# Patient Record
Sex: FEMALE | Race: WHITE | NOT HISPANIC OR LATINO | Employment: FULL TIME | URBAN - METROPOLITAN AREA
[De-identification: names, ages, dates, MRNs, and addresses within clinical notes are randomized per-mention and may not be internally consistent; named-entity substitution may affect disease eponyms.]

---

## 2017-07-10 ENCOUNTER — TELEPHONE (OUTPATIENT)
Dept: INTERNAL MEDICINE | Facility: CLINIC | Age: 37
End: 2017-07-10

## 2017-07-10 RX ORDER — LEVOTHYROXINE SODIUM 50 UG/1
50 TABLET ORAL DAILY
Refills: 0 | COMMUNITY
Start: 2017-07-08 | End: 2017-10-26 | Stop reason: SDUPTHER

## 2017-07-10 RX ORDER — BUPROPION HYDROCHLORIDE 300 MG/1
300 TABLET ORAL DAILY
Refills: 0 | COMMUNITY
Start: 2017-07-05 | End: 2017-10-26 | Stop reason: SDUPTHER

## 2017-07-10 RX ORDER — HYDROCHLOROTHIAZIDE 12.5 MG/1
12.5 CAPSULE ORAL DAILY
COMMUNITY
End: 2017-10-26 | Stop reason: ALTCHOICE

## 2017-07-10 RX ORDER — FOLIC ACID 0.4 MG
400 TABLET ORAL DAILY
COMMUNITY
End: 2017-07-11

## 2017-07-11 ENCOUNTER — OFFICE VISIT (OUTPATIENT)
Dept: INTERNAL MEDICINE | Facility: CLINIC | Age: 37
End: 2017-07-11
Payer: COMMERCIAL

## 2017-07-11 VITALS
RESPIRATION RATE: 16 BRPM | OXYGEN SATURATION: 97 % | HEIGHT: 63 IN | HEART RATE: 73 BPM | TEMPERATURE: 99 F | SYSTOLIC BLOOD PRESSURE: 122 MMHG | WEIGHT: 186.75 LBS | DIASTOLIC BLOOD PRESSURE: 82 MMHG | BODY MASS INDEX: 33.09 KG/M2

## 2017-07-11 DIAGNOSIS — H70.002 ACUTE MASTOIDITIS OF LEFT SIDE WITHOUT COMPLICATIONS: Primary | ICD-10-CM

## 2017-07-11 PROCEDURE — 99203 OFFICE O/P NEW LOW 30 MIN: CPT | Mod: S$GLB,,, | Performed by: FAMILY MEDICINE

## 2017-07-11 PROCEDURE — 3008F BODY MASS INDEX DOCD: CPT | Mod: S$GLB,,, | Performed by: FAMILY MEDICINE

## 2017-07-11 PROCEDURE — 99999 PR PBB SHADOW E&M-EST. PATIENT-LVL IV: CPT | Mod: PBBFAC,,, | Performed by: FAMILY MEDICINE

## 2017-07-11 RX ORDER — AMOXICILLIN 500 MG/1
500 TABLET, FILM COATED ORAL EVERY 12 HOURS
Qty: 20 TABLET | Refills: 0 | Status: SHIPPED | OUTPATIENT
Start: 2017-07-11 | End: 2017-07-21

## 2017-07-11 RX ORDER — AMOXICILLIN AND CLAVULANATE POTASSIUM 875; 125 MG/1; MG/1
1 TABLET, FILM COATED ORAL 2 TIMES DAILY
COMMUNITY
End: 2017-08-31

## 2017-07-18 ENCOUNTER — OFFICE VISIT (OUTPATIENT)
Dept: OTOLARYNGOLOGY | Facility: CLINIC | Age: 37
End: 2017-07-18
Payer: COMMERCIAL

## 2017-07-18 ENCOUNTER — TELEPHONE (OUTPATIENT)
Dept: INTERNAL MEDICINE | Facility: CLINIC | Age: 37
End: 2017-07-18

## 2017-07-18 VITALS
TEMPERATURE: 98 F | SYSTOLIC BLOOD PRESSURE: 116 MMHG | HEART RATE: 89 BPM | DIASTOLIC BLOOD PRESSURE: 82 MMHG | BODY MASS INDEX: 32.61 KG/M2 | WEIGHT: 184.06 LBS

## 2017-07-18 DIAGNOSIS — H81.02 MENIERE DISEASE, LEFT: ICD-10-CM

## 2017-07-18 DIAGNOSIS — H92.02 OTALGIA, LEFT: Primary | ICD-10-CM

## 2017-07-18 PROCEDURE — 99243 OFF/OP CNSLTJ NEW/EST LOW 30: CPT | Mod: S$GLB,,, | Performed by: PHYSICIAN ASSISTANT

## 2017-07-18 PROCEDURE — 99999 PR PBB SHADOW E&M-EST. PATIENT-LVL III: CPT | Mod: PBBFAC,,, | Performed by: PHYSICIAN ASSISTANT

## 2017-07-18 NOTE — PROGRESS NOTES
Subjective:       Patient ID: Paty Parham is a 37 y.o. female.    Chief Complaint: Mastoiditis  (per Dr. Jorge Alberto Hunter )    Patient is a very pleasant 37 year old female here to see me today for the first time in consultation at the request of Dr. Hunter for evaluation of left mastoiditis.  Patient reports tenderness behind her left ear starting 7/7/17.  She is unsure if there was swelling.  She was seen at  on 7/10 and told eardrum looked cloudy and she was started on Augmentin.  She saw PCP the following day and was told she had mastoiditis and Amoxil was added to take along with Augmentin.  She's been on both antibiotics since that time and says she no longer has pain behind her left ear but does have discomfort below her left ear and in front of her left ear.  She thought her face in front of her left ear looked a little swollen earlier this week but denies redness.   Denies fever but had chills when she first went to .  Denies ear pain or drainage.  She has pressure in her left ear and tinnitus occasionally but that's not uncommon for her as she has AS Meniere's and takes HCTZ daily.  She was diagnosed in 2012 by Dr. Duncan Cedillo.  She has history of nontraumatic right TM perforation last October.  She denies grinding or clenching her teeth.  She has had right parotid sialoadenitis in the past due to stone obstruction but says her left face was not that swollen recently and not nearly as tender.  Denies pain or drainage in her mouth.  Denies otologic surgery.  Family history of hearing loss in her father (noise).  Denies history of loud noise exposure.  Denies dizziness but feels run down and disoriented at times.      Review of Systems   Constitutional: Positive for chills and fatigue. Negative for fever.   HENT: Positive for dental problem (left jaw), facial swelling (she thought left preauricular area swollen earlier this week) and tinnitus. Negative for congestion, ear discharge, ear pain  (fullness in left ear, no pain, just pressure), hearing loss, nosebleeds, postnasal drip, rhinorrhea, sinus pressure, sneezing, sore throat, trouble swallowing and voice change.    Eyes: Negative for discharge.   Respiratory: Negative for cough, shortness of breath and wheezing.    Cardiovascular: Negative for chest pain and palpitations.   Gastrointestinal: Positive for nausea. Negative for diarrhea and vomiting.   Musculoskeletal: Positive for arthralgias and back pain (injections in June). Negative for neck pain.   Allergic/Immunologic: Positive for environmental allergies. Negative for food allergies.   Neurological: Positive for dizziness (disoriented; not spinning) and headaches. Negative for seizures, speech difficulty, weakness and light-headedness.   Hematological: Negative for adenopathy.   Psychiatric/Behavioral: Positive for sleep disturbance (with antibiotics; taking Benadryl at night).       Objective:      Physical Exam   Constitutional: She is oriented to person, place, and time. She appears well-developed and well-nourished. She is cooperative. No distress.   HENT:   Head: Normocephalic and atraumatic.   Right Ear: Tympanic membrane, external ear and ear canal normal. No drainage, swelling or tenderness. Tympanic membrane is not erythematous. No middle ear effusion.   Left Ear: Tympanic membrane, external ear and ear canal normal. No drainage, swelling or tenderness. Tympanic membrane is not erythematous.  No middle ear effusion.   Nose: Nose normal. No mucosal edema, rhinorrhea, nasal deformity or septal deviation. No epistaxis. Right sinus exhibits no maxillary sinus tenderness and no frontal sinus tenderness. Left sinus exhibits no maxillary sinus tenderness and no frontal sinus tenderness.   Mouth/Throat: Uvula is midline, oropharynx is clear and moist and mucous membranes are normal. Mucous membranes are not pale and not dry. No trismus in the jaw. Normal dentition. No uvula swelling. No  oropharyngeal exudate or posterior oropharyngeal erythema.   No erythema or edema behind left ear or over the mastoid.  Not tender with palpation.  No displacement of the external ear.  No obvious edema or erythema of the left face or overlying the parotid.   Eyes: Conjunctivae, EOM and lids are normal. Pupils are equal, round, and reactive to light. Right eye exhibits no chemosis. Left eye exhibits no chemosis. Right conjunctiva is not injected. Left conjunctiva is not injected. No scleral icterus. Right eye exhibits normal extraocular motion and no nystagmus. Left eye exhibits normal extraocular motion and no nystagmus.   Neck: Trachea normal and phonation normal. No tracheal tenderness present. No tracheal deviation present. No thyroid mass and no thyromegaly present.   Cardiovascular: Intact distal pulses.    Pulmonary/Chest: Effort normal. No stridor. No respiratory distress.   Abdominal: She exhibits no distension.   Lymphadenopathy:        Head (right side): No submental, no submandibular, no preauricular and no posterior auricular adenopathy present.        Head (left side): No submental, no submandibular, no preauricular and no posterior auricular adenopathy present.     She has no cervical adenopathy.   Neurological: She is alert and oriented to person, place, and time. No cranial nerve deficit.   Skin: Skin is warm and dry. No rash noted. No erythema.   Psychiatric: She has a normal mood and affect. Her behavior is normal.           Tuning fork:  AC > BC AU  Husain lateralizes to the RIGHT    Assessment:       1. Otalgia, left    2. Meniere disease, left        Plan:         Reassured patient that her exam today looks good.  I see no FEDE and no obvious signs of mastoiditis.  She does not have erythema or edema over the mastoid or behind her ear.  That area is not tender to palpation.  Recommend she complete the oral antibiotics as prescribed.  No need for imaging today as she's now feeling better.  She  asks if the left facial swelling she experienced earlier this week could be related to her parotid gland and a possible stone.  Her exam today is not consistent with sialoadenitis but she's now been treated with Augmentin for over one week so that could have helped.  She admits to having nausea and maybe a little dehydration this past week.  We discussed conservative measures to help treat sialoadenitis, including massage, warm compresses, aggressive hydration, and oral sialogogues (lemon drops or peppermints).  Instructed her to call if she has any worsening pain or swelling and we can image at that time.      Thanks for the referral Dr. Hunter.  Report returned via EPIC.

## 2017-07-18 NOTE — TELEPHONE ENCOUNTER
----- Message from Marianela Moura sent at 7/18/2017  9:51 AM CDT -----  Contact: pt   Call pt regarding getting fitted in today to see the doctor to get rechecked for mastoiditis.  [phone] (home)

## 2017-08-15 NOTE — ASSESSMENT & PLAN NOTE
QUALITY described as an aching discomfort. LOCATION is inferior and posterior aspect of left ear. ONSET reported as over the last few days area and SEVERITY described as MODERATE. EXACERBATING factors include direct pressure.

## 2017-08-15 NOTE — PROGRESS NOTES
"CHIEF COMPLAINT  Otalgia      HISTORY OF PRESENT ILLNESS     Problem List Items Addressed This Visit     Acute mastoiditis of left side without complications - Primary    Current Assessment & Plan     QUALITY described as an aching discomfort. LOCATION is inferior and posterior aspect of left ear. ONSET reported as over the last few days area and SEVERITY described as MODERATE. EXACERBATING factors include direct pressure.         Relevant Medications    amoxicillin-clavulanate 875-125mg (AUGMENTIN) 875-125 mg per tablet      Other Visit Diagnoses    None.         REVIEW OF SYSTEMS  CONSTITUTIONAL: No fever or chills reported.   ENT: No difficulty swallowing reported.   PULMONARY: No cough or trouble breathing reported.   CARDIOVASCULAR: No angina or orthopnea reported.     PHYSICAL EXAM  Vitals:    07/11/17 1508   BP: 122/82   BP Location: Right arm   Patient Position: Sitting   Pulse: 73   Resp: 16   Temp: 98.6 °F (37 °C)   TempSrc: Oral   SpO2: 97%   Weight: 84.7 kg (186 lb 11.7 oz)   Height: 5' 3" (1.6 m)     CONST: Vital signs (BP, P, T, RR, et al) noted. No apparent distress. Does not appear acutely ill or septic. Appears adequately hydrated.  EYES: Pupils equal and reactive. Extraocular movements intact. Sclerae anicteric. Lids and conjunctiva unremarkable.  ENT: External ENT grossly unremarkable to inspection. Ear canals clear. Tympanic membranes are unremarkable, intact and not inflamed or bulging. Hearing grossly intact. Moderate tenderness on palpation of left mastoid process. Nasal mucosa pink.  Oropharynx moist without lesion, inflammation or exudate. Posterior oropharynx is symmetric.  NECK: Trachea midline. No significant cervical lymphadenopathy.  PULM: Lungs clear. Breathing unlabored.  HEART: Auscultation reveals regular rate and rhythm without murmur, gallop or rub. No carotid bruit.  GI: Abdomen soft and nontender. Bowel sounds present.  DERM: Skin warm and moist with normal " turgor.  NEURO: Strength is reasonably symmetric without gross focal motor deficits or gross deficits of cranial nerves III-XII.  PSYCH: Alert and oriented x 3. Mood is grossly euthymic. Affect appropriate. Judgment and insight not grossly compromised.  MSK: Grossly normal stance and gait.     PAST MEDICAL HISTORY, FAMILY HISTORY, SOCIAL HISTORY, CURRENT MEDICATION LIST, and ALLERGY LIST reviewed by me (JOSSELIN Hunter MD) and are updated consistent with the patient's report.    ASSESSMENT and PLAN  Acute mastoiditis of left side without complications  -     amoxicillin (AMOXIL) 500 MG Tab; Take 1 tablet (500 mg total) by mouth every 12 (twelve) hours. (TAKE CONCURRENTLY WITH AUGMENTIN)  Dispense: 20 tablet; Refill: 0        Medication List with Changes/Refills   Current Medications    AMOXICILLIN-CLAVULANATE 875-125MG (AUGMENTIN) 875-125 MG PER TABLET    Take 1 tablet by mouth 2 (two) times daily.    BUPROPION (WELLBUTRIN XL) 300 MG 24 HR TABLET    Take 300 mg by mouth once daily.    HYDROCHLOROTHIAZIDE (MICROZIDE) 12.5 MG CAPSULE    Take 12.5 mg by mouth once daily.    SYNTHROID 50 MCG TABLET    Take 50 mcg by mouth once daily.    ZENCHENT FE 0.4MG-35MCG(21) AND 75 MG (7) CHEW    Take 1 tablet by mouth once daily.   Discontinued Medications    FOLIC ACID (FOLVITE) 400 MCG TABLET    Take 400 mcg by mouth once daily.       Return for re-evaluate problem(s) discussed today.    ABOUT THIS DOCUMENTATION:  1. The order of the conditions listed in the HPI is one of convenience and does not necessarily reflect the chronology of the appointment, nor the relative importance of a condition. It is possible that additional description or status details about condition(s) may be found elsewhere in the documentation for today's encounter.  2. Documentation entered by me for this encounter was done in part using speech-recognition technology. Although I have made an effort to ensure accuracy, malapropisms may exist and  should be interpreted in context.                        -JOSSELIN Hunter MD    Patient Instructions

## 2017-08-31 ENCOUNTER — OFFICE VISIT (OUTPATIENT)
Dept: INTERNAL MEDICINE | Facility: CLINIC | Age: 37
End: 2017-08-31
Payer: COMMERCIAL

## 2017-08-31 ENCOUNTER — LAB VISIT (OUTPATIENT)
Dept: LAB | Facility: HOSPITAL | Age: 37
End: 2017-08-31
Attending: FAMILY MEDICINE
Payer: COMMERCIAL

## 2017-08-31 VITALS
TEMPERATURE: 96 F | OXYGEN SATURATION: 98 % | DIASTOLIC BLOOD PRESSURE: 72 MMHG | SYSTOLIC BLOOD PRESSURE: 122 MMHG | BODY MASS INDEX: 33.4 KG/M2 | WEIGHT: 188.5 LBS | HEIGHT: 63 IN | HEART RATE: 85 BPM

## 2017-08-31 DIAGNOSIS — F41.9 ANXIETY: ICD-10-CM

## 2017-08-31 DIAGNOSIS — E03.8 HYPOTHYROIDISM DUE TO HASHIMOTO'S THYROIDITIS: Primary | ICD-10-CM

## 2017-08-31 DIAGNOSIS — E06.3 HYPOTHYROIDISM DUE TO HASHIMOTO'S THYROIDITIS: Primary | ICD-10-CM

## 2017-08-31 DIAGNOSIS — E06.3 HYPOTHYROIDISM DUE TO HASHIMOTO'S THYROIDITIS: ICD-10-CM

## 2017-08-31 DIAGNOSIS — F33.41 RECURRENT MAJOR DEPRESSION IN PARTIAL REMISSION: Chronic | ICD-10-CM

## 2017-08-31 DIAGNOSIS — E03.8 HYPOTHYROIDISM DUE TO HASHIMOTO'S THYROIDITIS: ICD-10-CM

## 2017-08-31 DIAGNOSIS — I10 BENIGN ESSENTIAL HYPERTENSION: ICD-10-CM

## 2017-08-31 PROBLEM — H70.002: Status: RESOLVED | Noted: 2017-07-11 | Resolved: 2017-08-31

## 2017-08-31 LAB — TSH SERPL DL<=0.005 MIU/L-ACNC: 1.71 UIU/ML

## 2017-08-31 PROCEDURE — 99214 OFFICE O/P EST MOD 30 MIN: CPT | Mod: S$GLB,,, | Performed by: FAMILY MEDICINE

## 2017-08-31 PROCEDURE — 3008F BODY MASS INDEX DOCD: CPT | Mod: S$GLB,,, | Performed by: FAMILY MEDICINE

## 2017-08-31 PROCEDURE — 99999 PR PBB SHADOW E&M-EST. PATIENT-LVL IV: CPT | Mod: PBBFAC,,, | Performed by: FAMILY MEDICINE

## 2017-08-31 PROCEDURE — 84443 ASSAY THYROID STIM HORMONE: CPT

## 2017-08-31 PROCEDURE — 36415 COLL VENOUS BLD VENIPUNCTURE: CPT | Mod: PO

## 2017-08-31 RX ORDER — DIAZEPAM 2 MG/1
2 TABLET ORAL EVERY 12 HOURS PRN
Qty: 45 TABLET | Refills: 1 | Status: SHIPPED | OUTPATIENT
Start: 2017-08-31 | End: 2017-10-26 | Stop reason: SDUPTHER

## 2017-08-31 NOTE — PROGRESS NOTES
"CHIEF COMPLAINT  Anxiety      HISTORY OF PRESENT ILLNESS     Problem List Items Addressed This Visit     Benign essential hypertension    Current Assessment & Plan     Based on the report of the patient and information available to me at present, this condition appears to be WELL CONTROLLED, and this condition appears to be STABLE.          Hypothyroidism due to Hashimoto's thyroiditis - Primary (Chronic)    Current Assessment & Plan     She is overdue for TSH monitoring. We discussed how supratherapeutic dosing of levothyroxine cause or exacerbate her anxiety symptoms.         Relevant Orders    TSH (Completed)    Recurrent major depression in partial remission (Chronic)    Current Assessment & Plan     Her depression appears reasonably well controlled, with only minimal to mild breakthrough depression symptoms. She is tolerating bupropion well without apparent side effect.         Anxiety (Chronic)    Current Assessment & Plan     She reports increasing anxiety, pervasive worrying and psychomotor agitation, and disturbed sleep. We discussed risks and benefits of treatment options, and it was agreed that she would begin CBT and use diazepam as needed for rate your anxiety symptoms. She understands that this medication can be sedating and potentially habit forming. She has no history of chemical dependency.         Relevant Medications    diazePAM (VALIUM) 2 MG tablet    Other Relevant Orders    Ambulatory referral to Psychology      Other Visit Diagnoses    None.         REVIEW OF SYSTEMS  PSYCHIATRIC: No suicidal ideations, shante or hypomania reported.  NEUROLOGIC: No seizures or disordered movement reported.  ENDOCRINE: No polyuria or polydipsia reported.     PHYSICAL EXAM  Vitals:    08/31/17 0822   BP: 122/72   BP Location: Right arm   Patient Position: Sitting   BP Method: Medium (Manual)   Pulse: 85   Temp: 96.1 °F (35.6 °C)   TempSrc: Tympanic   SpO2: 98%   Weight: 85.5 kg (188 lb 7.9 oz)   Height: 5' 3" " (1.6 m)     CONSTITUTIONAL: Vital signs (BP, P, T, RR, et al) noted. No apparent distress. Does not appear acutely ill or septic. Appears adequately hydrated.  HEENT: External ENT grossly unremarkable. Hearing grossly intact. Oropharynx moist.  NECK: Trachea midline. Thyroid nontender.  PULM: Lungs clear. Breathing unlabored.  HEART: Auscultation reveals regular rate and rhythm without murmur, gallop or rub.  DERM: Skin warm and moist with normal turgor.  NEURO: There are no gross focal motor deficits or gross deficits of cranial nerves III-XII.  PSYCHIATRIC: Alert and oriented x 3. Mood is grossly neutral. Affect appropriate. Judgment and insight not grossly compromised.  MUSCULOSKELETAL: Grossly normal stance and gait.     PAST MEDICAL HISTORY, FAMILY HISTORY, SOCIAL HISTORY, CURRENT MEDICATION LIST, and ALLERGY LIST reviewed by me (JOSSELIN Hunter MD) and are updated consistent with the patient's report.    ASSESSMENT and PLAN  Hypothyroidism due to Hashimoto's thyroiditis  -     TSH; Future; Expected date: 08/31/2017    Recurrent major depression in partial remission    Anxiety  -     Ambulatory referral to Psychology  -     diazePAM (VALIUM) 2 MG tablet; Take 1 tablet (2 mg total) by mouth every 12 (twelve) hours as needed for Anxiety.  Dispense: 45 tablet; Refill: 1    Benign essential hypertension        Medication List with Changes/Refills   New Medications    DIAZEPAM (VALIUM) 2 MG TABLET    Take 1 tablet (2 mg total) by mouth every 12 (twelve) hours as needed for Anxiety.   Current Medications    BUPROPION (WELLBUTRIN XL) 300 MG 24 HR TABLET    Take 300 mg by mouth once daily.    HYDROCHLOROTHIAZIDE (MICROZIDE) 12.5 MG CAPSULE    Take 12.5 mg by mouth once daily.    SYNTHROID 50 MCG TABLET    Take 50 mcg by mouth once daily.    ZENCHENT FE 0.4MG-35MCG(21) AND 75 MG (7) CHEW    Take 1 tablet by mouth once daily.   Discontinued Medications    AMOXICILLIN-CLAVULANATE 875-125MG (AUGMENTIN) 875-125  MG PER TABLET    Take 1 tablet by mouth 2 (two) times daily.       Return in about 6 weeks (around 10/12/2017) for re-evaluate problem(s) discussed today.    ABOUT THIS DOCUMENTATION:  · The order of the conditions listed in the HPI is one of convenience and does not necessarily reflect the chronology of the appointment, nor the relative importance of a condition. It is possible that additional description or status details about condition(s) may be found elsewhere in the documentation for today's encounter.  · Documentation entered by me for this encounter was done in part using speech-recognition technology. Although I have made an effort to ensure accuracy, malapropisms may exist and should be interpreted in context.                        -JOSSELIN Hunter MD    Patient Instructions   CALL TO SCHEDULE APPOINTMENT:  Lisa Gallego LCSW  [address] [facility] [place] [address]  PHONE & FAX: [phone]

## 2017-08-31 NOTE — PATIENT INSTRUCTIONS
CALL TO SCHEDULE APPOINTMENT:  Lisa Gallego LCSW  [address] [facility]  [place] [address]  PHONE & FAX: [phone]

## 2017-09-01 PROBLEM — I10 BENIGN ESSENTIAL HYPERTENSION: Status: ACTIVE | Noted: 2017-09-01

## 2017-09-01 NOTE — ASSESSMENT & PLAN NOTE
She reports increasing anxiety, pervasive worrying and psychomotor agitation, and disturbed sleep. We discussed risks and benefits of treatment options, and it was agreed that she would begin CBT and use diazepam as needed for rate your anxiety symptoms. She understands that this medication can be sedating and potentially habit forming. She has no history of chemical dependency.

## 2017-09-01 NOTE — ASSESSMENT & PLAN NOTE
Her depression appears reasonably well controlled, with only minimal to mild breakthrough depression symptoms. She is tolerating bupropion well without apparent side effect.

## 2017-09-01 NOTE — ASSESSMENT & PLAN NOTE
She is overdue for TSH monitoring. We discussed how supratherapeutic dosing of levothyroxine cause or exacerbate her anxiety symptoms.

## 2017-09-04 NOTE — PROGRESS NOTES
SEE PATIENT PORTAL COMMENT  (This is FYI only. No further action required, unless you need to notify the patient or otherwise deem it necessary.)  --------------------------------------------------------------------------------   Paty's future appointment include:  No future appointments.

## 2017-10-26 ENCOUNTER — OFFICE VISIT (OUTPATIENT)
Dept: INTERNAL MEDICINE | Facility: CLINIC | Age: 37
End: 2017-10-26
Payer: COMMERCIAL

## 2017-10-26 ENCOUNTER — CLINICAL SUPPORT (OUTPATIENT)
Dept: CARDIOLOGY | Facility: CLINIC | Age: 37
End: 2017-10-26
Payer: COMMERCIAL

## 2017-10-26 VITALS
OXYGEN SATURATION: 98 % | DIASTOLIC BLOOD PRESSURE: 82 MMHG | WEIGHT: 191.13 LBS | HEART RATE: 78 BPM | HEIGHT: 63 IN | BODY MASS INDEX: 33.87 KG/M2 | SYSTOLIC BLOOD PRESSURE: 116 MMHG | TEMPERATURE: 98 F

## 2017-10-26 DIAGNOSIS — Q82.0 HEREDITARY EDEMA OF LEGS: Chronic | ICD-10-CM

## 2017-10-26 DIAGNOSIS — F33.41 RECURRENT MAJOR DEPRESSION IN PARTIAL REMISSION: Chronic | ICD-10-CM

## 2017-10-26 DIAGNOSIS — Z79.899 ENCOUNTER FOR LONG-TERM CURRENT USE OF MEDICATION: Chronic | ICD-10-CM

## 2017-10-26 DIAGNOSIS — I10 BENIGN ESSENTIAL HYPERTENSION: ICD-10-CM

## 2017-10-26 DIAGNOSIS — E06.3 HYPOTHYROIDISM DUE TO HASHIMOTO'S THYROIDITIS: Chronic | ICD-10-CM

## 2017-10-26 DIAGNOSIS — F41.1 GENERALIZED ANXIETY DISORDER: Chronic | ICD-10-CM

## 2017-10-26 DIAGNOSIS — E03.8 HYPOTHYROIDISM DUE TO HASHIMOTO'S THYROIDITIS: Chronic | ICD-10-CM

## 2017-10-26 DIAGNOSIS — F41.9 ANXIETY: ICD-10-CM

## 2017-10-26 DIAGNOSIS — I10 BENIGN ESSENTIAL HYPERTENSION: Primary | ICD-10-CM

## 2017-10-26 DIAGNOSIS — R07.89 CHEST PAIN, ATYPICAL: Chronic | ICD-10-CM

## 2017-10-26 PROCEDURE — 99214 OFFICE O/P EST MOD 30 MIN: CPT | Mod: S$GLB,,, | Performed by: FAMILY MEDICINE

## 2017-10-26 PROCEDURE — 99999 PR PBB SHADOW E&M-EST. PATIENT-LVL III: CPT | Mod: PBBFAC,,, | Performed by: FAMILY MEDICINE

## 2017-10-26 PROCEDURE — 93000 ELECTROCARDIOGRAM COMPLETE: CPT | Mod: S$GLB,,, | Performed by: INTERNAL MEDICINE

## 2017-10-26 RX ORDER — BUPROPION HYDROCHLORIDE 300 MG/1
300 TABLET ORAL DAILY
Qty: 90 TABLET | Refills: 3 | Status: SHIPPED | OUTPATIENT
Start: 2017-10-26 | End: 2018-11-29 | Stop reason: SDUPTHER

## 2017-10-26 RX ORDER — LEVOTHYROXINE SODIUM 50 UG/1
50 TABLET ORAL DAILY
Qty: 90 TABLET | Refills: 3 | Status: SHIPPED | OUTPATIENT
Start: 2017-10-26 | End: 2018-11-29 | Stop reason: SDUPTHER

## 2017-10-26 RX ORDER — NITROGLYCERIN 0.4 MG/1
TABLET SUBLINGUAL
Qty: 25 TABLET | Refills: 2 | Status: SHIPPED | OUTPATIENT
Start: 2017-10-26 | End: 2018-05-23

## 2017-10-26 RX ORDER — DIAZEPAM 2 MG/1
2 TABLET ORAL EVERY 12 HOURS PRN
Qty: 60 TABLET | Refills: 4 | Status: SHIPPED | OUTPATIENT
Start: 2017-10-26 | End: 2018-05-23 | Stop reason: SDUPTHER

## 2017-10-26 RX ORDER — AMLODIPINE BESYLATE 5 MG/1
5 TABLET ORAL DAILY
Qty: 30 TABLET | Refills: 1 | Status: SHIPPED | OUTPATIENT
Start: 2017-10-26 | End: 2017-11-20 | Stop reason: SINTOL

## 2017-10-29 NOTE — PROGRESS NOTES
HEALTH MAINTENANCE REVIEW  Health Maintenance   Topic Date Due    Pap Smear with HPV Cotest  07/03/2001    Influenza Vaccine  08/01/2017    TETANUS VACCINE  10/12/2025    Lipid Panel  Completed        HEALTH MAINTENANCE INTERVENTIONS - DUE OR DUE SOON  Health Maintenance Due   Topic Date Due    Pap Smear with HPV Cotest  07/03/2001    Influenza Vaccine  08/01/2017       FUTURE APPOINTMENTS  No future appointments.    CHIEF COMPLAINT  Anxiety (meds helping, also counceling)      HISTORY OF PRESENT ILLNESS  PROBLEM/CONDITION: Hypertension appears well-controlled. No exertional chest pain or shortness of breath reported.    PROBLEM/CONDITION: Hypothyroidism appears well-controlled.    PROBLEM/CONDITION: Depression appears well-controlled. No shante, hypomania, or suicidal ideations reported.    PROBLEM/CONDITION: Anxiety appears equivocally controlled. No paranoia or disordered thinking reported. She has been seeing licensed clinical  on a biweekly basis for cognitive behavioral therapy, and she says that this has been very therapeutic. She says that she is needing/using the diazepam an average of 3 mg (1 1/2 tablet of the 2 mg tablet) daily, and it is providing significant symptomatic relief without causing excess sedation. She is demonstrating no behaviors to suggest inappropriate use of prescribed medications. [place] Board of Pharmacy Controlled Prescription Drug Monitoring database was queried and showed no activity to suggest abuse, diversion, or other inappropriate use of prescription medications.    PROBLEM/CONDITION: Symptoms previously described in a manner that suggested panic attacks were further explored and it now reveals that she has non-exertional paroxysmal transient episodes of squeezing substernal chest pain that occur exclulsively in the early morning hours and there is no correlation with physical exertion. She says that she swims 150 laps each week, and has no recurrence  of the same or similar symptoms then. We discussed differential diagnosis. Given her age and very low cardiovascular risk profile, I suspect that this is likely vasospastic angina. It was agreed to initiate an empiric trial of therapy with low dose amlodipine, with sublingual nitroglycerin to take as needed. We will discontinue her hydrochlorothiazide. She will monitor her blood pressure. She has MILD underlying hereditary venous stasis hypertension, and she understands that the amlodipine may have an adverse effect on that condition. Although my suspicion for coronary atherosclerosis is extremely low, it was agreed to evaluate her further with exercise cardiac stress test.    No other complaints or concerns reported.      Problem List Items Addressed This Visit     Benign essential hypertension - Primary    Relevant Medications    amLODIPine (NORVASC) 5 MG tablet    Other Relevant Orders    SCHEDULED EKG 12-LEAD (to Muse) (Completed)    Cardiac treadmill stress test    Hypothyroidism due to Hashimoto's thyroiditis (Chronic)    Relevant Medications    SYNTHROID 50 mcg tablet    Recurrent major depression in partial remission (Chronic)    Relevant Medications    buPROPion (WELLBUTRIN XL) 300 MG 24 hr tablet    Generalized anxiety disorder (Chronic)    Relevant Medications    diazePAM (VALIUM) 2 MG tablet    buPROPion (WELLBUTRIN XL) 300 MG 24 hr tablet    Encounter for long-term current use of medication (Chronic)    Hereditary edema of legs (Chronic)    Chest pain, atypical (Chronic)    Overview     Working diagnosis: vasospastic angina         Relevant Medications    amLODIPine (NORVASC) 5 MG tablet    nitroGLYCERIN (NITROSTAT) 0.4 MG SL tablet    Other Relevant Orders    SCHEDULED EKG 12-LEAD (to Muse) (Completed)    Cardiac treadmill stress test      Other Visit Diagnoses     Anxiety        Relevant Medications    diazePAM (VALIUM) 2 MG tablet    buPROPion (WELLBUTRIN XL) 300 MG 24 hr tablet          REVIEW OF  "SYSTEMS  PSYCHIATRIC: No suicidal ideations, shante or hypomania reported.  NEUROLOGIC: No seizures or disordered movement reported.  ENDOCRINE: No polyuria or polydipsia reported.     PHYSICAL EXAM  Vitals:    10/26/17 1132   BP: 116/82   BP Location: Right arm   Patient Position: Sitting   BP Method: Medium (Manual)   Pulse: 78   Temp: 97.5 °F (36.4 °C)   TempSrc: Tympanic   SpO2: 98%   Weight: 86.7 kg (191 lb 2.2 oz)   Height: 5' 3" (1.6 m)     CONSTITUTIONAL: Vital signs noted. No apparent distress. Does not appear acutely ill or septic. Appears adequately hydrated.  HEENT: External ENT grossly unremarkable. Hearing grossly intact. Oropharynx moist.  PULM: Lungs clear. Breathing unlabored.  HEART: Auscultation reveals regular rate and rhythm without murmur, gallop or rub.  DERM: Skin warm and moist with normal turgor.  NEURO: There are no gross focal motor deficits or gross deficits of cranial nerves III-XII.  PSYCHIATRIC: Alert and oriented x 3. Mood is grossly neutral. Affect appropriate. Judgment and insight not grossly compromised.  MUSCULOSKELETAL: Grossly normal stance and gait.     PAST MEDICAL HISTORY, FAMILY HISTORY, SOCIAL HISTORY, CURRENT MEDICATION LIST, and ALLERGY LIST reviewed by me (JOSSELIN Hunter MD) and are updated consistent with the patient's report.    ASSESSMENT and PLAN  Benign essential hypertension  -     SCHEDULED EKG 12-LEAD (to Muse); Future  -     Cardiac treadmill stress test; Future  -     amLODIPine (NORVASC) 5 MG tablet; Take 1 tablet (5 mg total) by mouth once daily.  Dispense: 30 tablet; Refill: 1    Recurrent major depression in partial remission  -     buPROPion (WELLBUTRIN XL) 300 MG 24 hr tablet; Take 1 tablet (300 mg total) by mouth once daily.  Dispense: 90 tablet; Refill: 3    Generalized anxiety disorder    Hypothyroidism due to Hashimoto's thyroiditis  -     SYNTHROID 50 mcg tablet; Take 1 tablet (50 mcg total) by mouth once daily.  Dispense: 90 tablet; " Refill: 3    Encounter for long-term current use of medication    Hereditary edema of legs    Chest pain, atypical  -     SCHEDULED EKG 12-LEAD (to Muse); Future  -     Cardiac treadmill stress test; Future  -     amLODIPine (NORVASC) 5 MG tablet; Take 1 tablet (5 mg total) by mouth once daily.  Dispense: 30 tablet; Refill: 1  -     nitroGLYCERIN (NITROSTAT) 0.4 MG SL tablet; Take one by mouth as needed at onset of chest pain.  Dispense: 25 tablet; Refill: 2    Anxiety  -     diazePAM (VALIUM) 2 MG tablet; Take 1 tablet (2 mg total) by mouth every 12 (twelve) hours as needed for Anxiety.  Dispense: 60 tablet; Refill: 4  -     buPROPion (WELLBUTRIN XL) 300 MG 24 hr tablet; Take 1 tablet (300 mg total) by mouth once daily.  Dispense: 90 tablet; Refill: 3        Medication List with Changes/Refills   New Medications    AMLODIPINE (NORVASC) 5 MG TABLET    Take 1 tablet (5 mg total) by mouth once daily.    NITROGLYCERIN (NITROSTAT) 0.4 MG SL TABLET    Take one by mouth as needed at onset of chest pain.   Current Medications    ZENCHENT FE 0.4MG-35MCG(21) AND 75 MG (7) CHEW    Take 1 tablet by mouth once daily.   Changed and/or Refilled Medications    Modified Medication Previous Medication    BUPROPION (WELLBUTRIN XL) 300 MG 24 HR TABLET buPROPion (WELLBUTRIN XL) 300 MG 24 hr tablet       Take 1 tablet (300 mg total) by mouth once daily.    Take 300 mg by mouth once daily.    DIAZEPAM (VALIUM) 2 MG TABLET diazePAM (VALIUM) 2 MG tablet       Take 1 tablet (2 mg total) by mouth every 12 (twelve) hours as needed for Anxiety.    Take 1 tablet (2 mg total) by mouth every 12 (twelve) hours as needed for Anxiety.    SYNTHROID 50 MCG TABLET SYNTHROID 50 mcg tablet       Take 1 tablet (50 mcg total) by mouth once daily.    Take 50 mcg by mouth once daily.   Discontinued Medications    HYDROCHLOROTHIAZIDE (MICROZIDE) 12.5 MG CAPSULE    Take 12.5 mg by mouth once daily.       Return in about 2 weeks (around 11/9/2017) for review  "test results and discuss treatment plan.    ABOUT THIS DOCUMENTATION:  · The order of the conditions listed in the HPI is one of convenience and does not necessarily reflect the chronology of the appointment, nor the relative importance of a condition. It is possible that additional description or status details about condition(s) may be found elsewhere in the documentation for today's encounter.  · Documentation entered by me for this encounter was done in part using speech-recognition technology. Although I have made an effort to ensure accuracy, "sound like" errors may exist and should be interpreted in context.                        -JOSSELIN Hunter MD    There are no Patient Instructions on file for this visit.    "

## 2017-11-07 ENCOUNTER — PATIENT MESSAGE (OUTPATIENT)
Dept: INTERNAL MEDICINE | Facility: CLINIC | Age: 37
End: 2017-11-07

## 2017-11-20 ENCOUNTER — OFFICE VISIT (OUTPATIENT)
Dept: INTERNAL MEDICINE | Facility: CLINIC | Age: 37
End: 2017-11-20
Payer: COMMERCIAL

## 2017-11-20 ENCOUNTER — LAB VISIT (OUTPATIENT)
Dept: LAB | Facility: HOSPITAL | Age: 37
End: 2017-11-20
Attending: FAMILY MEDICINE
Payer: COMMERCIAL

## 2017-11-20 VITALS
WEIGHT: 187.19 LBS | HEART RATE: 79 BPM | BODY MASS INDEX: 33.17 KG/M2 | TEMPERATURE: 97 F | DIASTOLIC BLOOD PRESSURE: 86 MMHG | OXYGEN SATURATION: 99 % | SYSTOLIC BLOOD PRESSURE: 118 MMHG | HEIGHT: 63 IN

## 2017-11-20 DIAGNOSIS — R10.9 RIGHT FLANK PAIN: ICD-10-CM

## 2017-11-20 DIAGNOSIS — Q82.0 HEREDITARY EDEMA OF LEGS: Chronic | ICD-10-CM

## 2017-11-20 DIAGNOSIS — R07.89 CHEST PAIN, ATYPICAL: Chronic | ICD-10-CM

## 2017-11-20 DIAGNOSIS — R10.9 RIGHT FLANK PAIN: Primary | ICD-10-CM

## 2017-11-20 LAB
BILIRUB UR QL STRIP: NEGATIVE
CLARITY UR: CLEAR
COLOR UR: YELLOW
GLUCOSE UR QL STRIP: NEGATIVE
HGB UR QL STRIP: NEGATIVE
KETONES UR QL STRIP: NEGATIVE
LEUKOCYTE ESTERASE UR QL STRIP: NEGATIVE
NITRITE UR QL STRIP: NEGATIVE
PH UR STRIP: 6 [PH] (ref 5–8)
PROT UR QL STRIP: NEGATIVE
SP GR UR STRIP: >=1.03 (ref 1–1.03)
URN SPEC COLLECT METH UR: ABNORMAL

## 2017-11-20 PROCEDURE — 99999 PR PBB SHADOW E&M-EST. PATIENT-LVL III: CPT | Mod: PBBFAC,,, | Performed by: FAMILY MEDICINE

## 2017-11-20 PROCEDURE — 85025 COMPLETE CBC W/AUTO DIFF WBC: CPT

## 2017-11-20 PROCEDURE — 99214 OFFICE O/P EST MOD 30 MIN: CPT | Mod: S$GLB,,, | Performed by: FAMILY MEDICINE

## 2017-11-20 PROCEDURE — 36415 COLL VENOUS BLD VENIPUNCTURE: CPT | Mod: PO

## 2017-11-20 PROCEDURE — 81003 URINALYSIS AUTO W/O SCOPE: CPT | Mod: PO

## 2017-11-20 PROCEDURE — 80053 COMPREHEN METABOLIC PANEL: CPT

## 2017-11-21 ENCOUNTER — HOSPITAL ENCOUNTER (OUTPATIENT)
Dept: RADIOLOGY | Facility: HOSPITAL | Age: 37
Discharge: HOME | End: 2017-11-21
Attending: FAMILY MEDICINE
Payer: COMMERCIAL

## 2017-11-21 DIAGNOSIS — R10.9 RIGHT FLANK PAIN: ICD-10-CM

## 2017-11-21 LAB
ALBUMIN SERPL BCP-MCNC: 3.2 G/DL
ALP SERPL-CCNC: 65 U/L
ALT SERPL W/O P-5'-P-CCNC: 8 U/L
ANION GAP SERPL CALC-SCNC: 9 MMOL/L
AST SERPL-CCNC: 16 U/L
BASOPHILS # BLD AUTO: 0.06 K/UL
BASOPHILS NFR BLD: 0.6 %
BILIRUB SERPL-MCNC: 0.2 MG/DL
BUN SERPL-MCNC: 12 MG/DL
CALCIUM SERPL-MCNC: 9.6 MG/DL
CHLORIDE SERPL-SCNC: 106 MMOL/L
CO2 SERPL-SCNC: 25 MMOL/L
CREAT SERPL-MCNC: 1 MG/DL
DIFFERENTIAL METHOD: ABNORMAL
EOSINOPHIL # BLD AUTO: 0.3 K/UL
EOSINOPHIL NFR BLD: 2.8 %
ERYTHROCYTE [DISTWIDTH] IN BLOOD BY AUTOMATED COUNT: 12.6 %
EST. GFR  (AFRICAN AMERICAN): >60 ML/MIN/1.73 M^2
EST. GFR  (NON AFRICAN AMERICAN): >60 ML/MIN/1.73 M^2
GLUCOSE SERPL-MCNC: 69 MG/DL
HCT VFR BLD AUTO: 42.7 %
HGB BLD-MCNC: 13.7 G/DL
IMM GRANULOCYTES # BLD AUTO: 0.02 K/UL
IMM GRANULOCYTES NFR BLD AUTO: 0.2 %
LYMPHOCYTES # BLD AUTO: 2.4 K/UL
LYMPHOCYTES NFR BLD: 24.8 %
MCH RBC QN AUTO: 30.3 PG
MCHC RBC AUTO-ENTMCNC: 32.1 G/DL
MCV RBC AUTO: 95 FL
MONOCYTES # BLD AUTO: 0.6 K/UL
MONOCYTES NFR BLD: 6.4 %
NEUTROPHILS # BLD AUTO: 6.4 K/UL
NEUTROPHILS NFR BLD: 65.2 %
NRBC BLD-RTO: 0 /100 WBC
PLATELET # BLD AUTO: 424 K/UL
PMV BLD AUTO: 10.4 FL
POTASSIUM SERPL-SCNC: 4.5 MMOL/L
PROT SERPL-MCNC: 8.1 G/DL
RBC # BLD AUTO: 4.52 M/UL
SODIUM SERPL-SCNC: 140 MMOL/L
WBC # BLD AUTO: 9.75 K/UL

## 2017-11-21 PROCEDURE — 76700 US EXAM ABDOM COMPLETE: CPT | Mod: 26,,, | Performed by: RADIOLOGY

## 2017-11-21 PROCEDURE — 76700 US EXAM ABDOM COMPLETE: CPT | Mod: TC,PO

## 2017-11-27 ENCOUNTER — OFFICE VISIT (OUTPATIENT)
Dept: INTERNAL MEDICINE | Facility: CLINIC | Age: 37
End: 2017-11-27
Payer: COMMERCIAL

## 2017-11-27 ENCOUNTER — TELEPHONE (OUTPATIENT)
Dept: INTERNAL MEDICINE | Facility: CLINIC | Age: 37
End: 2017-11-27

## 2017-11-27 VITALS
HEIGHT: 63 IN | TEMPERATURE: 98 F | BODY MASS INDEX: 33.4 KG/M2 | HEART RATE: 72 BPM | OXYGEN SATURATION: 98 % | WEIGHT: 188.5 LBS | DIASTOLIC BLOOD PRESSURE: 86 MMHG | SYSTOLIC BLOOD PRESSURE: 118 MMHG

## 2017-11-27 DIAGNOSIS — K76.89 LIVER CYST: ICD-10-CM

## 2017-11-27 DIAGNOSIS — R10.11 RIGHT UPPER QUADRANT PAIN: ICD-10-CM

## 2017-11-27 DIAGNOSIS — R10.9 RIGHT FLANK PAIN: Primary | ICD-10-CM

## 2017-11-27 PROBLEM — R07.89 CHEST PAIN, ATYPICAL: Chronic | Status: RESOLVED | Noted: 2017-10-26 | Resolved: 2017-11-27

## 2017-11-27 PROCEDURE — 99999 PR PBB SHADOW E&M-EST. PATIENT-LVL III: CPT | Mod: PBBFAC,,, | Performed by: FAMILY MEDICINE

## 2017-11-27 PROCEDURE — 99214 OFFICE O/P EST MOD 30 MIN: CPT | Mod: S$GLB,,, | Performed by: FAMILY MEDICINE

## 2017-11-27 RX ORDER — DICYCLOMINE HYDROCHLORIDE 20 MG/1
20 TABLET ORAL
Qty: 60 TABLET | Refills: 0 | Status: ON HOLD | OUTPATIENT
Start: 2017-11-27 | End: 2017-12-21 | Stop reason: HOSPADM

## 2017-11-27 NOTE — TELEPHONE ENCOUNTER
----- Message from Silvina Hand sent at 11/27/2017  9:51 AM CST -----  Please call pt back at [phone] concerning her test results.

## 2017-11-28 NOTE — PROGRESS NOTES
Subjective:   Patient ID: Paty Parham is a 37 y.o. female.  Chief Complaint:  Flank Pain      PCP Dr. Hunter.  Presents for on recent abdominal ultrasound.    Ultrasound showed   Liver is normal in size and homogeneous in echotexture with small 9 mm simple cyst noted in the right hepatic lobe.  Gallbladder is unremarkable without intraluminal stones or sludge seen.  No biliary tract dilatation.  Spleen, pancreas, and kidneys are unremarkable.  Abdominal aorta tapers normally.  IVC is unremarkable.  No ascites.   Done for right flank/upper quadrant/upper back pain.  Nonspecific etiology.   CBC and CMP also unremarkable.  Patient still complains of similar pain today.  No worsening.  No better.        Current Outpatient Prescriptions:     buPROPion (WELLBUTRIN XL) 300 MG 24 hr tablet, Take 1 tablet (300 mg total) by mouth once daily., Disp: 90 tablet, Rfl: 3    diazePAM (VALIUM) 2 MG tablet, Take 1 tablet (2 mg total) by mouth every 12 (twelve) hours as needed for Anxiety., Disp: 60 tablet, Rfl: 4    dicyclomine (BENTYL) 20 mg tablet, Take 1 tablet (20 mg total) by mouth 4 (four) times daily before meals and nightly., Disp: 60 tablet, Rfl: 0    nitroGLYCERIN (NITROSTAT) 0.4 MG SL tablet, Take one by mouth as needed at onset of chest pain., Disp: 25 tablet, Rfl: 2    SYNTHROID 50 mcg tablet, Take 1 tablet (50 mcg total) by mouth once daily., Disp: 90 tablet, Rfl: 3    ZENCHENT FE 0.4mg-35mcg(21) and 75 mg (7) Chew, Take 1 tablet by mouth once daily., Disp: , Rfl: 1     Review of Systems   Constitutional: Negative for chills, fatigue and fever.   Respiratory: Negative for shortness of breath.    Cardiovascular: Negative for chest pain and leg swelling.   Gastrointestinal: Positive for abdominal pain. Negative for constipation, diarrhea, nausea and vomiting.   Genitourinary: Positive for flank pain. Negative for decreased urine volume, difficulty urinating, dysuria, enuresis, frequency, hematuria and urgency.  "  Musculoskeletal: Negative for myalgias.   Skin: Negative for rash.   Hematological: Negative for adenopathy.   Psychiatric/Behavioral: The patient is nervous/anxious.      Objective:   /86 (BP Location: Right arm, Patient Position: Sitting)   Pulse 72   Temp 97.8 °F (36.6 °C) (Tympanic)   Ht 5' 3" (1.6 m)   Wt 85.5 kg (188 lb 7.9 oz)   LMP 10/31/2017 (Exact Date)   SpO2 98%   BMI 33.39 kg/m²     Physical Exam   Constitutional: She is oriented to person, place, and time. Vital signs are normal. She appears well-developed and well-nourished.   Neck: No JVD present. No thyroid mass and no thyromegaly present.   Cardiovascular: Normal rate, regular rhythm and normal heart sounds.  Exam reveals no gallop and no friction rub.    No murmur heard.  Pulses:       Radial pulses are 2+ on the right side, and 2+ on the left side.   Pulmonary/Chest: Effort normal and breath sounds normal. She has no wheezes. She has no rhonchi. She has no rales.   Abdominal: Soft. She exhibits no distension. There is no tenderness. There is no rebound, no guarding and no CVA tenderness.   Musculoskeletal: Normal range of motion. She exhibits no edema.   Neurological: She is oriented to person, place, and time. She displays a negative Romberg sign. Coordination and gait normal.   Skin: Skin is warm and dry. No rash noted.   Psychiatric: Her behavior is normal. Judgment and thought content normal. Her mood appears anxious. Her speech is tangential. She is inattentive.   Nursing note and vitals reviewed.    Assessment:     1. Right flank pain    2. Right upper quadrant pain    3. Liver cyst      Plan:   Right flank pain  Right upper quadrant pain  -     NM Hepatobiliary (HIDA) W Pharm and Ejec; Future; Expected date: 11/27/2017  -     dicyclomine (BENTYL) 20 mg tablet; Take 1 tablet (20 mg total) by mouth 4 (four) times daily before meals and nightly.  Dispense: 60 tablet; Refill: 0  Trial of Bentyl for possible IBS.  Check HIDA scan " to rule out biliary dyskinesia.    Liver cyst  Simple cyst small size no additional evaluation needed    Follow-up Dr. Hunter in 2 weeks.

## 2017-11-29 ENCOUNTER — HOSPITAL ENCOUNTER (OUTPATIENT)
Dept: RADIOLOGY | Facility: HOSPITAL | Age: 37
Discharge: HOME | End: 2017-11-29
Attending: FAMILY MEDICINE
Payer: COMMERCIAL

## 2017-11-29 DIAGNOSIS — R10.9 RIGHT FLANK PAIN: ICD-10-CM

## 2017-11-29 DIAGNOSIS — R10.11 RIGHT UPPER QUADRANT PAIN: ICD-10-CM

## 2017-11-29 PROCEDURE — B4155 EF INCOMPLETE/MODULAR: HCPCS

## 2017-11-29 PROCEDURE — 78227 HEPATOBIL SYST IMAGE W/DRUG: CPT | Mod: TC

## 2017-11-30 ENCOUNTER — TELEPHONE (OUTPATIENT)
Dept: INTERNAL MEDICINE | Facility: CLINIC | Age: 37
End: 2017-11-30

## 2017-11-30 DIAGNOSIS — K82.8 BILIARY DYSKINESIA: Primary | ICD-10-CM

## 2017-11-30 NOTE — TELEPHONE ENCOUNTER
----- Message from Regino Salas MD sent at 11/30/2017  9:08 AM CST -----  Ejection fraction less than 40%, consistent with biliary dyskinesia.  Would recommend referral to Gen. surgery to discuss possible gallbladder removal.    Order placed.

## 2017-12-01 ENCOUNTER — OFFICE VISIT (OUTPATIENT)
Dept: SURGERY | Facility: CLINIC | Age: 37
End: 2017-12-01
Payer: COMMERCIAL

## 2017-12-01 VITALS
HEART RATE: 78 BPM | TEMPERATURE: 99 F | WEIGHT: 188.06 LBS | BODY MASS INDEX: 33.32 KG/M2 | DIASTOLIC BLOOD PRESSURE: 77 MMHG | SYSTOLIC BLOOD PRESSURE: 119 MMHG | HEIGHT: 63 IN

## 2017-12-01 DIAGNOSIS — K82.8 BILIARY DYSKINESIA: Primary | ICD-10-CM

## 2017-12-01 PROCEDURE — 99204 OFFICE O/P NEW MOD 45 MIN: CPT | Mod: S$GLB,,, | Performed by: SURGERY

## 2017-12-01 PROCEDURE — 99999 PR PBB SHADOW E&M-EST. PATIENT-LVL IV: CPT | Mod: PBBFAC,,, | Performed by: SURGERY

## 2017-12-01 RX ORDER — SODIUM CHLORIDE 9 MG/ML
INJECTION, SOLUTION INTRAVENOUS CONTINUOUS
Status: CANCELLED | OUTPATIENT
Start: 2017-12-01

## 2017-12-01 NOTE — LETTER
December 1, 2017      Regino Salas MD  [address] [place] [address]           [facility]  [address]  [place] [address]  Phone: [phone]  Fax: [phone]          Patient: Paty Parham   MR Number: [id number]   YOB: 1980   Date of Visit: 12/1/2017       Dear Dr. Regino Salas:    Thank you for referring Paty Parham to me for evaluation. Attached you will find relevant portions of my assessment and plan of care.    If you have questions, please do not hesitate to call me. I look forward to following Paty Parham along with you.    Sincerely,    Iván Jones MD    Enclosure  CC:  No Recipients    If you would like to receive this communication electronically, please contact [email or web address] or [phone] to request more information on [facility] access.    For providers and/or their staff who would like to refer a patient to [facility], please contact us through our one-stop-shop provider referral line, Deepthi Ferguson, at [phone].    If you feel you have received this communication in error or would no longer like to receive these types of communications, please e-mail [email or web address]

## 2017-12-01 NOTE — H&P
History & Physical    SUBJECTIVE:     History of Present Illness:  Patient is a 37 y.o. female presents with known diagnosis of biliary dyskinesia. Onset of symptoms was gradual starting several months ago with gradually worsening course since that time. Patient denies of nausea and vomiting. She continues to experience all achy back pain.  The HIDA scan revealed ejection fraction being 29%.  With the diagnosis of biliary dyskinesia, the patient would like to proceed to have surgical treatment.    Chief Complaint   Patient presents with    Results     going over scan and possible surgery for gallbladder       Review of patient's allergies indicates:   Allergen Reactions    Wasp venom Rash and Other (See Comments)     Mental confusion.       Current Outpatient Prescriptions   Medication Sig Dispense Refill    buPROPion (WELLBUTRIN XL) 300 MG 24 hr tablet Take 1 tablet (300 mg total) by mouth once daily. 90 tablet 3    diazePAM (VALIUM) 2 MG tablet Take 1 tablet (2 mg total) by mouth every 12 (twelve) hours as needed for Anxiety. 60 tablet 4    nitroGLYCERIN (NITROSTAT) 0.4 MG SL tablet Take one by mouth as needed at onset of chest pain. 25 tablet 2    SYNTHROID 50 mcg tablet Take 1 tablet (50 mcg total) by mouth once daily. 90 tablet 3    ZENCHENT FE 0.4mg-35mcg(21) and 75 mg (7) Chew Take 1 tablet by mouth once daily.  1    dicyclomine (BENTYL) 20 mg tablet Take 1 tablet (20 mg total) by mouth 4 (four) times daily before meals and nightly. 60 tablet 0     No current facility-administered medications for this visit.        Past Medical History:   Diagnosis Date    Depression     Hypertension     Hypothyroidism     Meniere's disease, left ear     Plantar fascial fibromatosis     Recurrent suppurative otitis media of right ear with spontaneous tympanic membrane rupture     Vertigo      Past Surgical History:   Procedure Laterality Date    EPIDURAL BLOCK INJECTION      WISDOM TOOTH EXTRACTION      X 1  "    Family History   Problem Relation Age of Onset    Hypothyroidism Mother     Hyperparathyroidism Mother     Sjogren's syndrome Mother     Ankylosing spondylitis Mother     Sarcoidosis Mother     ADD / ADHD Brother     Hypertension Brother     Hyperlipidemia Brother      Social History   Substance Use Topics    Smoking status: Never Smoker    Smokeless tobacco: Never Used    Alcohol use Yes      Comment: <3 drinks per week.        Review of Systems:  Review of Systems   Constitutional: Negative for chills and fever.   HENT: Negative for sore throat and trouble swallowing.    Eyes: Negative.    Respiratory: Negative for cough and shortness of breath.    Cardiovascular: Negative.    Gastrointestinal: Negative for abdominal distention, abdominal pain, nausea and vomiting.   Endocrine: Negative.    Genitourinary: Negative.    Musculoskeletal: Positive for back pain.   Skin: Negative.    Allergic/Immunologic: Negative.    Neurological: Negative.    Hematological: Does not bruise/bleed easily.   Psychiatric/Behavioral: Negative.        OBJECTIVE:     Vital Signs (Most Recent)  Temp: 99 °F (37.2 °C) (12/01/17 1024)  Pulse: 78 (12/01/17 1024)  BP: 119/77 (12/01/17 1024)  5' 3" (1.6 m)  85.3 kg (188 lb 0.8 oz)     Physical Exam:  Physical Exam   Constitutional: She is oriented to person, place, and time. She appears well-developed and well-nourished.   HENT:   Head: Normocephalic.   Right Ear: External ear normal.   Left Ear: External ear normal.   Nose: Nose normal.   Eyes: Pupils are equal, round, and reactive to light. No scleral icterus.   Neck: Normal range of motion. Neck supple. No thyromegaly present.   Cardiovascular: Normal rate, regular rhythm and normal heart sounds.    No murmur heard.  Pulmonary/Chest: Effort normal and breath sounds normal.   Abdominal: Soft. Bowel sounds are normal. There is no tenderness. There is no guarding.   Musculoskeletal: Normal range of motion.   Lymphadenopathy:     She " has no cervical adenopathy.   Neurological: She is alert and oriented to person, place, and time.   Skin: Skin is warm and dry.       Laboratory  Lab Results   Component Value Date    WBC 9.75 11/20/2017    HGB 13.7 11/20/2017    HCT 42.7 11/20/2017     (H) 11/20/2017    ALT 8 (L) 11/20/2017    AST 16 11/20/2017     11/20/2017    K 4.5 11/20/2017     11/20/2017    CREATININE 1.0 11/20/2017    BUN 12 11/20/2017    CO2 25 11/20/2017    TSH 1.707 08/31/2017       No results found for this or any previous visit.      Diagnostic Results:  Labs: Reviewed  US: Reviewed    None    ASSESSMENT/PLAN:     Biliary dyskinesia    PLAN:Plan     The recommendation is proceed with robotic cholecystectomy.  The risk and the benefit of the procedure were fully addressed with the patient.  Procedure will be performed on December 21 of 2017 at 12 noon.    Iván Jones

## 2017-12-20 ENCOUNTER — ANESTHESIA EVENT (OUTPATIENT)
Dept: SURGERY | Facility: HOSPITAL | Age: 37
End: 2017-12-20
Payer: COMMERCIAL

## 2017-12-20 NOTE — PRE ADMISSION SCREENING
Pre op instructions reviewed with patient per phone:    To confirm, Your surgeon has instructed you:  Surgery is scheduled 12/21/17 at 1230.      [facility] main lobby by 1100.   Pre admit office to call later today only if arrival time changes.      INSTRUCTIONS IMPORTANT!!!  ¨ Do not eat, drink, or smoke after 12 midnight-including water. OK to brush teeth, no gum, candy or mints!    ¨ Take only these medicines with a small swallow of water-morning of surgery.  Valium        ____  Do not wear makeup, including mascara.  ____  No powder, lotions or creams to surgical area.  ____  Please remove all jewelry, including piercings and leave at home.  ____  No money or valuables needed. Please leave at home.  ____  Please bring identification and insurance information to hospital.  ____  If going home the same day, arrange for a ride home. You will not be able to   drive if Anesthesia was used.  ____  Children, under 12 years old, must remain in the waiting room with an adult.  They are not allowed in patient areas.  ____  Wear loose fitting clothing. Allow for dressings, bandages.  ____  Stop Aspirin, Ibuprofen, Motrin and Aleve at least 5-7 days before surgery, unless otherwise instructed by your doctor, or the nurse.   You MAY use Tylenol/acetaminophen until day of surgery.  ____  If you take diabetic medication, do not take am of surgery unless instructed by   Doctor.  ____ Stop taking any Fish Oil supplement or any Vitamins that contain Vitamin E at least 5 days prior to surgery.          Bathing Instructions-- The night before surgery and the morning prior to coming to the hospital:   -Do not shave the surgical area.   -Shower and wash your hair and body as usual with anti-bacterial  soap and shampoo.   -Rinse your hair and body completely.   -Use one packet of hibiclens to wash the surgical site (using your hand) gently for 5 minutes.  Do not scrub you skin too  hard.   -Do not use hibiclens on your head, face, or genitals.   -Do not wash with anti-bacterial soap after you use the hibiclens.   -Rinse your body thoroughly.   -Dry with clean, soft towel.  Do not use lotion, cream, deodorant, or powders on   the surgical site.    Use antibacterial soap in place of hibiclens if your surgery is on the head, face or genitals.         Surgical Site Infection    Prevention of surgical site infections:     -Keep incisions clean and dry.   -Do not soak/submerge incisions in water until completely healed.   -Do not apply lotions, powders, creams, or deodorants to site.   -Always make sure hands are cleaned with antibacterial soap/ alcohol-based   prior to touching the surgical site.  (This includes doctors, nurses, staff, and yourself.)    Signs and symptoms:   -Redness and pain around the area where you had surgery   -Drainage of cloudy fluid from your surgical wound   -Fever over 100.4  I have read or had read and explained to me, and understand the above information.

## 2017-12-21 ENCOUNTER — SURGERY (OUTPATIENT)
Age: 37
End: 2017-12-21

## 2017-12-21 ENCOUNTER — ANESTHESIA (OUTPATIENT)
Dept: SURGERY | Facility: HOSPITAL | Age: 37
End: 2017-12-21
Payer: COMMERCIAL

## 2017-12-21 ENCOUNTER — HOSPITAL ENCOUNTER (OUTPATIENT)
Facility: HOSPITAL | Age: 37
Discharge: HOME | End: 2017-12-21
Attending: SURGERY | Admitting: SURGERY
Payer: COMMERCIAL

## 2017-12-21 DIAGNOSIS — K82.8 BILIARY DYSKINESIA: Primary | ICD-10-CM

## 2017-12-21 LAB
B-HCG UR QL: NEGATIVE
CTP QC/QA: YES

## 2017-12-21 PROCEDURE — 37000008 HC ANESTHESIA 1ST 15 MINUTES: Performed by: SURGERY

## 2017-12-21 PROCEDURE — 88304 TISSUE EXAM BY PATHOLOGIST: CPT | Performed by: PATHOLOGY

## 2017-12-21 PROCEDURE — 27201423 OPTIME MED/SURG SUP & DEVICES STERILE SUPPLY: Performed by: SURGERY

## 2017-12-21 PROCEDURE — 71000016 HC POSTOP RECOV ADDL HR: Performed by: SURGERY

## 2017-12-21 PROCEDURE — 88304 TISSUE EXAM BY PATHOLOGIST: CPT | Mod: 26,,, | Performed by: PATHOLOGY

## 2017-12-21 PROCEDURE — 25000003 PHARM REV CODE 250: Performed by: SURGERY

## 2017-12-21 PROCEDURE — 63600175 PHARM REV CODE 636 W HCPCS: Performed by: SURGERY

## 2017-12-21 PROCEDURE — 36000711: Performed by: SURGERY

## 2017-12-21 PROCEDURE — 63600175 PHARM REV CODE 636 W HCPCS: Performed by: CLINIC/CENTER

## 2017-12-21 PROCEDURE — 63600175 PHARM REV CODE 636 W HCPCS: Performed by: ANESTHESIOLOGY

## 2017-12-21 PROCEDURE — 25000003 PHARM REV CODE 250: Performed by: CLINIC/CENTER

## 2017-12-21 PROCEDURE — 71000015 HC POSTOP RECOV 1ST HR: Performed by: SURGERY

## 2017-12-21 PROCEDURE — 47563 LAPARO CHOLECYSTECTOMY/GRAPH: CPT | Mod: ,,, | Performed by: SURGERY

## 2017-12-21 PROCEDURE — 81025 URINE PREGNANCY TEST: CPT | Performed by: SURGERY

## 2017-12-21 PROCEDURE — 37000009 HC ANESTHESIA EA ADD 15 MINS: Performed by: SURGERY

## 2017-12-21 PROCEDURE — 71000033 HC RECOVERY, INTIAL HOUR: Performed by: SURGERY

## 2017-12-21 PROCEDURE — 25000003 PHARM REV CODE 250: Performed by: ANESTHESIOLOGY

## 2017-12-21 PROCEDURE — 36000710: Performed by: SURGERY

## 2017-12-21 RX ORDER — BUPIVACAINE HYDROCHLORIDE 2.5 MG/ML
INJECTION, SOLUTION EPIDURAL; INFILTRATION; INTRACAUDAL
Status: DISCONTINUED | OUTPATIENT
Start: 2017-12-21 | End: 2017-12-21 | Stop reason: HOSPADM

## 2017-12-21 RX ORDER — SODIUM CHLORIDE, SODIUM LACTATE, POTASSIUM CHLORIDE, CALCIUM CHLORIDE 600; 310; 30; 20 MG/100ML; MG/100ML; MG/100ML; MG/100ML
INJECTION, SOLUTION INTRAVENOUS CONTINUOUS
Status: DISCONTINUED | OUTPATIENT
Start: 2017-12-21 | End: 2017-12-21 | Stop reason: HOSPADM

## 2017-12-21 RX ORDER — LIDOCAINE HYDROCHLORIDE 10 MG/ML
INJECTION INFILTRATION; PERINEURAL
Status: DISCONTINUED | OUTPATIENT
Start: 2017-12-21 | End: 2017-12-21

## 2017-12-21 RX ORDER — SODIUM CHLORIDE 9 MG/ML
INJECTION, SOLUTION INTRAVENOUS CONTINUOUS
Status: DISCONTINUED | OUTPATIENT
Start: 2017-12-21 | End: 2017-12-21 | Stop reason: HOSPADM

## 2017-12-21 RX ORDER — FENTANYL CITRATE 50 UG/ML
INJECTION, SOLUTION INTRAMUSCULAR; INTRAVENOUS
Status: DISCONTINUED | OUTPATIENT
Start: 2017-12-21 | End: 2017-12-21

## 2017-12-21 RX ORDER — NEOSTIGMINE METHYLSULFATE 1 MG/ML
INJECTION, SOLUTION INTRAVENOUS
Status: DISCONTINUED | OUTPATIENT
Start: 2017-12-21 | End: 2017-12-21

## 2017-12-21 RX ORDER — GLYCOPYRROLATE 0.2 MG/ML
INJECTION INTRAMUSCULAR; INTRAVENOUS
Status: DISCONTINUED | OUTPATIENT
Start: 2017-12-21 | End: 2017-12-21

## 2017-12-21 RX ORDER — MEPERIDINE HYDROCHLORIDE 50 MG/ML
12.5 INJECTION INTRAMUSCULAR; INTRAVENOUS; SUBCUTANEOUS ONCE AS NEEDED
Status: DISCONTINUED | OUTPATIENT
Start: 2017-12-21 | End: 2017-12-21 | Stop reason: HOSPADM

## 2017-12-21 RX ORDER — FENTANYL CITRATE 50 UG/ML
25 INJECTION, SOLUTION INTRAMUSCULAR; INTRAVENOUS EVERY 5 MIN PRN
Status: DISCONTINUED | OUTPATIENT
Start: 2017-12-21 | End: 2017-12-21 | Stop reason: HOSPADM

## 2017-12-21 RX ORDER — CEFAZOLIN SODIUM 2 G/50ML
2 SOLUTION INTRAVENOUS
Status: COMPLETED | OUTPATIENT
Start: 2017-12-21 | End: 2017-12-21

## 2017-12-21 RX ORDER — OXYCODONE AND ACETAMINOPHEN 10; 325 MG/1; MG/1
1 TABLET ORAL EVERY 4 HOURS PRN
Qty: 30 TABLET | Refills: 0 | Status: SHIPPED | OUTPATIENT
Start: 2017-12-21 | End: 2018-01-09

## 2017-12-21 RX ORDER — ONDANSETRON 2 MG/ML
4 INJECTION INTRAMUSCULAR; INTRAVENOUS DAILY PRN
Status: DISCONTINUED | OUTPATIENT
Start: 2017-12-21 | End: 2017-12-21 | Stop reason: HOSPADM

## 2017-12-21 RX ORDER — PROPOFOL 10 MG/ML
VIAL (ML) INTRAVENOUS
Status: DISCONTINUED | OUTPATIENT
Start: 2017-12-21 | End: 2017-12-21

## 2017-12-21 RX ORDER — MIDAZOLAM HYDROCHLORIDE 1 MG/ML
INJECTION, SOLUTION INTRAMUSCULAR; INTRAVENOUS
Status: DISCONTINUED | OUTPATIENT
Start: 2017-12-21 | End: 2017-12-21

## 2017-12-21 RX ORDER — ROCURONIUM BROMIDE 10 MG/ML
INJECTION, SOLUTION INTRAVENOUS
Status: DISCONTINUED | OUTPATIENT
Start: 2017-12-21 | End: 2017-12-21

## 2017-12-21 RX ORDER — SUCCINYLCHOLINE CHLORIDE 20 MG/ML
INJECTION INTRAMUSCULAR; INTRAVENOUS
Status: DISCONTINUED | OUTPATIENT
Start: 2017-12-21 | End: 2017-12-21

## 2017-12-21 RX ORDER — HYDROMORPHONE HYDROCHLORIDE 1 MG/ML
0.2 INJECTION, SOLUTION INTRAMUSCULAR; INTRAVENOUS; SUBCUTANEOUS EVERY 5 MIN PRN
Status: DISCONTINUED | OUTPATIENT
Start: 2017-12-21 | End: 2017-12-21 | Stop reason: HOSPADM

## 2017-12-21 RX ORDER — SODIUM CHLORIDE 0.9 % (FLUSH) 0.9 %
3 SYRINGE (ML) INJECTION
Status: DISCONTINUED | OUTPATIENT
Start: 2017-12-21 | End: 2017-12-21 | Stop reason: HOSPADM

## 2017-12-21 RX ORDER — HYDROCODONE BITARTRATE AND ACETAMINOPHEN 5; 325 MG/1; MG/1
1 TABLET ORAL EVERY 4 HOURS PRN
Status: DISCONTINUED | OUTPATIENT
Start: 2017-12-21 | End: 2017-12-21 | Stop reason: HOSPADM

## 2017-12-21 RX ORDER — INDOCYANINE GREEN AND WATER 25 MG
2.5 KIT INJECTION ONCE
Status: COMPLETED | OUTPATIENT
Start: 2017-12-21 | End: 2017-12-21

## 2017-12-21 RX ORDER — ONDANSETRON 2 MG/ML
INJECTION INTRAMUSCULAR; INTRAVENOUS
Status: DISCONTINUED | OUTPATIENT
Start: 2017-12-21 | End: 2017-12-21

## 2017-12-21 RX ADMIN — PROPOFOL 130 MG: 10 INJECTION, EMULSION INTRAVENOUS at 01:12

## 2017-12-21 RX ADMIN — BUPIVACAINE HYDROCHLORIDE 30 ML: 2.5 INJECTION, SOLUTION EPIDURAL; INFILTRATION; INTRACAUDAL; PERINEURAL at 01:12

## 2017-12-21 RX ADMIN — SODIUM CHLORIDE, SODIUM LACTATE, POTASSIUM CHLORIDE, AND CALCIUM CHLORIDE: 600; 310; 30; 20 INJECTION, SOLUTION INTRAVENOUS at 01:12

## 2017-12-21 RX ADMIN — ONDANSETRON 4 MG: 2 INJECTION, SOLUTION INTRAMUSCULAR; INTRAVENOUS at 01:12

## 2017-12-21 RX ADMIN — INDOCYANINE GREEN AND WATER 2.5 MG: KIT at 01:12

## 2017-12-21 RX ADMIN — MIDAZOLAM HYDROCHLORIDE 2 MG: 1 INJECTION, SOLUTION INTRAMUSCULAR; INTRAVENOUS at 01:12

## 2017-12-21 RX ADMIN — Medication 0.2 MG: at 02:12

## 2017-12-21 RX ADMIN — FENTANYL CITRATE 100 MCG: 50 INJECTION, SOLUTION INTRAMUSCULAR; INTRAVENOUS at 01:12

## 2017-12-21 RX ADMIN — SUCCINYLCHOLINE CHLORIDE 100 MG: 20 INJECTION, SOLUTION INTRAMUSCULAR; INTRAVENOUS at 01:12

## 2017-12-21 RX ADMIN — NEOSTIGMINE METHYLSULFATE 5 MG: 1 INJECTION INTRAVENOUS at 02:12

## 2017-12-21 RX ADMIN — ROCURONIUM BROMIDE 5 MG: 10 INJECTION, SOLUTION INTRAVENOUS at 01:12

## 2017-12-21 RX ADMIN — CEFAZOLIN SODIUM 2 G: 2 SOLUTION INTRAVENOUS at 01:12

## 2017-12-21 RX ADMIN — EPHEDRINE SULFATE 15 MG: 50 INJECTION, SOLUTION INTRAMUSCULAR; INTRAVENOUS; SUBCUTANEOUS at 01:12

## 2017-12-21 RX ADMIN — LIDOCAINE HYDROCHLORIDE 60 MG: 10 INJECTION, SOLUTION INFILTRATION; PERINEURAL at 01:12

## 2017-12-21 RX ADMIN — ONDANSETRON 4 MG: 2 INJECTION, SOLUTION INTRAMUSCULAR; INTRAVENOUS at 03:12

## 2017-12-21 RX ADMIN — ROCURONIUM BROMIDE 35 MG: 10 INJECTION, SOLUTION INTRAVENOUS at 01:12

## 2017-12-21 RX ADMIN — ROBINUL 0.5 MG: 0.2 INJECTION INTRAMUSCULAR; INTRAVENOUS at 02:12

## 2017-12-21 NOTE — PLAN OF CARE
Pt meets PACU discharge criteria. Pt has maintained vital signs WDL. Pt has maintained a patent airway.

## 2017-12-21 NOTE — INTERVAL H&P NOTE
The patient has been examined and the H&P has been reviewed:    I concur with the findings and no changes have occurred since H&P was written.    Anesthesia/Surgery risks, benefits and alternative options discussed and understood by patient/family.          Active Hospital Problems    Diagnosis  POA    Biliary dyskinesia [K82.8]  Yes      Resolved Hospital Problems    Diagnosis Date Resolved POA   No resolved problems to display.

## 2017-12-21 NOTE — DISCHARGE INSTRUCTIONS
General Information:    1.  Do not drink alcoholic beverages including beer for 24 hours or as long as you are on pain medication..  2.  Do not drive a motor vehicle, operate machinery or power tools, or signs legal papers for 24 hours or as long as you are on pain medication.   3.  You may experience light-headedness, dizziness, and sleepiness following surgery. Please do not stay alone. A responsible adult should be with you for this 24 hour period.  4.  Go home and rest.    5. Progress slowly to a normal diet unless instructed.  Otherwise, begin with liquids such as soft drinks, then soup and crackers working up to solid foods. Drink plenty of nonalcoholic fluids.  6.  Certain anesthetics and pain medications produce nausea and vomiting in certain       individuals. If nausea becomes a problem at home, call you doctor.    7. A nurse will be calling you sometime after surgery. Do not be alarmed. This is our way of finding out how you are doing.    8. Several times every hour while you are awake, take 2-3 deep breaths and cough. If you had stomach surgery hold a pillow or rolled towel firmly against your stomach before you cough. This will help with any pain the cough might cause.  9. Several times every hour while you are awake, pump and flex your feet 5-6 times and do foot circles. This will help prevent blood clots.    10.Call your doctor for severe pain, bleeding, fever, or signs or symptoms of infection (pain, swelling, redness, foul odor, drainage).    11.You can contact your doctor anytime by [phone] for the [facility] (at [facility]) or [phone] for the [facility] on [facility].   [email or web address] is another way to contact your doctor if you are an active participant online with [facility].

## 2017-12-21 NOTE — DISCHARGE SUMMARY
[facility]  Short Stay  Discharge Summary    Admit Date: 12/21/2017    Discharge Date and Time: 12/21/2017      Discharge Attending Physician: Iván Jones MD     Reason for Admission: Biliary dyskinesia    Hospital Course (synopsis of major diagnoses, care, treatment, and services provided during the course of the hospital stay): Uneventful and full recovery    Final Diagnoses:    Principal Problem: Biliary dyskinesia   Secondary Diagnoses: Biliary dyskinesia    Procedures Performed: Procedure(s) (LRB):  [id number] (N/A)      Discharged Condition: good    Disposition: Home or Self Care    Discharge Condition: Stable    Follow up/Patient Instructions:  Follow-up Information     Call Dilma Boggs PA-C.    Specialty:  General Surgery  Why:  For wound re-check  Contact information:  [address] [place] [address] [phone]                   Medications:      Medication List      START taking these medications    oxyCODONE-acetaminophen  mg per tablet  Commonly known as:  PERCOCET  Take 1 tablet by mouth every 4 (four) hours as needed for Pain.        CHANGE how you take these medications    buPROPion 300 MG 24 hr tablet  Commonly known as:  WELLBUTRIN XL  Take 1 tablet (300 mg total) by mouth once daily.  What changed:  when to take this     SYNTHROID 50 MCG tablet  Generic drug:  levothyroxine  Take 1 tablet (50 mcg total) by mouth once daily.  What changed:  when to take this        CONTINUE taking these medications    diazePAM 2 MG tablet  Commonly known as:  VALIUM  Take 1 tablet (2 mg total) by mouth every 12 (twelve) hours as needed for Anxiety.     nitroGLYCERIN 0.4 MG SL tablet  Commonly known as:  NITROSTAT  Take one by mouth as needed at onset of chest pain.     ZENCHENT FE 0.4mg-35mcg(21) and 75 mg (7) Chew  Generic drug:  noreth-ethinyl estradiol-iron        STOP taking these medications    dicyclomine 20 mg tablet  Commonly known as:  BENTYL            Where to Get Your Medications      These medications were sent to [facility]  [address] [place]  [address] [place], [place] [address]    Phone:  [phone]   · oxyCODONE-acetaminophen  mg per tablet         Discharge Procedure Orders  Diet general     Remove dressing in 48 hours     Iván Jones

## 2017-12-21 NOTE — PLAN OF CARE
Pt resting on stretcher c/o nausea, iv antiemetic administered. Abd sites remain c/d/i. Will cont to monitor. See flow sheet for detailed assessment.

## 2017-12-21 NOTE — OP NOTE
"Operative Note       SURGERY DATE:  12/21/2017    PRE-OP DIAGNOSIS:  Biliary dyskinesia [K82.8]    POST-OP DIAGNOSIS:  Biliary dyskinesia [K82.8]   Active Hospital Problems    Diagnosis  POA    *Biliary dyskinesia [K82.8]  Yes      Resolved Hospital Problems    Diagnosis Date Resolved POA   No resolved problems to display.       Procedure(s) (LRB):  [id number] (N/A)    Surgeon(s) and Role:     * Iván Jones MD - Primary    ASSISTANTS: None  ANESTHESIA: General    FINDINGS: Unremarkable.    ESTIMATED BLOOD LOSS: 5 mL              COMPLICATIONS:  None    SPECIMEN:  gall bladder    Implants: None    INDICATION:  Biliary dyskinesia    DESCRIPTION OF PROCEDURE:    The patient was taken to the operating room and underwent general anesthesia with orotracheal intubation. Once the patient was sleep, a "time-out" was called, the patient's ID, the site of surgery, the names of participants, and the planned surgery were confirmed prior to making the incision. A Verres needle was inserted on the left upper quadrant, and achieved pneumoperitoneum. Once gained access, insufflation was achieved up to 15 mm Hg. Then four 8 mm metal trocars were positioned in a line on the abdomen toward the gall bladder. The patient was then positioned in reverse trendelenburg position, and docking was completed. The gall bladder was retracted cephalad, and the Calot's triangle was exposed. With the indocyanine Green 2.5 mg injected prior to the surgery, the biliary tree was visualized. There was no filling defects in the biliary tree, and visualized the cystic duct. The cystic duct and artery were identified, and clipped twice distally and single proximally with Hemolock clips. The structures divided, and then gall bladder was then mobilized off the fossa. The gall bladder was then removed from the peritoneal cavity after placing in a 5 mm bag. With the help of Firefly, there was no evidence of bile leakage at the operative field. " The port sites were all injected with 0.25% Marcaine and wound closed in the usual fashion with 4-0 Vicryl. The incisions were dressed with steristrips and band-aids. The patient was later awakened and extubated.           CONDITION: Good    DISPOSITION: PACU - hemodynamically stable.     Iván Jones

## 2017-12-21 NOTE — ANESTHESIA PREPROCEDURE EVALUATION
12/21/2017  Paty Parham is a 37 y.o., female.    Anesthesia Evaluation    I have reviewed the Patient Summary Reports.    I have reviewed the Nursing Notes.   I have reviewed the Medications.     Review of Systems  Anesthesia Hx:  Denies Family Hx of Anesthesia complications.   Denies Personal Hx of Anesthesia complications.   OB/GYN/PEDS:  upt neg 12/21/17   Endocrine:   Hypothyroidism    Psych:   Psychiatric History          Physical Exam  General:  Well nourished    Airway/Jaw/Neck:  Airway Findings: Mouth Opening: Normal Tongue: Normal  General Airway Assessment: Adult  Mallampati: II  Improves to I with phonation.  TM Distance: Normal, at least 6 cm     Eyes/Ears/Nose:  Eyes/Ears/Nose Findings:    Dental:  Dental Findings: (upper veneers 7-10 ) In tact   Chest/Lungs:  Chest/Lungs Findings: Clear to auscultation, Normal Respiratory Rate     Heart/Vascular:  Heart Findings: Rate: Normal  Rhythm: Regular Rhythm  Sounds: Normal        Mental Status:  Mental Status Findings:  Cooperative, Alert and Oriented         Anesthesia Plan  Type of Anesthesia, risks & benefits discussed:  Anesthesia Type:  general  Patient's Preference:   Intra-op Monitoring Plan: standard ASA monitors  Intra-op Monitoring Plan Comments:   Post Op Pain Control Plan: multimodal analgesia and per primary service following discharge from PACU  Post Op Pain Control Plan Comments:   Induction:   IV  Beta Blocker:  Patient is not currently on a Beta-Blocker (No further documentation required).       Informed Consent: Patient understands risks and agrees with Anesthesia plan.  Questions answered. Anesthesia consent signed with patient.  ASA Score: 2     Day of Surgery Review of History & Physical: I have interviewed and examined the patient. I have reviewed the patient's H&P dated:  There are no significant changes.          Ready  For Surgery From Anesthesia Perspective.

## 2017-12-22 NOTE — ANESTHESIA POSTPROCEDURE EVALUATION
"Anesthesia Post Evaluation    Patient: Paty Parham    Procedure(s) Performed: Procedure(s) (LRB):  [id number] (N/A)    Final Anesthesia Type: general  Patient location during evaluation: PACU  Patient participation: Yes- Able to Participate  Level of consciousness: awake and alert  Post-procedure vital signs: reviewed and stable  Pain management: adequate  Airway patency: patent  PONV status at discharge: No PONV  Anesthetic complications: no      Cardiovascular status: blood pressure returned to baseline  Respiratory status: unassisted  Hydration status: euvolemic  Follow-up not needed.        Visit Vitals  /68   Pulse 72   Temp 36.8 °C (98.2 °F) (Temporal)   Resp 18   Ht 5' 3" (1.6 m)   Wt 84 kg (185 lb 3 oz)   LMP 11/28/2017   SpO2 99%   Breastfeeding? No   BMI 32.80 kg/m²       Pain/Wilber Score: Pain Assessment Performed: Yes (12/21/2017  3:10 PM)  Presence of Pain: complains of pain/discomfort (12/21/2017  3:10 PM)  Pain Rating Prior to Med Admin: 4 (12/21/2017  3:00 PM)  Wilber Score: 9 (12/21/2017  3:00 PM)      "

## 2018-01-09 ENCOUNTER — OFFICE VISIT (OUTPATIENT)
Dept: SURGERY | Facility: CLINIC | Age: 38
End: 2018-01-09
Payer: COMMERCIAL

## 2018-01-09 VITALS
TEMPERATURE: 99 F | DIASTOLIC BLOOD PRESSURE: 83 MMHG | HEIGHT: 63 IN | HEART RATE: 82 BPM | SYSTOLIC BLOOD PRESSURE: 123 MMHG | BODY MASS INDEX: 33.28 KG/M2 | WEIGHT: 187.81 LBS

## 2018-01-09 DIAGNOSIS — Z98.890 POST-OPERATIVE STATE: Primary | ICD-10-CM

## 2018-01-09 PROCEDURE — 99024 POSTOP FOLLOW-UP VISIT: CPT | Mod: S$GLB,,, | Performed by: PHYSICIAN ASSISTANT

## 2018-01-09 PROCEDURE — 99999 PR PBB SHADOW E&M-EST. PATIENT-LVL III: CPT | Mod: PBBFAC,,, | Performed by: PHYSICIAN ASSISTANT

## 2018-01-09 NOTE — PROGRESS NOTES
Paty Parham is status post robotic assisted lap sarthak and presents today for follow-up care.  She is tolerating a regular diet and denies fevers, nausea or vomiting.    PE: Abdomen is soft, non-tender, non-distended.  Incisions clean, dry, and intact.    Pathology report was reviewed with the patient, which showed gallbladder, biliary dyskinesia by clinical history. .    A/P:  Normal post-operative course.    The patient is instructed to avoid heavy lifting until 2 weeks after the surgery date.  Return to clinic as needed.

## 2018-01-11 VITALS
RESPIRATION RATE: 18 BRPM | TEMPERATURE: 98 F | WEIGHT: 185.19 LBS | BODY MASS INDEX: 32.81 KG/M2 | HEIGHT: 63 IN | SYSTOLIC BLOOD PRESSURE: 112 MMHG | HEART RATE: 72 BPM | DIASTOLIC BLOOD PRESSURE: 68 MMHG | OXYGEN SATURATION: 99 %

## 2018-02-06 ENCOUNTER — OFFICE VISIT (OUTPATIENT)
Dept: OTOLARYNGOLOGY | Facility: CLINIC | Age: 38
End: 2018-02-06
Payer: COMMERCIAL

## 2018-02-06 ENCOUNTER — LAB VISIT (OUTPATIENT)
Dept: LAB | Facility: HOSPITAL | Age: 38
End: 2018-02-06
Attending: PHYSICIAN ASSISTANT
Payer: COMMERCIAL

## 2018-02-06 ENCOUNTER — OFFICE VISIT (OUTPATIENT)
Dept: INTERNAL MEDICINE | Facility: CLINIC | Age: 38
End: 2018-02-06
Payer: COMMERCIAL

## 2018-02-06 ENCOUNTER — CLINICAL SUPPORT (OUTPATIENT)
Dept: AUDIOLOGY | Facility: CLINIC | Age: 38
End: 2018-02-06
Payer: COMMERCIAL

## 2018-02-06 VITALS
TEMPERATURE: 98 F | BODY MASS INDEX: 33.17 KG/M2 | SYSTOLIC BLOOD PRESSURE: 116 MMHG | HEART RATE: 78 BPM | OXYGEN SATURATION: 98 % | WEIGHT: 187.19 LBS | DIASTOLIC BLOOD PRESSURE: 84 MMHG | HEIGHT: 63 IN

## 2018-02-06 VITALS
WEIGHT: 186.06 LBS | HEART RATE: 77 BPM | TEMPERATURE: 97 F | RESPIRATION RATE: 18 BRPM | DIASTOLIC BLOOD PRESSURE: 82 MMHG | HEIGHT: 63 IN | SYSTOLIC BLOOD PRESSURE: 115 MMHG | BODY MASS INDEX: 32.97 KG/M2

## 2018-02-06 DIAGNOSIS — R42 DIZZINESS: ICD-10-CM

## 2018-02-06 DIAGNOSIS — R42 VERTIGO: Primary | ICD-10-CM

## 2018-02-06 DIAGNOSIS — R42 VERTIGO: ICD-10-CM

## 2018-02-06 LAB
ANION GAP SERPL CALC-SCNC: 7 MMOL/L
BUN SERPL-MCNC: 11 MG/DL
CALCIUM SERPL-MCNC: 9.4 MG/DL
CHLORIDE SERPL-SCNC: 109 MMOL/L
CO2 SERPL-SCNC: 26 MMOL/L
CREAT SERPL-MCNC: 0.8 MG/DL
EST. GFR  (AFRICAN AMERICAN): >60 ML/MIN/1.73 M^2
EST. GFR  (NON AFRICAN AMERICAN): >60 ML/MIN/1.73 M^2
GLUCOSE SERPL-MCNC: 78 MG/DL
POTASSIUM SERPL-SCNC: 3.7 MMOL/L
SODIUM SERPL-SCNC: 142 MMOL/L

## 2018-02-06 PROCEDURE — 3008F BODY MASS INDEX DOCD: CPT | Mod: S$GLB,,, | Performed by: PHYSICIAN ASSISTANT

## 2018-02-06 PROCEDURE — 36415 COLL VENOUS BLD VENIPUNCTURE: CPT | Mod: PO

## 2018-02-06 PROCEDURE — 92557 COMPREHENSIVE HEARING TEST: CPT | Mod: S$GLB,,, | Performed by: AUDIOLOGIST

## 2018-02-06 PROCEDURE — 99214 OFFICE O/P EST MOD 30 MIN: CPT | Mod: S$GLB,,, | Performed by: PHYSICIAN ASSISTANT

## 2018-02-06 PROCEDURE — 80048 BASIC METABOLIC PNL TOTAL CA: CPT

## 2018-02-06 PROCEDURE — 99999 PR PBB SHADOW E&M-EST. PATIENT-LVL IV: CPT | Mod: PBBFAC,,, | Performed by: FAMILY MEDICINE

## 2018-02-06 PROCEDURE — 92567 TYMPANOMETRY: CPT | Mod: S$GLB,,, | Performed by: AUDIOLOGIST

## 2018-02-06 PROCEDURE — 99999 PR PBB SHADOW E&M-EST. PATIENT-LVL III: CPT | Mod: PBBFAC,,, | Performed by: PHYSICIAN ASSISTANT

## 2018-02-06 PROCEDURE — 3008F BODY MASS INDEX DOCD: CPT | Mod: S$GLB,,, | Performed by: FAMILY MEDICINE

## 2018-02-06 PROCEDURE — 99214 OFFICE O/P EST MOD 30 MIN: CPT | Mod: S$GLB,,, | Performed by: FAMILY MEDICINE

## 2018-02-06 RX ORDER — MECLIZINE HYDROCHLORIDE 25 MG/1
25 TABLET ORAL 3 TIMES DAILY PRN
Qty: 45 TABLET | Refills: 1 | Status: SHIPPED | OUTPATIENT
Start: 2018-02-06 | End: 2018-03-04 | Stop reason: SDUPTHER

## 2018-02-06 RX ORDER — TRIAMTERENE/HYDROCHLOROTHIAZID 37.5-25 MG
1 TABLET ORAL DAILY
Qty: 30 TABLET | Refills: 11 | Status: SHIPPED | OUTPATIENT
Start: 2018-02-06 | End: 2018-11-29 | Stop reason: SDUPTHER

## 2018-02-06 NOTE — PATIENT INSTRUCTIONS
Managing Dizziness (Vertigo) with Medicines    Although medicines can't cure your problem, they can help control symptoms. Your doctor may prescribe medicines for a few weeks and then taper them off. Always take your medicine as prescribed. Never share your medicine with others.  Contact your healthcare provider right away if you have side effects from your medicines.   How medicines can help  · Treat infection or inflammation. If you have an infection caused by bacteria, your doctor can prescribe antibiotics.  · Limit conflicting balance signals. These medicines are often in pill form.  · Ease nausea. Suppositories, pills, or shots can reduce vomiting.  · Reduce pressure in the canals. Diuretics can be used to treat Meniere's disease. These medicines help your body get rid of extra fluid.  · Ease other symptoms. Other medicines can help ease depression and anxiety caused by living with dizziness or fainting.  Date Last Reviewed: 11/1/2016  © 1799-2662 [employer]. [address] [place] [address]. All rights reserved. This information is not intended as a substitute for professional medical care. Always follow your healthcare professional's instructions.        Dizziness (Vertigo) and Balance Problems: Staying Safe     Replace burned-out lightbulbs to keep your home safe and well lit.   Falls or accidents can lead to pain, broken bones, and fear of future falls. Protect yourself and others by preparing for episodes. Simple steps can help you stay safe at home and wherever you go.  Lighting  Keep all areas well lit. This helps your eyes send the right signals to the brain. It also makes you less likely to trip and fall. If bright lights make symptoms worse, dim the lights or lie in a dark room until the dizziness passes. Then turn the lights back to their normal level.  Tips:  · Keep a flashlight by the bed.  · Place nightlights in bathrooms and hallways.  · Replace burned-out bulbs, or have  someone replace them for you.  Preventing falls  To reduce your risk of falling:  · Get out of bed or up from a chair slowly.  · Wear low-heeled shoes that fit properly and have slip-resistant soles.  · Remove throw rugs. Clear clutter from walkways.  · Use handrails on stairs. Have handrails installed or adjusted if needed.  · Install grab bars in the bathroom. Don't use towel racks for balance.  · Use a shower stool. Also put adhesive strips in the shower or on the tub floor.  Going out  With a little time and preparation, you can get around safely.  Tips:  · Bring a cane or walking aid if needed.  · Give yourself plenty of time in case you start to get dizzy.  · Ask your healthcare provider what type of exercise is safe for your condition.  · Be patient. If an activity such as walking through a crowded shop causes you stress, you may not be ready for it yet.  Driving  If you become dizzy or disoriented while driving, you could hurt yourself and others. That's why it's best to not drive until symptoms have gone away. In some cases, your license may be temporarily held until it's safe for you to drive again.  For safety:  · Ask a friend to drive for you.  · Take public transportation.  · Walk to stores and other places when you can.  Asking for help  Don't be afraid to ask for help running errands, cooking meals, and doing exercise. Whether it's a friend, loved one, neighbor, or stranger on the street, a little help can make a world of difference.   Date Last Reviewed: 11/1/2016  © 3552-2976 [employer]. [address] [place] [address], [place] [address]. All rights reserved. This information is not intended as a substitute for professional medical care. Always follow your healthcare professional's instructions.

## 2018-02-06 NOTE — PROGRESS NOTES
"HEALTH MAINTENANCE REVIEW  Health Maintenance   Topic Date Due    Pap Smear with HPV Cotest  07/03/2001    Influenza Vaccine  08/01/2017    TETANUS VACCINE  10/12/2025    Lipid Panel  Completed        HEALTH MAINTENANCE INTERVENTIONS - DUE OR DUE SOON  Health Maintenance Due   Topic Date Due    Pap Smear with HPV Cotest  07/03/2001    Influenza Vaccine  08/01/2017       FUTURE APPOINTMENTS  Future Appointments  Date Time Provider Department Center   2/6/2018 2:00 PM Halima Ndiaye [facility] [place]       CHIEF COMPLAINT  Dizziness (room spinning 3 days)      HISTORY OF PRESENT ILLNESS  PROBLEM/CONDITION: NEW PROBLEM is vertigo. ONSET was 3-4 days ago. QUALITY described as sensation of room spinning. EXACERBATING FACTORS include changes in position. SEVERITY described as MODERATELY SEVERE. She reports no ringing in ears, changes in hearing, fever, changes in vision, or focal motor or sensory deficits. She says that she had identical problems several years ago and was diagnosed with Ménière's disease. ALLEVIATING FACTORS in the past have included meclizine.    No other complaints or concerns reported.    Problem List Items Addressed This Visit     Vertigo - Primary    Relevant Medications    meclizine (ANTIVERT) 25 mg tablet    Other Relevant Orders    Ambulatory referral to Audiology          REVIEW OF SYSTEMS  CONSTITUTIONAL: No fever or chills reported.   ENT: No otalgia or sore throat reported.   NEUROLOGIC: No seizures or syncope reported.   OPHTHALMOLOGIC: No eye pain or acute vision changes reported.    PHYSICAL EXAM  Vitals:    02/06/18 0821   BP: 116/84   BP Location: Right arm   Patient Position: Sitting   BP Method: Medium (Manual)   Pulse: 78   Temp: 97.8 °F (36.6 °C)   TempSrc: Tympanic   SpO2: 98%   Weight: 84.9 kg (187 lb 2.7 oz)   Height: 5' 3" (1.6 m)     CONSTITUTIONAL: Vital signs noted. No apparent distress. Does not appear acutely ill or septic. Appears adequately " hydrated.  EYES: Pupils equal and reactive. Extraocular movements intact. No nystagmus. Sclerae anicteric. Lids and conjunctiva unremarkable.  ENT: External ENT unremarkable. Oropharynx moist without lesion, exudate or inflammation. Posterior oropharynx symmetric with midline uvula. Lips and tongue unremarkable. Nasal mucosa pink. Ear canals unremarkable. Tympanic membranes normal. Hearing grossly intact.  PULM: Lungs clear. Breathing unlabored.  CV: Auscultation reveals regular rate and rhythm without murmur, gallop or rub. No carotid bruit.   GI: Soft and nontender. Bowel sounds normal.  DERM: Skin warm and moist with normal turgor.  NEURO: There are no gross focal motor deficits or gross deficits of cranial nerves III-XII. No tremor. No nystagmus. Observed coordination is normal.  PSYCH: Alert and oriented x 3. Mood is grossly neutral. Affect appropriate. Judgment and insight not grossly compromised.  MSK: Grossly normal stance and gait.      PAST MEDICAL HISTORY, FAMILY HISTORY, SOCIAL HISTORY, CURRENT MEDICATION LIST, and ALLERGY LIST reviewed by me (JOSSELIN Hunter MD) and are updated consistent with the patient's report.    ASSESSMENT and PLAN  Vertigo  -     Ambulatory referral to Audiology  -     meclizine (ANTIVERT) 25 mg tablet; Take 1 tablet (25 mg total) by mouth 3 (three) times daily as needed for Dizziness.  Dispense: 45 tablet; Refill: 1        PRESCRIPTION MEDICATION MANAGEMENT  Medication List with Changes/Refills   New Medications    MECLIZINE (ANTIVERT) 25 MG TABLET    Take 1 tablet (25 mg total) by mouth 3 (three) times daily as needed for Dizziness.   Current Medications    BUPROPION (WELLBUTRIN XL) 300 MG 24 HR TABLET    Take 1 tablet (300 mg total) by mouth once daily.    DIAZEPAM (VALIUM) 2 MG TABLET    Take 1 tablet (2 mg total) by mouth every 12 (twelve) hours as needed for Anxiety.    NITROGLYCERIN (NITROSTAT) 0.4 MG SL TABLET    Take one by mouth as needed at onset of chest  "pain.    SYNTHROID 50 MCG TABLET    Take 1 tablet (50 mcg total) by mouth once daily.    ZENCHENT FE 0.4MG-35MCG(21) AND 75 MG (7) CHEW    Take 1 tablet by mouth every evening.        Follow-up if symptoms worsen or fail to improve.    ABOUT THIS DOCUMENTATION:  · The order of the conditions listed in the HPI is one of convenience and does not necessarily reflect the chronology of the appointment, nor the relative importance of a condition. It is possible that additional description or status details about condition(s) may be found elsewhere in the EHR documentation for today's encounter.  · Documentation entered by me for this encounter was done in part using speech-recognition technology. Although I have made an effort to ensure accuracy, "sound like" errors may exist and should be interpreted in context.                        -JOSSELIN Hunter MD    Patient Instructions       Managing Dizziness (Vertigo) with Medicines    Although medicines can't cure your problem, they can help control symptoms. Your doctor may prescribe medicines for a few weeks and then taper them off. Always take your medicine as prescribed. Never share your medicine with others.  Contact your healthcare provider right away if you have side effects from your medicines.   How medicines can help  · Treat infection or inflammation. If you have an infection caused by bacteria, your doctor can prescribe antibiotics.  · Limit conflicting balance signals. These medicines are often in pill form.  · Ease nausea. Suppositories, pills, or shots can reduce vomiting.  · Reduce pressure in the canals. Diuretics can be used to treat Meniere's disease. These medicines help your body get rid of extra fluid.  · Ease other symptoms. Other medicines can help ease depression and anxiety caused by living with dizziness or fainting.  Date Last Reviewed: 11/1/2016  © 1712-0316 [employer]. [address] [place] [address], [place] [address]. All rights " reserved. This information is not intended as a substitute for professional medical care. Always follow your healthcare professional's instructions.        Dizziness (Vertigo) and Balance Problems: Staying Safe     Replace burned-out lightbulbs to keep your home safe and well lit.   Falls or accidents can lead to pain, broken bones, and fear of future falls. Protect yourself and others by preparing for episodes. Simple steps can help you stay safe at home and wherever you go.  Lighting  Keep all areas well lit. This helps your eyes send the right signals to the brain. It also makes you less likely to trip and fall. If bright lights make symptoms worse, dim the lights or lie in a dark room until the dizziness passes. Then turn the lights back to their normal level.  Tips:  · Keep a flashlight by the bed.  · Place nightlights in bathrooms and hallways.  · Replace burned-out bulbs, or have someone replace them for you.  Preventing falls  To reduce your risk of falling:  · Get out of bed or up from a chair slowly.  · Wear low-heeled shoes that fit properly and have slip-resistant soles.  · Remove throw rugs. Clear clutter from walkways.  · Use handrails on stairs. Have handrails installed or adjusted if needed.  · Install grab bars in the bathroom. Don't use towel racks for balance.  · Use a shower stool. Also put adhesive strips in the shower or on the tub floor.  Going out  With a little time and preparation, you can get around safely.  Tips:  · Bring a cane or walking aid if needed.  · Give yourself plenty of time in case you start to get dizzy.  · Ask your healthcare provider what type of exercise is safe for your condition.  · Be patient. If an activity such as walking through a crowded shop causes you stress, you may not be ready for it yet.  Driving  If you become dizzy or disoriented while driving, you could hurt yourself and others. That's why it's best to not drive until symptoms have gone away. In some cases,  your license may be temporarily held until it's safe for you to drive again.  For safety:  · Ask a friend to drive for you.  · Take public transportation.  · Walk to stores and other places when you can.  Asking for help  Don't be afraid to ask for help running errands, cooking meals, and doing exercise. Whether it's a friend, loved one, neighbor, or stranger on the street, a little help can make a world of difference.   Date Last Reviewed: 11/1/2016 © 2000-2017 [employer]. [address] [place] [address]. All rights reserved. This information is not intended as a substitute for professional medical care. Always follow your healthcare professional's instructions.

## 2018-02-06 NOTE — LETTER
February 6, 2018      JOSSELIN Hunter MD  [address] [address]           [facility]  [address] [address]  Phone: [phone]  Fax: [phone]          Patient: Paty Parham   MR Number: [id number]   YOB: 1980   Date of Visit: 2/6/2018       Dear Dr. JOSSELIN Hunter:    Thank you for referring Paty Parham to me for evaluation. Attached you will find relevant portions of my assessment and plan of care.    If you have questions, please do not hesitate to call me. I look forward to following Paty Parham along with you.    Sincerely,    Micaela Valles PA-C    Enclosure  CC:  No Recipients    If you would like to receive this communication electronically, please contact [email or web address] or [phone] to request more information on [employer] access.    For providers and/or their staff who would like to refer a patient to [facility], please contact us through our one-stop-shop provider referral line, [facility], at [phone].    If you feel you have received this communication in error or would no longer like to receive these types of communications, please e-mail [email or web address]

## 2018-02-06 NOTE — PROGRESS NOTES
Subjective:       Patient ID: Paty Parham is a 37 y.o. female.    Chief Complaint: Follow-up (Review of audio and pt states that she has been having vertigo, started 2 days ago, pt states no pain)    Patient is a very pleasant 37 y.o. female here to see me today for the first time for evaluation of dizziness.   She reports that the symptoms have been present for the last 3 days.  On average, the patent reports symptoms that occur approximately 3 time each years.  She describes the dizziness as whirling, a sensation of unsteadiness and a sensation of movement of surroundings and says that it lasts seconds.  She has noted no triggers   She has none.  She has not started any new medications, and has not had any recent dietary changes. She was diagnosed via [facility] with Meniere's disease 3 years ago and has been doing well until 3 days ago when she woke up symptomatic.      Review of Systems   Constitutional: Negative for chills, fatigue, fever and unexpected weight change.   HENT: Negative for congestion, dental problem, ear discharge, ear pain, facial swelling, hearing loss, nosebleeds, postnasal drip, rhinorrhea, sinus pressure, sneezing, sore throat, tinnitus, trouble swallowing and voice change.    Eyes: Negative for redness, itching and visual disturbance.   Respiratory: Negative for cough, choking, shortness of breath and wheezing.    Cardiovascular: Negative for chest pain and palpitations.   Gastrointestinal: Positive for nausea. Negative for abdominal pain.        No reflux.   Musculoskeletal: Negative for gait problem.   Skin: Negative for rash.   Neurological: Positive for dizziness and headaches. Negative for light-headedness.       Objective:      Physical Exam   Constitutional: She is oriented to person, place, and time. She appears well-developed and well-nourished. No distress.   HENT:   Head: Normocephalic and atraumatic.   Right Ear: Tympanic membrane, external ear and ear canal normal.   Left  Ear: Tympanic membrane, external ear and ear canal normal.   Nose: Nose normal. No mucosal edema, rhinorrhea, nasal deformity or septal deviation. No epistaxis. Right sinus exhibits no maxillary sinus tenderness and no frontal sinus tenderness. Left sinus exhibits no maxillary sinus tenderness and no frontal sinus tenderness.   Mouth/Throat: Uvula is midline, oropharynx is clear and moist and mucous membranes are normal. Mucous membranes are not pale and not dry. No dental caries. No oropharyngeal exudate or posterior oropharyngeal erythema.   Eyes: Conjunctivae, EOM and lids are normal. Pupils are equal, round, and reactive to light. Right eye exhibits no chemosis. Left eye exhibits no chemosis. Right conjunctiva is not injected. Left conjunctiva is not injected. No scleral icterus. Right eye exhibits normal extraocular motion and no nystagmus. Left eye exhibits normal extraocular motion and no nystagmus.   Neck: Trachea normal and phonation normal. No tracheal tenderness present. No tracheal deviation present. No thyroid mass and no thyromegaly present.   Cardiovascular: Intact distal pulses.    Pulmonary/Chest: Effort normal. No stridor. No respiratory distress.   Abdominal: She exhibits no distension.   Lymphadenopathy:        Head (right side): No submental, no submandibular, no preauricular, no posterior auricular and no occipital adenopathy present.        Head (left side): No submental, no submandibular, no preauricular, no posterior auricular and no occipital adenopathy present.     She has no cervical adenopathy.   Neurological: She is alert and oriented to person, place, and time. No cranial nerve deficit.   Skin: Skin is warm and dry. No rash noted. No erythema.   Psychiatric: She has a normal mood and affect. Her behavior is normal.       Assessment:       1. Vertigo    2. 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  Hydrops was previously classified as Meniere's disease in which the classic constellation of symptoms included dizziness, fullness, tinnitus, and fluctuating hearing loss.  However, as more research is being done, it is now accepted that a patient with hydrops can have one, some, or all of those symptoms.  I would recommend first starting on the hydrops diet, which is a low sodium diet.  If symptoms persist, she will then have a BMP done to check electrolytes and will then start on oral dyazide.  If symptoms are persistent after a two month trial of the diuretic, will then send a referral to Dr. Leger at [facility], as there are also other treatment modalities, including surgery, available.

## 2018-03-04 DIAGNOSIS — R42 VERTIGO: ICD-10-CM

## 2018-03-06 RX ORDER — MECLIZINE HYDROCHLORIDE 25 MG/1
TABLET ORAL
Qty: 45 TABLET | Refills: 1 | Status: SHIPPED | OUTPATIENT
Start: 2018-03-06 | End: 2019-10-14

## 2018-03-06 NOTE — TELEPHONE ENCOUNTER
Paty Duque.    I have approved your refill request as follows:  -     meclizine (ANTIVERT) 25 mg tablet; take 1 tablet by mouth three times a day if needed for dizziness  Dispense: 45 tablet; Refill: 1    I sent the prescription electronically to the pharmacy you have listed as your preferred pharmacy[id number] [place], [place]; Phone: [phone]    Please write me back with an update on how you are doing.    Thanks for letting me care for you.    Kind regards,    JOSSELIN Hunter M.D.  ----------------------------------------------------------------  UPCOMING APPOINTMENTS  ----------------------------------------------------------------  No future appointments.     ----------------------------------------------------------------  TO SCHEDULE OR CHANGE AN APPOINTMENT  ----------------------------------------------------------------  *Login to your [facility] account at [email or web address]  *Use the [facility] angela on your mobile device   or  *Call our appointment desk at [phone].    ----------------------------------------------------------------  ADDITIONAL IMPORTANT INFORMATION  ----------------------------------------------------------------  *[employer] [employer] [facility] is NOT to be used for urgent needs.  *Although we try to respond to messages within 2 business days, a response can take longer, depending on circumstances.  *For medical emergencies, dial 911.    You can get help with [facility] and [facility] by email at  [email or web address] or by phone at [phone]. The [facility] Patient Support Team is available Monday through Friday from 9 a.m. until 5 p.m.  (After hours, you can leave a message requesting assistance.)

## 2018-04-08 NOTE — PROGRESS NOTES
CHIEF COMPLAINT  Flank Pain (intermittent x 1 week, right-sided) and Nausea      HISTORY OF PRESENT ILLNESS    PROBLEM/CONDITION: NEW PROBLEM is flank pain. QUALITY described as sharp and aching. LOCATION is right flank. ONSET over the last week. Cannot identify EXACERBATING or ALLEVIATING FACTORS. SEVERITY described as MODERATE to MODERATELY SEVERE at worst. TIMING described as waxing and waning. We discussed differential diagnosis. It was agreed to evaluate with tests as ordered.    PROBLEM/CONDITION: She had previously reported non-exertional chest pain, TIMING of events occurring typically in the early morning hours. We tried her on empiric calcium channel blocker (amlodipine) for working diagnosis of faces plastic angina. She reported that the medication made no difference in her symptoms, but caused exacerbation of underlying MILD chronic familial lymphedema. She report that the previously reported chest pain is not bothering her sufficient that she would want further evaluation or treatment. She will let me know if she changes her mind.    No other complaints or concerns reported.    PAST MEDICAL HISTORY, FAMILY HISTORY and SOCIAL HISTORY reviewed by me (JOSSELIN Hunter MD) and are updated consistent with the patient's report.    CURRENT MEDICATION LIST, and ALLERGY LIST reviewed by me (JOSSELIN Hunter MD) and are updated consistent with the patient's report as follows:  Outpatient Prescriptions Marked as Taking for the 11/20/17 encounter (Office Visit) with JOSSELIN Hunter MD   Medication Sig Dispense Refill    buPROPion (WELLBUTRIN XL) 300 MG 24 hr tablet Take 1 tablet (300 mg total) by mouth once daily. (Patient taking differently: Take 300 mg by mouth every evening. ) 90 tablet 3    diazePAM (VALIUM) 2 MG tablet Take 1 tablet (2 mg total) by mouth every 12 (twelve) hours as needed for Anxiety. 60 tablet 4    SYNTHROID 50 mcg tablet Take 1 tablet (50 mcg total) by mouth once daily. (Patient  "taking differently: Take 50 mcg by mouth every evening. ) 90 tablet 3    ZENCHENT FE 0.4mg-35mcg(21) and 75 mg (7) Chew Take 1 tablet by mouth every evening.   1      Allergies as of 11/20/2017 - Reviewed 11/20/2017   Allergen Reaction Noted    Wasp venom Rash and Other (See Comments) 07/10/2017       REVIEW OF SYSTEMS  CONSTITUTIONAL: No fever or chills reported.   GENITOURINARY: No dysuria or hematuria reported.   GASTROINTESTINAL: No hematemesis or melena reported. No vomiting or diarrhea reported.       PHYSICAL EXAM  Vitals:    11/20/17 1605   BP: 118/86   BP Location: Right arm   Patient Position: Sitting   Pulse: 79   Temp: 96.9 °F (36.1 °C)   TempSrc: Tympanic   SpO2: 99%   Weight: 84.9 kg (187 lb 2.7 oz)   Height: 5' 3" (1.6 m)     CONSTITUTIONAL: Vital signs noted. No apparent distress. Does not appear acutely ill or septic. Appears adequately hydrated.  EYE: Sclerae anicteric. Lids and conjunctiva unremarkable.  ENT: External ENT unremarkable. Oropharynx moist.  NECK: Trachea midline. Thyroid nontender.  PULM: Lungs clear. Breathing unlabored.  CV: Auscultation reveals regular rate and rhythm without murmur, gallop or rub. No carotid bruit.  GI: Soft and nontender. Bowel sounds normal.  DERM: Skin warm and moist with normal turgor.  NEURO: There are no gross focal motor deficits or gross deficits of cranial nerves III-XII.  PSYCH: Alert and oriented x 3. Mood is grossly neutral. Affect appropriate. Judgment and insight not grossly compromised.  MSK: Grossly normal stance and gait.     ASSESSMENT and PLAN  Right flank pain  -     URINALYSIS  -     US Abdomen Complete; Future; Expected date: 11/20/2017  -     CBC auto differential; Future; Expected date: 12/04/2017  -     Comprehensive metabolic panel; Future; Expected date: 12/04/2017    Chest pain, atypical    Hereditary edema of legs      PRESCRIPTION MEDICATION MANAGEMENT  Except as noted below, CURRENT MEDICATIONS are to remain unchanged from that " "listed above.     Medications Discontinued During This Encounter   Medication Reason    amLODIPine (NORVASC) 5 MG tablet Side effects       Return if symptoms worsen or fail to improve.    ABOUT THIS DOCUMENTATION:  · The order of the conditions listed in the HPI is one of convenience and does not necessarily reflect the chronology of the appointment, nor the relative importance of a condition. It is possible that additional description or status details about condition(s) may be found elsewhere in the EHR documentation for today's encounter.  · Documentation entered by me for this encounter was done in part using speech-recognition technology. Although I have made an effort to ensure accuracy, "sound like" errors may exist and should be interpreted in context.                        -JOSSELIN Hunter MD    There are no Patient Instructions on file for this visit.      "

## 2018-05-23 ENCOUNTER — OFFICE VISIT (OUTPATIENT)
Dept: INTERNAL MEDICINE | Facility: CLINIC | Age: 38
End: 2018-05-23
Payer: COMMERCIAL

## 2018-05-23 VITALS
DIASTOLIC BLOOD PRESSURE: 86 MMHG | HEART RATE: 82 BPM | OXYGEN SATURATION: 98 % | SYSTOLIC BLOOD PRESSURE: 118 MMHG | HEIGHT: 63 IN | BODY MASS INDEX: 32.43 KG/M2 | WEIGHT: 183 LBS | TEMPERATURE: 98 F

## 2018-05-23 DIAGNOSIS — R42 VERTIGO: ICD-10-CM

## 2018-05-23 DIAGNOSIS — F41.1 GENERALIZED ANXIETY DISORDER: Primary | Chronic | ICD-10-CM

## 2018-05-23 DIAGNOSIS — E03.8 HYPOTHYROIDISM DUE TO HASHIMOTO'S THYROIDITIS: Chronic | ICD-10-CM

## 2018-05-23 DIAGNOSIS — E06.3 HYPOTHYROIDISM DUE TO HASHIMOTO'S THYROIDITIS: Chronic | ICD-10-CM

## 2018-05-23 DIAGNOSIS — F33.41 RECURRENT MAJOR DEPRESSION IN PARTIAL REMISSION: Chronic | ICD-10-CM

## 2018-05-23 PROCEDURE — 3008F BODY MASS INDEX DOCD: CPT | Mod: CPTII,S$GLB,, | Performed by: FAMILY MEDICINE

## 2018-05-23 PROCEDURE — 99999 PR PBB SHADOW E&M-EST. PATIENT-LVL III: CPT | Mod: PBBFAC,,, | Performed by: FAMILY MEDICINE

## 2018-05-23 PROCEDURE — 99214 OFFICE O/P EST MOD 30 MIN: CPT | Mod: S$GLB,,, | Performed by: FAMILY MEDICINE

## 2018-05-23 PROCEDURE — 3079F DIAST BP 80-89 MM HG: CPT | Mod: CPTII,S$GLB,, | Performed by: FAMILY MEDICINE

## 2018-05-23 PROCEDURE — 3074F SYST BP LT 130 MM HG: CPT | Mod: CPTII,S$GLB,, | Performed by: FAMILY MEDICINE

## 2018-05-23 RX ORDER — HYDROCODONE BITARTRATE AND ACETAMINOPHEN 10; 325 MG/1; MG/1
TABLET ORAL
COMMUNITY
Start: 2017-04-26 | End: 2018-05-23

## 2018-05-23 RX ORDER — DIAZEPAM 2 MG/1
2 TABLET ORAL 3 TIMES DAILY PRN
Qty: 90 TABLET | Refills: 1 | Status: SHIPPED | OUTPATIENT
Start: 2018-05-23 | End: 2018-11-29 | Stop reason: SDUPTHER

## 2018-05-23 RX ORDER — MUPIROCIN 20 MG/G
OINTMENT TOPICAL
Refills: 0 | COMMUNITY
Start: 2018-03-07 | End: 2018-11-29

## 2018-05-23 NOTE — PROGRESS NOTES
CHIEF COMPLAINT  Anxiety      HISTORY OF PRESENT ILLNESS    PROBLEM/CONDITION: She reports her vertigo is well-controlled on diuretic, prescribed by ENT.    PROBLEM/CONDITION: Hypothyroidism appears well-controlled.    PROBLEM/CONDITION: Depression appears well-controlled. No shante, hypomania, or suicidal ideations reported.    PROBLEM/CONDITION: She appears to have taken an average of 2mg diazepam per day for her anxiety over the last 6 months, Some days taking 2 (2 mg) doses, and some days taking none. She is requesting refill sufficient to last until she can get through some specific life circumstances over the next few months. She anticipates taking the medication at a rate NOT in excess of her previous use. I have observed no behaviors to suggest inappropriate use of prescribed medications. [place] Board of Pharmacy Controlled Prescription Drug Monitoring database was queried and showed no activity to suggest abuse, diversion, or other inappropriate use of prescription medications.    No other complaints or concerns reported.    Problem List Items Addressed This Visit        Psychiatric    Recurrent major depression in partial remission (Chronic)    Generalized anxiety disorder - Primary (Chronic)    Relevant Medications    diazePAM (VALIUM) 2 MG tablet       ENT    Vertigo       Endocrine    Hypothyroidism due to Hashimoto's thyroiditis (Chronic)          PAST MEDICAL HISTORY, FAMILY HISTORY and SOCIAL HISTORY reviewed by me (JOSSELIN Hunter MD) and are updated consistent with the patient's report.    CURRENT MEDICATION LIST, and ALLERGY LIST reviewed by me (JOSSELIN Hunter MD) and are updated consistent with the patient's report as follows:  Outpatient Prescriptions Marked as Taking for the 5/23/18 encounter (Office Visit) with JOSSELIN Hunter MD   Medication Sig Dispense Refill    buPROPion (WELLBUTRIN XL) 300 MG 24 hr tablet Take 1 tablet (300 mg total) by mouth once daily. (Patient taking  "differently: Take 300 mg by mouth every evening. ) 90 tablet 3    diazePAM (VALIUM) 2 MG tablet Take 1 tablet (2 mg total) by mouth 3 (three) times daily as needed for Anxiety. 90 tablet 1    meclizine (ANTIVERT) 25 mg tablet take 1 tablet by mouth three times a day if needed for dizziness 45 tablet 1    mupirocin (BACTROBAN) 2 % ointment apply to affected area twice a day UNTIL HEALED  0    SYNTHROID 50 mcg tablet Take 1 tablet (50 mcg total) by mouth once daily. (Patient taking differently: Take 50 mcg by mouth every evening. ) 90 tablet 3    triamterene-hydrochlorothiazide 37.5-25 mg (MAXZIDE-25) 37.5-25 mg per tablet Take 1 tablet by mouth once daily. 30 tablet 11    [DISCONTINUED] diazePAM (VALIUM) 2 MG tablet Take 1 tablet (2 mg total) by mouth every 12 (twelve) hours as needed for Anxiety. 60 tablet 4    [DISCONTINUED] HYDROcodone-acetaminophen (NORCO)  mg per tablet Take by mouth.      [DISCONTINUED] nitroGLYCERIN (NITROSTAT) 0.4 MG SL tablet Take one by mouth as needed at onset of chest pain. 25 tablet 2    [DISCONTINUED] ZENCHENT FE 0.4mg-35mcg(21) and 75 mg (7) Chew Take 1 tablet by mouth every evening.   1      Allergies as of 05/23/2018 - Reviewed 05/23/2018   Allergen Reaction Noted    Wasp venom Rash 07/10/2017       REVIEW OF SYSTEMS  PSYCHIATRIC: No suicidal ideations, shante or hypomania reported.  NEUROLOGIC: No seizures or disordered movement reported.  ENDOCRINE: No polyuria or polydipsia reported.     PHYSICAL EXAM  Vitals:    05/23/18 1457   BP: 118/86   BP Location: Right arm   Patient Position: Sitting   BP Method: Medium (Manual)   Pulse: 82   Temp: 98.1 °F (36.7 °C)   TempSrc: Tympanic   SpO2: 98%   Weight: 83 kg (182 lb 15.7 oz)   Height: 5' 3" (1.6 m)     CONSTITUTIONAL: Vital signs noted. No apparent distress. Does not appear acutely ill or septic. Appears adequately hydrated.  PULM: Breathing unlabored.  HEART: Regular.  DERM: Skin normothermic with normal " "gabriela.  NEURO: There are no gross focal motor deficits or gross deficits of cranial nerves III-XII.  PSYCHIATRIC: Alert and oriented x 3. Mood is grossly neutral. Affect appropriate. Judgment and insight not grossly compromised.     ASSESSMENT and PLAN  Generalized anxiety disorder  -     diazePAM (VALIUM) 2 MG tablet; Take 1 tablet (2 mg total) by mouth 3 (three) times daily as needed for Anxiety.  Dispense: 90 tablet; Refill: 1    Vertigo    Recurrent major depression in partial remission    Hypothyroidism due to Hashimoto's thyroiditis        PRESCRIPTION MEDICATION MANAGEMENT  Except as noted below, CURRENT MEDICATIONS are to remain unchanged from that listed above.    Medications Ordered This Encounter      diazePAM (VALIUM) 2 MG tablet          Sig: Take 1 tablet (2 mg total) by mouth 3 (three) times daily as needed for Anxiety.          Dispense:  90 tablet          Refill:  1  Medications Discontinued During This Encounter   Medication Reason    ZENCHENT FE 0.4mg-35mcg(21) and 75 mg (7) Chew Patient no longer taking    nitroGLYCERIN (NITROSTAT) 0.4 MG SL tablet Patient no longer taking    HYDROcodone-acetaminophen (NORCO)  mg per tablet Patient no longer taking    diazePAM (VALIUM) 2 MG tablet Reorder       Follow-up in about 3 months (around 9/3/2018) for re-evaluate problem(s) discussed today.    ABOUT THIS DOCUMENTATION:  · The order of the conditions listed in the HPI is one of convenience and does not necessarily reflect the chronology of the appointment, nor the relative importance of a condition. It is possible that additional description or status details about condition(s) may be found elsewhere in the EHR documentation for today's encounter.  · Documentation entered by me for this encounter was done in part using speech-recognition technology. Although I have made an effort to ensure accuracy, "sound like" errors may exist and should be interpreted in context.                        -L. " Jorge Alberto Hunter MD    There are no Patient Instructions on file for this visit.

## 2018-06-12 ENCOUNTER — OFFICE VISIT (OUTPATIENT)
Dept: INTERNAL MEDICINE | Facility: CLINIC | Age: 38
End: 2018-06-12
Payer: COMMERCIAL

## 2018-06-12 VITALS
HEIGHT: 63 IN | SYSTOLIC BLOOD PRESSURE: 108 MMHG | WEIGHT: 182.56 LBS | TEMPERATURE: 97 F | HEART RATE: 79 BPM | OXYGEN SATURATION: 98 % | DIASTOLIC BLOOD PRESSURE: 76 MMHG | BODY MASS INDEX: 32.35 KG/M2

## 2018-06-12 DIAGNOSIS — H65.192 ACUTE OTITIS MEDIA WITH EFFUSION OF LEFT EAR: Primary | ICD-10-CM

## 2018-06-12 PROCEDURE — 99999 PR PBB SHADOW E&M-EST. PATIENT-LVL III: CPT | Mod: PBBFAC,,, | Performed by: FAMILY MEDICINE

## 2018-06-12 PROCEDURE — 3078F DIAST BP <80 MM HG: CPT | Mod: CPTII,S$GLB,, | Performed by: FAMILY MEDICINE

## 2018-06-12 PROCEDURE — 99213 OFFICE O/P EST LOW 20 MIN: CPT | Mod: S$GLB,,, | Performed by: FAMILY MEDICINE

## 2018-06-12 PROCEDURE — 3008F BODY MASS INDEX DOCD: CPT | Mod: CPTII,S$GLB,, | Performed by: FAMILY MEDICINE

## 2018-06-12 PROCEDURE — 3074F SYST BP LT 130 MM HG: CPT | Mod: CPTII,S$GLB,, | Performed by: FAMILY MEDICINE

## 2018-06-12 RX ORDER — AMOXICILLIN 500 MG/1
1000 TABLET, FILM COATED ORAL EVERY 12 HOURS
Qty: 28 TABLET | Refills: 0 | Status: SHIPPED | OUTPATIENT
Start: 2018-06-12 | End: 2018-06-19

## 2018-06-12 NOTE — PROGRESS NOTES
CHIEF COMPLAINT  Otalgia      HISTORY OF PRESENT ILLNESS    Problem List Items Addressed This Visit        ENT    Acute otitis media with effusion of left ear - Primary    Current Assessment & Plan     LOCATION is left ear. ONSET over the last few days. SEVERITY described as MODERATE. She reports that she has had prior episodes of acute otitis media as an adult.         Relevant Medications    amoxicillin (AMOXIL) 500 MG Tab          PAST MEDICAL HISTORY, FAMILY HISTORY and SOCIAL HISTORY reviewed by me (JOSSELIN Hunter MD) and are updated consistent with the patient's report.    CURRENT MEDICATION LIST, and ALLERGY LIST reviewed by me (JOSSELIN Hunter MD) and are updated consistent with the patient's report as follows:    Outpatient Medications Prior to Visit   Medication Sig Dispense Refill    buPROPion (WELLBUTRIN XL) 300 MG 24 hr tablet Take 1 tablet (300 mg total) by mouth once daily. (Patient taking differently: Take 300 mg by mouth every evening. ) 90 tablet 3    diazePAM (VALIUM) 2 MG tablet Take 1 tablet (2 mg total) by mouth 3 (three) times daily as needed for Anxiety. 90 tablet 1    meclizine (ANTIVERT) 25 mg tablet take 1 tablet by mouth three times a day if needed for dizziness 45 tablet 1    mupirocin (BACTROBAN) 2 % ointment apply to affected area twice a day UNTIL HEALED  0    SYNTHROID 50 mcg tablet Take 1 tablet (50 mcg total) by mouth once daily. (Patient taking differently: Take 50 mcg by mouth every evening. ) 90 tablet 3    triamterene-hydrochlorothiazide 37.5-25 mg (MAXZIDE-25) 37.5-25 mg per tablet Take 1 tablet by mouth once daily. 30 tablet 11     No facility-administered medications prior to visit.        Allergies as of 06/12/2018 - Reviewed 06/12/2018   Allergen Reaction Noted    Wasp venom Rash 07/10/2017       REVIEW OF SYSTEMS  CONSTITUTIONAL: No fever or chills reported.   ENT: No sore throat reported.     PHYSICAL EXAM  Vitals:    06/12/18 1304   BP: 108/76   BP  "Location: Right arm   Patient Position: Sitting   BP Method: Medium (Manual)   Pulse: 79   Temp: 97.3 °F (36.3 °C)   TempSrc: Tympanic   SpO2: 98%   Weight: 82.8 kg (182 lb 8.7 oz)   Height: 5' 3" (1.6 m)   CONSTITUTIONAL: Vital signs noted. No apparent distress. Does not appear acutely ill or septic. Appears adequately hydrated.  EYES: Pupils equal and reactive. Extraocular movements intact. Sclerae anicteric. Lids and conjunctiva unremarkable.  ENT: External ENT grossly unremarkable. Ear canals are unremarkable. Right tympanic membrane is normal. Left tympanic membrane is dull with effusion but not inflamed or bulging. Air conduction > bone conduction on the right. Bone conduction > air conduction on the left. Nasal mucosa pink. Oropharynx moist without lesion, inflammation or exudate. Posterior oropharynx is symmetric.  NECK: Neck supple without meningismus. Trachea midline. No significant cervical lymphadenopathy.  PULM: Lungs clear. Breathing unlabored.  HEART: Auscultation reveals regular rate and rhythm without murmur, gallop or rub. No carotid bruit.  GI: Abdomen soft and nontender. Bowel sounds present.  DERM: Skin warm and moist with normal turgor.  NEURO: Strength is reasonably symmetric without gross focal motor deficits or gross deficits of cranial nerves III-XII.  PSYCH: Alert and oriented x 3. Mood is grossly euthymic. Affect appropriate. Judgment and insight not grossly compromised.  MSK: Grossly normal stance and gait.     ASSESSMENT and PLAN  Acute otitis media with effusion of left ear  -     amoxicillin (AMOXIL) 500 MG Tab; Take 2 tablets (1,000 mg total) by mouth every 12 (twelve) hours.  Dispense: 28 tablet; Refill: 0        PRESCRIPTION MEDICATION MANAGEMENT  Except as noted below, CURRENT MEDICATIONS are to remain unchanged from that listed above.    Medications Ordered This Encounter      amoxicillin (AMOXIL) 500 MG Tab          Sig: Take 2 tablets (1,000 mg total) by mouth every 12 (twelve) " "hours.          Dispense:  28 tablet          Refill:  0  There are no discontinued medications.    Follow-up if symptoms worsen or fail to improve.    ABOUT THIS DOCUMENTATION:  · The order of the conditions listed in the HPI is one of convenience and does not necessarily reflect the chronology of the appointment, nor the relative importance of a condition. It is possible that additional description or status details about condition(s) may be found elsewhere in the EHR documentation for today's encounter.  · Documentation entered by me for this encounter was done in part using speech-recognition technology. Although I have made an effort to ensure accuracy, "sound like" errors may exist and should be interpreted in context.                        -JOSSELIN Hunter MD    There are no Patient Instructions on file for this visit.         "

## 2018-06-12 NOTE — ASSESSMENT & PLAN NOTE
LOCATION is left ear. ONSET over the last few days. SEVERITY described as MODERATE. She reports that she has had prior episodes of acute otitis media as an adult.

## 2018-11-06 ENCOUNTER — PATIENT OUTREACH (OUTPATIENT)
Dept: ADMINISTRATIVE | Facility: HOSPITAL | Age: 38
End: 2018-11-06

## 2018-11-29 ENCOUNTER — OFFICE VISIT (OUTPATIENT)
Dept: INTERNAL MEDICINE | Facility: CLINIC | Age: 38
End: 2018-11-29
Payer: COMMERCIAL

## 2018-11-29 ENCOUNTER — LAB VISIT (OUTPATIENT)
Dept: LAB | Facility: HOSPITAL | Age: 38
End: 2018-11-29
Attending: FAMILY MEDICINE
Payer: COMMERCIAL

## 2018-11-29 VITALS
DIASTOLIC BLOOD PRESSURE: 72 MMHG | HEART RATE: 86 BPM | TEMPERATURE: 99 F | SYSTOLIC BLOOD PRESSURE: 112 MMHG | WEIGHT: 189.81 LBS | OXYGEN SATURATION: 98 % | BODY MASS INDEX: 33.63 KG/M2 | HEIGHT: 63 IN

## 2018-11-29 DIAGNOSIS — E03.8 HYPOTHYROIDISM DUE TO HASHIMOTO'S THYROIDITIS: Chronic | ICD-10-CM

## 2018-11-29 DIAGNOSIS — F41.9 ANXIETY: ICD-10-CM

## 2018-11-29 DIAGNOSIS — I10 BENIGN ESSENTIAL HYPERTENSION: ICD-10-CM

## 2018-11-29 DIAGNOSIS — J02.9 ACUTE PHARYNGITIS, UNSPECIFIED ETIOLOGY: Primary | ICD-10-CM

## 2018-11-29 DIAGNOSIS — D75.839 THROMBOCYTOSIS: ICD-10-CM

## 2018-11-29 DIAGNOSIS — K82.8 BILIARY DYSKINESIA: ICD-10-CM

## 2018-11-29 DIAGNOSIS — F41.1 GENERALIZED ANXIETY DISORDER: Chronic | ICD-10-CM

## 2018-11-29 DIAGNOSIS — E06.3 HYPOTHYROIDISM DUE TO HASHIMOTO'S THYROIDITIS: Chronic | ICD-10-CM

## 2018-11-29 DIAGNOSIS — F33.41 RECURRENT MAJOR DEPRESSION IN PARTIAL REMISSION: Chronic | ICD-10-CM

## 2018-11-29 PROBLEM — H65.192 ACUTE OTITIS MEDIA WITH EFFUSION OF LEFT EAR: Status: RESOLVED | Noted: 2018-06-12 | Resolved: 2018-11-29

## 2018-11-29 LAB
ALBUMIN SERPL BCP-MCNC: 3.1 G/DL
ALP SERPL-CCNC: 87 U/L
ALT SERPL W/O P-5'-P-CCNC: 20 U/L
ANION GAP SERPL CALC-SCNC: 9 MMOL/L
AST SERPL-CCNC: 24 U/L
BASOPHILS # BLD AUTO: 0.05 K/UL
BASOPHILS NFR BLD: 0.6 %
BILIRUB SERPL-MCNC: 0.2 MG/DL
BUN SERPL-MCNC: 11 MG/DL
CALCIUM SERPL-MCNC: 9.3 MG/DL
CHLORIDE SERPL-SCNC: 105 MMOL/L
CO2 SERPL-SCNC: 26 MMOL/L
CREAT SERPL-MCNC: 0.9 MG/DL
DEPRECATED S PYO AG THROAT QL EIA: NEGATIVE
DIFFERENTIAL METHOD: ABNORMAL
EOSINOPHIL # BLD AUTO: 0.3 K/UL
EOSINOPHIL NFR BLD: 3 %
ERYTHROCYTE [DISTWIDTH] IN BLOOD BY AUTOMATED COUNT: 13 %
EST. GFR  (AFRICAN AMERICAN): >60 ML/MIN/1.73 M^2
EST. GFR  (NON AFRICAN AMERICAN): >60 ML/MIN/1.73 M^2
GLUCOSE SERPL-MCNC: 71 MG/DL
HCT VFR BLD AUTO: 42.5 %
HGB BLD-MCNC: 13.5 G/DL
IMM GRANULOCYTES # BLD AUTO: 0.02 K/UL
IMM GRANULOCYTES NFR BLD AUTO: 0.2 %
LYMPHOCYTES # BLD AUTO: 2.8 K/UL
LYMPHOCYTES NFR BLD: 33.2 %
MCH RBC QN AUTO: 30.8 PG
MCHC RBC AUTO-ENTMCNC: 31.8 G/DL
MCV RBC AUTO: 97 FL
MONOCYTES # BLD AUTO: 0.8 K/UL
MONOCYTES NFR BLD: 9.1 %
NEUTROPHILS # BLD AUTO: 4.5 K/UL
NEUTROPHILS NFR BLD: 53.9 %
NRBC BLD-RTO: 0 /100 WBC
PLATELET # BLD AUTO: 390 K/UL
PMV BLD AUTO: 9.6 FL
POTASSIUM SERPL-SCNC: 3.7 MMOL/L
PROT SERPL-MCNC: 7.5 G/DL
RBC # BLD AUTO: 4.39 M/UL
SODIUM SERPL-SCNC: 140 MMOL/L
TSH SERPL DL<=0.005 MIU/L-ACNC: 1.25 UIU/ML
WBC # BLD AUTO: 8.35 K/UL

## 2018-11-29 PROCEDURE — 99999 PR PBB SHADOW E&M-EST. PATIENT-LVL III: CPT | Mod: PBBFAC,,, | Performed by: FAMILY MEDICINE

## 2018-11-29 PROCEDURE — 3078F DIAST BP <80 MM HG: CPT | Mod: CPTII,S$GLB,, | Performed by: FAMILY MEDICINE

## 2018-11-29 PROCEDURE — 87081 CULTURE SCREEN ONLY: CPT

## 2018-11-29 PROCEDURE — 3074F SYST BP LT 130 MM HG: CPT | Mod: CPTII,S$GLB,, | Performed by: FAMILY MEDICINE

## 2018-11-29 PROCEDURE — 3008F BODY MASS INDEX DOCD: CPT | Mod: CPTII,S$GLB,, | Performed by: FAMILY MEDICINE

## 2018-11-29 PROCEDURE — 87880 STREP A ASSAY W/OPTIC: CPT | Mod: PO

## 2018-11-29 PROCEDURE — 84443 ASSAY THYROID STIM HORMONE: CPT

## 2018-11-29 PROCEDURE — 80053 COMPREHEN METABOLIC PANEL: CPT

## 2018-11-29 PROCEDURE — 85025 COMPLETE CBC W/AUTO DIFF WBC: CPT

## 2018-11-29 PROCEDURE — 36415 COLL VENOUS BLD VENIPUNCTURE: CPT | Mod: PO

## 2018-11-29 PROCEDURE — 99214 OFFICE O/P EST MOD 30 MIN: CPT | Mod: S$GLB,,, | Performed by: FAMILY MEDICINE

## 2018-11-29 RX ORDER — TRIAMTERENE/HYDROCHLOROTHIAZID 37.5-25 MG
1 TABLET ORAL DAILY
Qty: 90 TABLET | Refills: 3 | Status: SHIPPED | OUTPATIENT
Start: 2018-11-29 | End: 2019-10-14 | Stop reason: SDUPTHER

## 2018-11-29 RX ORDER — BUPROPION HYDROCHLORIDE 300 MG/1
300 TABLET ORAL DAILY
Qty: 90 TABLET | Refills: 3 | Status: SHIPPED | OUTPATIENT
Start: 2018-11-29 | End: 2019-01-16 | Stop reason: SDUPTHER

## 2018-11-29 RX ORDER — DIAZEPAM 2 MG/1
2 TABLET ORAL 3 TIMES DAILY PRN
Qty: 90 TABLET | Refills: 1 | Status: SHIPPED | OUTPATIENT
Start: 2018-11-29 | End: 2019-12-20 | Stop reason: SDUPTHER

## 2018-11-29 RX ORDER — LEVOTHYROXINE SODIUM 50 UG/1
50 TABLET ORAL DAILY
Qty: 90 TABLET | Refills: 3 | Status: SHIPPED | OUTPATIENT
Start: 2018-11-29 | End: 2019-12-20 | Stop reason: SDUPTHER

## 2018-11-29 NOTE — PROGRESS NOTES
CHIEF COMPLAINT: Sore throat      --------------------------------  HISTORY OF PRESENT ILLNESS  --------------------------------  PROBLEM/CONDITION: NEW PROBLEM is sore throat. QUALITY described as burning. SEVERITY described as MODERATE to MODERATELY SEVERE. LOCATION is bilateral, but worse on the right. ONSET was over the last two days. Cannot identify EXACERBATING or ALLEVIATING FACTORS. ASSOCIATED SYMPTOMS include fatigue and malaise, but do not include fever, coryza, cough, or otalgia. We discussed differential diagnosis. It was agreed to evaluate with rapid strep screen and further treatment will be based on these results.     PROBLEM/CONDITION: Hypertension appears well-controlled. No angina or angina equivalent symptoms reported.     PROBLEM/CONDITION: Hypothyroidism appears well-controlled.     PROBLEM/CONDITION: Anxiety appears well-controlled. No paranoia or disordered thinking reported.     PROBLEM/CONDITION: Previous complete blood count showed MILD nonspecific elevated platelet count.     PROBLEM/CONDITION: She reports no residual symptoms of biliary dyskinesia.       --------------------------------  REVIEW OF SYSTEMS  --------------------------------  CONSTITUTIONAL: No fever reported.  CARDIOVASCULAR: No angina reported.  PULMONARY: No hemoptysis reported.  PSYCHIATRIC: No mood instability reported.  GASTROINTESTINAL: No blood in stool reported.      --------------------------------  PHYSICAL EXAM  --------------------------------  CONSTITUTIONAL: Vital signs noted. No apparent distress. Does not appear acutely ill or septic. Appears adequately hydrated.  EYES: Pupils equal and reactive. Extraocular movements intact. Sclerae anicteric. Lids and conjunctiva unremarkable.  ENT: External ENT grossly unremarkable. Ear canals and visualized tympanic membranes are unremarkable. Hearing grossly intact. Nasal mucosa pink. Oropharynx moist with posterior erythema but without exudate. Posterior oropharynx is  symmetric.  NECK: Neck supple without meningismus. Trachea midline. She has a few mildly tender palpable cervical lymph nodes on the right.  PULM: Lungs clear. Breathing unlabored.  HEART: Auscultation reveals regular rate and rhythm without murmur, gallop or rub. No carotid bruit.  GI: Abdomen soft and nontender. Bowel sounds present.  DERM: Skin warm and moist with normal turgor.  NEURO: Strength is reasonably symmetric without gross focal motor deficits or gross deficits of cranial nerves III-XII.  PSYCH: Alert and oriented x 3. Mood is grossly euthymic. Affect appropriate. Judgment and insight not grossly compromised.  MSK: Grossly normal stance and gait.    CHIEF COMPLAINT as recorded by staff: Sore Throat      Problem List Items Addressed This Visit        Unprioritized    Benign essential hypertension    Relevant Medications    triamterene-hydrochlorothiazide 37.5-25 mg (MAXZIDE-25) 37.5-25 mg per tablet    Other Relevant Orders    Comprehensive metabolic panel (Completed)    Biliary dyskinesia    Relevant Orders    Comprehensive metabolic panel (Completed)    Hypothyroidism due to Hashimoto's thyroiditis (Chronic)    Relevant Medications    levothyroxine (SYNTHROID) 50 MCG tablet    Other Relevant Orders    TSH (Completed)    Recurrent major depression in partial remission (Chronic)    Relevant Medications    buPROPion (WELLBUTRIN XL) 300 MG 24 hr tablet    Generalized anxiety disorder (Chronic)    Relevant Medications    diazePAM (VALIUM) 2 MG tablet    buPROPion (WELLBUTRIN XL) 300 MG 24 hr tablet      Other Visit Diagnoses     Acute pharyngitis, unspecified etiology    -  Primary    Relevant Orders    THROAT SCREEN, RAPID (Completed)    Anxiety        Relevant Medications    buPROPion (WELLBUTRIN XL) 300 MG 24 hr tablet    Thrombocytosis        Relevant Orders    CBC auto differential (Completed)          Follow-up if symptoms worsen or fail to improve.    There are no Patient Instructions on file for  "this visit.    ABOUT THIS DOCUMENTATION:  · The order of the conditions listed in the HPI is one of convenience and does not necessarily reflect the chronology of the appointment, nor the relative importance of a condition.  · Documentation entered by me for this encounter was done in part using speech-recognition technology. Although I have made an effort to ensure accuracy, "sound like" errors may exist and should be interpreted in context.                        -JOSSELIN Hunter MD      "

## 2018-12-02 LAB — BACTERIA THROAT CULT: NORMAL

## 2019-01-16 DIAGNOSIS — F41.9 ANXIETY: ICD-10-CM

## 2019-01-16 DIAGNOSIS — F33.41 RECURRENT MAJOR DEPRESSION IN PARTIAL REMISSION: Chronic | ICD-10-CM

## 2019-01-17 RX ORDER — BUPROPION HYDROCHLORIDE 300 MG/1
TABLET ORAL
Qty: 90 TABLET | Refills: 3 | Status: SHIPPED | OUTPATIENT
Start: 2019-01-17 | End: 2019-12-20 | Stop reason: SDUPTHER

## 2019-04-01 ENCOUNTER — OFFICE VISIT (OUTPATIENT)
Dept: INTERNAL MEDICINE | Facility: CLINIC | Age: 39
End: 2019-04-01
Payer: COMMERCIAL

## 2019-04-01 ENCOUNTER — LAB VISIT (OUTPATIENT)
Dept: LAB | Facility: HOSPITAL | Age: 39
End: 2019-04-01
Attending: FAMILY MEDICINE
Payer: COMMERCIAL

## 2019-04-01 VITALS
SYSTOLIC BLOOD PRESSURE: 116 MMHG | HEIGHT: 63 IN | HEART RATE: 82 BPM | DIASTOLIC BLOOD PRESSURE: 84 MMHG | TEMPERATURE: 97 F | WEIGHT: 192.44 LBS | BODY MASS INDEX: 34.1 KG/M2 | OXYGEN SATURATION: 99 %

## 2019-04-01 DIAGNOSIS — E03.8 HYPOTHYROIDISM DUE TO HASHIMOTO'S THYROIDITIS: Chronic | ICD-10-CM

## 2019-04-01 DIAGNOSIS — R53.83 OTHER FATIGUE: ICD-10-CM

## 2019-04-01 DIAGNOSIS — K11.21 PAROTITIS, ACUTE: ICD-10-CM

## 2019-04-01 DIAGNOSIS — E06.3 HYPOTHYROIDISM DUE TO HASHIMOTO'S THYROIDITIS: Chronic | ICD-10-CM

## 2019-04-01 DIAGNOSIS — K11.21 PAROTITIS, ACUTE: Primary | ICD-10-CM

## 2019-04-01 LAB
ANION GAP SERPL CALC-SCNC: 13 MMOL/L (ref 8–16)
BASOPHILS # BLD AUTO: 0.05 K/UL (ref 0–0.2)
BASOPHILS NFR BLD: 0.5 % (ref 0–1.9)
BUN SERPL-MCNC: 13 MG/DL (ref 6–20)
CALCIUM SERPL-MCNC: 9.9 MG/DL (ref 8.7–10.5)
CHLORIDE SERPL-SCNC: 102 MMOL/L (ref 95–110)
CO2 SERPL-SCNC: 25 MMOL/L (ref 23–29)
CREAT SERPL-MCNC: 1 MG/DL (ref 0.5–1.4)
CRP SERPL-MCNC: 12.6 MG/L (ref 0–8.2)
DIFFERENTIAL METHOD: ABNORMAL
EOSINOPHIL # BLD AUTO: 0.2 K/UL (ref 0–0.5)
EOSINOPHIL NFR BLD: 1.5 % (ref 0–8)
ERYTHROCYTE [DISTWIDTH] IN BLOOD BY AUTOMATED COUNT: 12.6 % (ref 11.5–14.5)
ERYTHROCYTE [SEDIMENTATION RATE] IN BLOOD BY WESTERGREN METHOD: 13 MM/HR (ref 0–36)
EST. GFR  (AFRICAN AMERICAN): >60 ML/MIN/1.73 M^2
EST. GFR  (NON AFRICAN AMERICAN): >60 ML/MIN/1.73 M^2
GLUCOSE SERPL-MCNC: 78 MG/DL (ref 70–110)
HCT VFR BLD AUTO: 43.5 % (ref 37–48.5)
HGB BLD-MCNC: 14.6 G/DL (ref 12–16)
IMM GRANULOCYTES # BLD AUTO: 0.02 K/UL (ref 0–0.04)
IMM GRANULOCYTES NFR BLD AUTO: 0.2 % (ref 0–0.5)
LIPASE SERPL-CCNC: 30 U/L (ref 4–60)
LYMPHOCYTES # BLD AUTO: 3.6 K/UL (ref 1–4.8)
LYMPHOCYTES NFR BLD: 32.6 % (ref 18–48)
MCH RBC QN AUTO: 30.7 PG (ref 27–31)
MCHC RBC AUTO-ENTMCNC: 33.6 G/DL (ref 32–36)
MCV RBC AUTO: 92 FL (ref 82–98)
MONOCYTES # BLD AUTO: 0.7 K/UL (ref 0.3–1)
MONOCYTES NFR BLD: 6.4 % (ref 4–15)
NEUTROPHILS # BLD AUTO: 6.5 K/UL (ref 1.8–7.7)
NEUTROPHILS NFR BLD: 59 % (ref 38–73)
NRBC BLD-RTO: 0 /100 WBC
PLATELET # BLD AUTO: 413 K/UL (ref 150–350)
PMV BLD AUTO: 8.9 FL (ref 9.2–12.9)
POTASSIUM SERPL-SCNC: 3.4 MMOL/L (ref 3.5–5.1)
RBC # BLD AUTO: 4.75 M/UL (ref 4–5.4)
SODIUM SERPL-SCNC: 140 MMOL/L (ref 136–145)
TSH SERPL DL<=0.005 MIU/L-ACNC: 3.55 UIU/ML (ref 0.4–4)
WBC # BLD AUTO: 11.02 K/UL (ref 3.9–12.7)

## 2019-04-01 PROCEDURE — 3008F PR BODY MASS INDEX (BMI) DOCUMENTED: ICD-10-PCS | Mod: CPTII,S$GLB,, | Performed by: FAMILY MEDICINE

## 2019-04-01 PROCEDURE — 36415 COLL VENOUS BLD VENIPUNCTURE: CPT

## 2019-04-01 PROCEDURE — 99999 PR PBB SHADOW E&M-EST. PATIENT-LVL III: CPT | Mod: PBBFAC,,, | Performed by: FAMILY MEDICINE

## 2019-04-01 PROCEDURE — 3008F BODY MASS INDEX DOCD: CPT | Mod: CPTII,S$GLB,, | Performed by: FAMILY MEDICINE

## 2019-04-01 PROCEDURE — 3079F PR MOST RECENT DIASTOLIC BLOOD PRESSURE 80-89 MM HG: ICD-10-PCS | Mod: CPTII,S$GLB,, | Performed by: FAMILY MEDICINE

## 2019-04-01 PROCEDURE — 3079F DIAST BP 80-89 MM HG: CPT | Mod: CPTII,S$GLB,, | Performed by: FAMILY MEDICINE

## 2019-04-01 PROCEDURE — 99214 OFFICE O/P EST MOD 30 MIN: CPT | Mod: S$GLB,,, | Performed by: FAMILY MEDICINE

## 2019-04-01 PROCEDURE — 85025 COMPLETE CBC W/AUTO DIFF WBC: CPT

## 2019-04-01 PROCEDURE — 99214 PR OFFICE/OUTPT VISIT, EST, LEVL IV, 30-39 MIN: ICD-10-PCS | Mod: S$GLB,,, | Performed by: FAMILY MEDICINE

## 2019-04-01 PROCEDURE — 3074F SYST BP LT 130 MM HG: CPT | Mod: CPTII,S$GLB,, | Performed by: FAMILY MEDICINE

## 2019-04-01 PROCEDURE — 3074F PR MOST RECENT SYSTOLIC BLOOD PRESSURE < 130 MM HG: ICD-10-PCS | Mod: CPTII,S$GLB,, | Performed by: FAMILY MEDICINE

## 2019-04-01 PROCEDURE — 80048 BASIC METABOLIC PNL TOTAL CA: CPT

## 2019-04-01 PROCEDURE — 84443 ASSAY THYROID STIM HORMONE: CPT

## 2019-04-01 PROCEDURE — 85652 RBC SED RATE AUTOMATED: CPT

## 2019-04-01 PROCEDURE — 83690 ASSAY OF LIPASE: CPT

## 2019-04-01 PROCEDURE — 99999 PR PBB SHADOW E&M-EST. PATIENT-LVL III: ICD-10-PCS | Mod: PBBFAC,,, | Performed by: FAMILY MEDICINE

## 2019-04-01 PROCEDURE — 86140 C-REACTIVE PROTEIN: CPT

## 2019-04-02 ENCOUNTER — OFFICE VISIT (OUTPATIENT)
Dept: INTERNAL MEDICINE | Facility: CLINIC | Age: 39
End: 2019-04-02
Payer: COMMERCIAL

## 2019-04-02 VITALS
WEIGHT: 193.13 LBS | TEMPERATURE: 97 F | BODY MASS INDEX: 34.22 KG/M2 | HEIGHT: 63 IN | HEART RATE: 87 BPM | SYSTOLIC BLOOD PRESSURE: 126 MMHG | OXYGEN SATURATION: 98 % | DIASTOLIC BLOOD PRESSURE: 64 MMHG

## 2019-04-02 DIAGNOSIS — E87.6 HYPOKALEMIA: ICD-10-CM

## 2019-04-02 DIAGNOSIS — R53.83 OTHER FATIGUE: ICD-10-CM

## 2019-04-02 DIAGNOSIS — K11.21 PAROTITIS, ACUTE: Primary | ICD-10-CM

## 2019-04-02 DIAGNOSIS — D75.839 THROMBOCYTOSIS: ICD-10-CM

## 2019-04-02 DIAGNOSIS — R79.82 ELEVATED C-REACTIVE PROTEIN (CRP): ICD-10-CM

## 2019-04-02 PROCEDURE — 3008F BODY MASS INDEX DOCD: CPT | Mod: CPTII,S$GLB,, | Performed by: FAMILY MEDICINE

## 2019-04-02 PROCEDURE — 99214 OFFICE O/P EST MOD 30 MIN: CPT | Mod: S$GLB,,, | Performed by: FAMILY MEDICINE

## 2019-04-02 PROCEDURE — 3074F PR MOST RECENT SYSTOLIC BLOOD PRESSURE < 130 MM HG: ICD-10-PCS | Mod: CPTII,S$GLB,, | Performed by: FAMILY MEDICINE

## 2019-04-02 PROCEDURE — 3074F SYST BP LT 130 MM HG: CPT | Mod: CPTII,S$GLB,, | Performed by: FAMILY MEDICINE

## 2019-04-02 PROCEDURE — 99999 PR PBB SHADOW E&M-EST. PATIENT-LVL III: ICD-10-PCS | Mod: PBBFAC,,, | Performed by: FAMILY MEDICINE

## 2019-04-02 PROCEDURE — 99999 PR PBB SHADOW E&M-EST. PATIENT-LVL III: CPT | Mod: PBBFAC,,, | Performed by: FAMILY MEDICINE

## 2019-04-02 PROCEDURE — 99214 PR OFFICE/OUTPT VISIT, EST, LEVL IV, 30-39 MIN: ICD-10-PCS | Mod: S$GLB,,, | Performed by: FAMILY MEDICINE

## 2019-04-02 PROCEDURE — 3078F DIAST BP <80 MM HG: CPT | Mod: CPTII,S$GLB,, | Performed by: FAMILY MEDICINE

## 2019-04-02 PROCEDURE — 3008F PR BODY MASS INDEX (BMI) DOCUMENTED: ICD-10-PCS | Mod: CPTII,S$GLB,, | Performed by: FAMILY MEDICINE

## 2019-04-02 PROCEDURE — 3078F PR MOST RECENT DIASTOLIC BLOOD PRESSURE < 80 MM HG: ICD-10-PCS | Mod: CPTII,S$GLB,, | Performed by: FAMILY MEDICINE

## 2019-04-02 RX ORDER — AMOXICILLIN AND CLAVULANATE POTASSIUM 875; 125 MG/1; MG/1
1 TABLET, FILM COATED ORAL EVERY 12 HOURS
Qty: 20 TABLET | Refills: 0 | Status: SHIPPED | OUTPATIENT
Start: 2019-04-02 | End: 2019-04-12

## 2019-04-02 RX ORDER — TRAMADOL HYDROCHLORIDE 50 MG/1
50 TABLET ORAL 3 TIMES DAILY PRN
Qty: 21 TABLET | Refills: 0 | Status: SHIPPED | OUTPATIENT
Start: 2019-04-02 | End: 2019-04-09

## 2019-04-02 RX ORDER — NAPROXEN 500 MG/1
500 TABLET ORAL 2 TIMES DAILY PRN
Qty: 30 TABLET | Refills: 0 | Status: SHIPPED | OUTPATIENT
Start: 2019-04-02 | End: 2019-12-20

## 2019-04-02 NOTE — ASSESSMENT & PLAN NOTE
She reports symptoms are stable.  I reviewed recent lab results.  I called and discussed her case with ENT, and based on recommendations I received I am beginning empiric antibiotic treatment and evaluating with maxillofacial CT.

## 2019-04-02 NOTE — ASSESSMENT & PLAN NOTE
Persistent.  I reviewed recent lab results.  No obvious etiology identified. I expect it is related to peritonitis.

## 2019-04-02 NOTE — PROGRESS NOTES
"CHIEF COMPLAINT  Follow-up      ENCOUNTER DIAGNOSES  1. Parotitis, acute (RIGHT)    2. Other fatigue    3. Thrombocytosis    4. Hypokalemia    5. Elevated C-reactive protein (CRP)        HISTORY, ASSESSMENT, and PLAN    Problem List Items Addressed This Visit        ENT    Parotitis, acute (RIGHT) - Primary    Current Assessment & Plan     She reports symptoms are stable.  I reviewed recent lab results.  I called and discussed her case with ENT, and based on recommendations I received I am beginning empiric antibiotic treatment and evaluating with maxillofacial CT.         Relevant Medications    amoxicillin-clavulanate 875-125mg (AUGMENTIN) 875-125 mg per tablet    naproxen (NAPROSYN) 500 MG tablet    traMADol (ULTRAM) 50 mg tablet    Other Relevant Orders    CT Maxillofacial With Contrast       Oncology    Thrombocytosis    Current Assessment & Plan     Lab Results   Component Value Date    WBC 11.02 04/01/2019    HGB 14.6 04/01/2019    HCT 43.5 04/01/2019    MCV 92 04/01/2019     (H) 04/01/2019    Asymptomatic.  Mildly worse than four months ago.            Other    Other fatigue    Current Assessment & Plan     Persistent.  I reviewed recent lab results.  No obvious etiology identified. I expect it is related to peritonitis.           Other Visit Diagnoses     Hypokalemia        K = 3.4 (minimally low)    Elevated C-reactive protein (CRP)        CRP = 12.6 (MILDLY HIGH), believed to be secondary to parotitis.          REVIEW OF SYSTEMS  CONSTITUTIONAL: No fever or chills reported.   ENT: No sore throat or difficulty swallowing reported.     PHYSICAL EXAM  Vitals:    04/02/19 1542   BP: 126/64   BP Location: Left arm   Patient Position: Sitting   BP Method: Medium (Manual)   Pulse: 87   Temp: 96.7 °F (35.9 °C)   TempSrc: Tympanic   SpO2: 98%   Weight: 87.6 kg (193 lb 2 oz)   Height: 5' 3" (1.6 m)     CONSTITUTIONAL: Vital signs noted. No apparent distress. Does not appear acutely ill or septic. Appears " "adequately hydrated.  HENT: External HENT remarkable for mild swelling and moderate tenderness of right parotid gland and surrounding soft tissue, but no erythema, hyperthermia, tissue induration, or fluctuance. Oropharynx moist without lesion, exudate or inflammation. Posterior oropharynx symmetric with midline uvula. Lips and tongue unremarkable.  PULM: Breathing unlabored.  CV: Heart regular.  DERM: Skin normothermic.  PSYCHIATRIC: Alert and conversant. Grossly oriented. Mood is grossly neutral. Affect appropriate. Judgment and insight not grossly compromised.     Follow up in about 1 week (around 4/9/2019), or if symptoms worsen or fail to improve, for re-evaluate problem(s) discussed today.    Documentation entered by me for this encounter may have been done in part using speech-recognition technology. Although I have made an effort to ensure accuracy, "sound like" errors may exist and should be interpreted in context. -JOSSELIN Hunter MD   "

## 2019-04-02 NOTE — ASSESSMENT & PLAN NOTE
Lab Results   Component Value Date    WBC 11.02 04/01/2019    HGB 14.6 04/01/2019    HCT 43.5 04/01/2019    MCV 92 04/01/2019     (H) 04/01/2019    Asymptomatic.  Mildly worse than four months ago.

## 2019-04-02 NOTE — PROGRESS NOTES
CHIEF COMPLAINT  Facial Pain (Inflammation) and Fatigue      ENCOUNTER DIAGNOSES  1. Parotitis, acute    2. Hypothyroidism due to Hashimoto's thyroiditis    3. Other fatigue        HISTORY, ASSESSMENT, and PLAN    New problem is facial pain. Onset over the last day.  Location is right pre-auricular area, extending anteriorly.  Associated symptoms include ill-defined swelling of the tissue in that area.  Severity of pain described as moderate to moderately severe.  Cannot identify exacerbating or alleviating factors.  She says that in the remote past she developed a parotid duct stone that resulted in transient parotitis, and she says that the episode began with symptoms similar to her present symptoms.  I explained to her that I was uncertain of the cause of her present symptoms.  It was agreed to treat conservatively for now with ample fluid hydration and sialogogues.    She also reports fairly severe fatigue and somnolence, onset two days ago.  She reports no fever or generalized myalgias/arthralgias.    Problem List Items Addressed This Visit        ENT    Parotitis, acute (RIGHT) - Primary    Relevant Orders    CBC auto differential (Completed)    Lipase (Completed)    Basic metabolic panel (Completed)    Sedimentation rate    C-reactive protein (Completed)       Endocrine    Hypothyroidism due to Hashimoto's thyroiditis (Chronic)    Relevant Orders    TSH       Other    Other fatigue    Relevant Orders    CBC auto differential (Completed)    Basic metabolic panel (Completed)    TSH          REVIEW OF SYSTEMS  CONSTITUTIONAL: She reports some chills, but no fever or sweats reported.  ENDOCRINE: No polyuria or polydipsia reported.   GENITOURINARY: No dysuria or hematuria reported.   GASTROINTESTINAL: No vomiting or diarrhea reported.      PHYSICAL EXAM  Vitals:    04/01/19 1555   BP: 116/84   BP Location: Right arm   Patient Position: Sitting   Pulse: 82   Temp: 97.4 °F (36.3 °C)   SpO2: 99%   Weight: 87.3 kg  "(192 lb 7.4 oz)   Height: 5' 3" (1.6 m)     CONSTITUTIONAL: Vital signs noted. No apparent distress. Does not appear acutely ill or septic. Appears adequately hydrated.  EYE: Sclerae anicteric. Lids and conjunctiva unremarkable.  HENT: External HENT remarkable for mild swelling and moderate tenderness of right parotid gland and surrounding soft tissue, but no erythema, hyperthermia, tissue induration, or fluctuance. Oropharynx moist without lesion, exudate or inflammation. Posterior oropharynx symmetric with midline uvula. Lips and tongue unremarkable. Nasal mucosa pink. Ear canals unremarkable. Tympanic membranes normal. Hearing grossly intact.   NECK: Trachea midline. Thyroid nontender.  PULM: Lungs clear. Breathing unlabored.  CV: Auscultation reveals regular rate and rhythm without murmur, gallop or rub. No carotid bruit.  GI: Soft and nontender. Bowel sounds normal.  DERM: Skin warm and moist with normal turgor.  NEURO: There are no gross focal motor deficits or gross deficits of cranial nerves III-XII.  PSYCH: Alert and oriented x 3. Mood is grossly neutral. Affect appropriate. Judgment and insight not grossly compromised.  MSK: Grossly normal stance and gait.     Follow up in about 1 day (around 4/2/2019) for review test results and discuss treatment plan, re-evaluate problem(s) discussed today.    Documentation entered by me for this encounter may have been done in part using speech-recognition technology. Although I have made an effort to ensure accuracy, "sound like" errors may exist and should be interpreted in context. -JOSSELIN Hunter MD   "

## 2019-04-05 ENCOUNTER — TELEPHONE (OUTPATIENT)
Dept: RADIOLOGY | Facility: HOSPITAL | Age: 39
End: 2019-04-05

## 2019-04-05 DIAGNOSIS — K11.21 PAROTITIS, ACUTE: Primary | ICD-10-CM

## 2019-04-05 NOTE — PROGRESS NOTES
IN RESPONSE TO:    ----- Message -----   From: Amy Alexandra, RT   Sent: 4/5/2019  10:57 AM   To: Dale Valdes Staff   Subject: CT Order                                         Ms Parham is scheduled for a CT Facial Bones with contrast on Monday for parotitis. Per radiologist, please change this order to CT SOFT TISSUE NECK WITH CONTRAST to better evaluate the parotid gland.     Thanks,   Tonja   [id number]      Orders Placed This Encounter    CT Soft Tissue Neck With Contrast

## 2019-04-08 ENCOUNTER — HOSPITAL ENCOUNTER (OUTPATIENT)
Dept: RADIOLOGY | Facility: HOSPITAL | Age: 39
Discharge: HOME | End: 2019-04-08
Attending: FAMILY MEDICINE
Payer: COMMERCIAL

## 2019-04-08 DIAGNOSIS — K11.21 PAROTITIS, ACUTE: ICD-10-CM

## 2019-04-08 PROCEDURE — 70491 CT SOFT TISSUE NECK W/DYE: CPT | Mod: 26,,, | Performed by: RADIOLOGY

## 2019-04-08 PROCEDURE — 25500020 PHARM REV CODE 255: Performed by: FAMILY MEDICINE

## 2019-04-08 PROCEDURE — 70491 CT SOFT TISSUE NECK WITH CONTRAST: ICD-10-PCS | Mod: 26,,, | Performed by: RADIOLOGY

## 2019-04-08 PROCEDURE — 70491 CT SOFT TISSUE NECK W/DYE: CPT | Mod: TC

## 2019-04-08 RX ADMIN — IOHEXOL 75 ML: 350 INJECTION, SOLUTION INTRAVENOUS at 10:04

## 2019-04-12 ENCOUNTER — PATIENT MESSAGE (OUTPATIENT)
Dept: INTERNAL MEDICINE | Facility: CLINIC | Age: 39
End: 2019-04-12

## 2019-04-15 ENCOUNTER — PATIENT MESSAGE (OUTPATIENT)
Dept: INTERNAL MEDICINE | Facility: CLINIC | Age: 39
End: 2019-04-15

## 2019-10-11 ENCOUNTER — TELEPHONE (OUTPATIENT)
Dept: INTERNAL MEDICINE | Facility: CLINIC | Age: 39
End: 2019-10-11

## 2019-10-11 NOTE — TELEPHONE ENCOUNTER
Spoke with patient and she stated that she is having vertigo and wanted an appointment with our ENT department since Dr. Hunter's first available is next week some time. She said that she now has new health insurance. I transferred her to the appointment desk to give them her new information and to try and schedule an appointment.

## 2019-10-11 NOTE — TELEPHONE ENCOUNTER
----- Message from Radha Smith sent at 10/11/2019  8:51 AM CDT -----  Contact: self/[phone]  Would like to consult with nurse about scheduling an appointment for an ENT. Patient states she has been having virdigo for 2 weeks. Please call back at [phone], jayesh Lizarraga

## 2019-10-14 ENCOUNTER — TELEPHONE (OUTPATIENT)
Dept: OTOLARYNGOLOGY | Facility: CLINIC | Age: 39
End: 2019-10-14

## 2019-10-14 ENCOUNTER — OFFICE VISIT (OUTPATIENT)
Dept: OTOLARYNGOLOGY | Facility: CLINIC | Age: 39
End: 2019-10-14
Payer: OTHER GOVERNMENT

## 2019-10-14 VITALS
DIASTOLIC BLOOD PRESSURE: 85 MMHG | HEART RATE: 85 BPM | BODY MASS INDEX: 34.33 KG/M2 | TEMPERATURE: 99 F | SYSTOLIC BLOOD PRESSURE: 125 MMHG | WEIGHT: 193.81 LBS

## 2019-10-14 DIAGNOSIS — R42 DIZZINESS: Primary | ICD-10-CM

## 2019-10-14 DIAGNOSIS — R42 VERTIGO: ICD-10-CM

## 2019-10-14 DIAGNOSIS — I10 BENIGN ESSENTIAL HYPERTENSION: ICD-10-CM

## 2019-10-14 PROCEDURE — 99214 OFFICE O/P EST MOD 30 MIN: CPT | Mod: S$GLB,,, | Performed by: PHYSICIAN ASSISTANT

## 2019-10-14 PROCEDURE — 99999 PR PBB SHADOW E&M-EST. PATIENT-LVL III: CPT | Mod: PBBFAC,,, | Performed by: PHYSICIAN ASSISTANT

## 2019-10-14 PROCEDURE — 99214 PR OFFICE/OUTPT VISIT, EST, LEVL IV, 30-39 MIN: ICD-10-PCS | Mod: S$GLB,,, | Performed by: PHYSICIAN ASSISTANT

## 2019-10-14 PROCEDURE — 99999 PR PBB SHADOW E&M-EST. PATIENT-LVL III: ICD-10-PCS | Mod: PBBFAC,,, | Performed by: PHYSICIAN ASSISTANT

## 2019-10-14 RX ORDER — ONDANSETRON 8 MG/1
8 TABLET, ORALLY DISINTEGRATING ORAL EVERY 6 HOURS PRN
Qty: 20 TABLET | Refills: 0 | Status: SHIPPED | OUTPATIENT
Start: 2019-10-14 | End: 2019-11-03

## 2019-10-14 RX ORDER — MECLIZINE HYDROCHLORIDE 25 MG/1
25 TABLET ORAL 3 TIMES DAILY PRN
Qty: 40 TABLET | Refills: 0 | Status: SHIPPED | OUTPATIENT
Start: 2019-10-14 | End: 2020-06-24 | Stop reason: SDUPTHER

## 2019-10-14 RX ORDER — TRIAMTERENE/HYDROCHLOROTHIAZID 37.5-25 MG
1 TABLET ORAL DAILY
Qty: 90 TABLET | Refills: 3 | Status: SHIPPED | OUTPATIENT
Start: 2019-10-14 | End: 2020-06-24 | Stop reason: SDUPTHER

## 2019-10-14 NOTE — PROGRESS NOTES
Subjective:       Patient ID: aPty Parham is a 39 y.o. female.    Chief Complaint: Vertigo (Since September 27th)    Patient is a very pleasant 39 y.o. female here to see me today for the first time for evaluation of dizziness.   She reports that the symptoms have been present for the last since 2012 when she was diagnosed with Menieres disease on left side. She is managed with Maxide and Anitvert. She states that she has been doing well until about 2 weeks ago when she awoke with spinning and N/V. She has been intermittently symptomatic since. She has been treated In past by Dr. Weeks. She has been having left sided ear fullness but no decreased hearing.     Review of Systems   Constitutional: Negative for chills, fatigue, fever and unexpected weight change.   HENT: Negative for congestion, dental problem, ear discharge, ear pain, facial swelling, hearing loss, nosebleeds, postnasal drip, rhinorrhea, sinus pressure, sneezing, sore throat, tinnitus, trouble swallowing and voice change.    Eyes: Negative for redness, itching and visual disturbance.   Respiratory: Negative for cough, choking, shortness of breath and wheezing.    Cardiovascular: Negative for chest pain and palpitations.   Gastrointestinal: Positive for nausea and vomiting. Negative for abdominal pain.        No reflux.   Musculoskeletal: Negative for gait problem.   Skin: Negative for rash.   Neurological: Positive for dizziness. Negative for light-headedness and headaches.       Objective:      Physical Exam   Constitutional: She is oriented to person, place, and time. She appears well-developed and well-nourished. No distress.   HENT:   Head: Normocephalic and atraumatic.   Right Ear: Tympanic membrane, external ear and ear canal normal.   Left Ear: Tympanic membrane, external ear and ear canal normal.   Nose: Nose normal. No mucosal edema, rhinorrhea, nasal deformity or septal deviation. No epistaxis. Right sinus exhibits no maxillary  sinus tenderness and no frontal sinus tenderness. Left sinus exhibits no maxillary sinus tenderness and no frontal sinus tenderness.   Mouth/Throat: Uvula is midline, oropharynx is clear and moist and mucous membranes are normal. Mucous membranes are not pale and not dry. No dental caries. No oropharyngeal exudate or posterior oropharyngeal erythema.   Eyes: Pupils are equal, round, and reactive to light. Conjunctivae, EOM and lids are normal. Right eye exhibits no chemosis. Left eye exhibits no chemosis. Right conjunctiva is not injected. Left conjunctiva is not injected. No scleral icterus. Right eye exhibits normal extraocular motion and no nystagmus. Left eye exhibits normal extraocular motion and no nystagmus.   Neck: Trachea normal and phonation normal. No tracheal tenderness present. No tracheal deviation present. No thyroid mass and no thyromegaly present.   Cardiovascular: Intact distal pulses.   Pulmonary/Chest: Effort normal. No stridor. No respiratory distress.   Abdominal: She exhibits no distension.   Lymphadenopathy:        Head (right side): No submental, no submandibular, no preauricular, no posterior auricular and no occipital adenopathy present.        Head (left side): No submental, no submandibular, no preauricular, no posterior auricular and no occipital adenopathy present.     She has no cervical adenopathy.   Neurological: She is alert and oriented to person, place, and time. No cranial nerve deficit.   Skin: Skin is warm and dry. No rash noted. No erythema.   Psychiatric: She has a normal mood and affect. Her behavior is normal.       Assessment:       1. Dizziness    2. Vertigo    3. Benign essential hypertension        Plan:       Dizziness: I would like her fill out a record release form to send to Dr. Weeks's office. I do not want to repeat testing that she has already had. We have also discussed Hydrops diet. I have refilled her medications and  Added zofran for the nausea.  I will  have her RTC for recheck.

## 2019-10-14 NOTE — TELEPHONE ENCOUNTER
I faxed over a release of medial records for to Dr. Cedillo.  Phone: [phone]  Fax: [phone]    I received an e-mail confirmation that my fax was successfully sent.  
Street

## 2019-10-21 ENCOUNTER — PATIENT MESSAGE (OUTPATIENT)
Dept: ADMINISTRATIVE | Facility: OTHER | Age: 39
End: 2019-10-21

## 2019-10-22 DIAGNOSIS — Z13.79 GENETIC SCREENING: ICD-10-CM

## 2019-12-15 DIAGNOSIS — E03.8 HYPOTHYROIDISM DUE TO HASHIMOTO'S THYROIDITIS: Chronic | ICD-10-CM

## 2019-12-15 DIAGNOSIS — E06.3 HYPOTHYROIDISM DUE TO HASHIMOTO'S THYROIDITIS: Chronic | ICD-10-CM

## 2019-12-20 ENCOUNTER — LAB VISIT (OUTPATIENT)
Dept: LAB | Facility: HOSPITAL | Age: 39
End: 2019-12-20
Attending: FAMILY MEDICINE
Payer: OTHER GOVERNMENT

## 2019-12-20 ENCOUNTER — OFFICE VISIT (OUTPATIENT)
Dept: INTERNAL MEDICINE | Facility: CLINIC | Age: 39
End: 2019-12-20
Payer: OTHER GOVERNMENT

## 2019-12-20 VITALS
BODY MASS INDEX: 33.83 KG/M2 | SYSTOLIC BLOOD PRESSURE: 120 MMHG | HEART RATE: 97 BPM | HEIGHT: 63 IN | TEMPERATURE: 97 F | DIASTOLIC BLOOD PRESSURE: 82 MMHG | WEIGHT: 190.94 LBS | OXYGEN SATURATION: 96 %

## 2019-12-20 DIAGNOSIS — I10 BENIGN ESSENTIAL HYPERTENSION: ICD-10-CM

## 2019-12-20 DIAGNOSIS — D75.839 THROMBOCYTOSIS: ICD-10-CM

## 2019-12-20 DIAGNOSIS — E06.3 HYPOTHYROIDISM DUE TO HASHIMOTO'S THYROIDITIS: Chronic | ICD-10-CM

## 2019-12-20 DIAGNOSIS — E03.8 HYPOTHYROIDISM DUE TO HASHIMOTO'S THYROIDITIS: Chronic | ICD-10-CM

## 2019-12-20 DIAGNOSIS — F41.1 GENERALIZED ANXIETY DISORDER: Chronic | ICD-10-CM

## 2019-12-20 DIAGNOSIS — Z00.00 PREVENTATIVE HEALTH CARE: Primary | ICD-10-CM

## 2019-12-20 DIAGNOSIS — F33.41 RECURRENT MAJOR DEPRESSION IN PARTIAL REMISSION: Chronic | ICD-10-CM

## 2019-12-20 LAB
ALBUMIN SERPL BCP-MCNC: 3.4 G/DL (ref 3.5–5.2)
ALP SERPL-CCNC: 65 U/L (ref 55–135)
ALT SERPL W/O P-5'-P-CCNC: 17 U/L (ref 10–44)
ANION GAP SERPL CALC-SCNC: 10 MMOL/L (ref 8–16)
AST SERPL-CCNC: 24 U/L (ref 10–40)
BASOPHILS # BLD AUTO: 0.03 K/UL (ref 0–0.2)
BASOPHILS NFR BLD: 0.3 % (ref 0–1.9)
BILIRUB SERPL-MCNC: 0.2 MG/DL (ref 0.1–1)
BUN SERPL-MCNC: 14 MG/DL (ref 6–20)
CALCIUM SERPL-MCNC: 9.9 MG/DL (ref 8.7–10.5)
CHLORIDE SERPL-SCNC: 100 MMOL/L (ref 95–110)
CHOLEST SERPL-MCNC: 231 MG/DL (ref 120–199)
CHOLEST/HDLC SERPL: 5.3 {RATIO} (ref 2–5)
CO2 SERPL-SCNC: 29 MMOL/L (ref 23–29)
CREAT SERPL-MCNC: 1 MG/DL (ref 0.5–1.4)
DIFFERENTIAL METHOD: ABNORMAL
EOSINOPHIL # BLD AUTO: 0.2 K/UL (ref 0–0.5)
EOSINOPHIL NFR BLD: 2.5 % (ref 0–8)
ERYTHROCYTE [DISTWIDTH] IN BLOOD BY AUTOMATED COUNT: 12.8 % (ref 11.5–14.5)
EST. GFR  (AFRICAN AMERICAN): >60 ML/MIN/1.73 M^2
EST. GFR  (NON AFRICAN AMERICAN): >60 ML/MIN/1.73 M^2
GLUCOSE SERPL-MCNC: 96 MG/DL (ref 70–110)
HCT VFR BLD AUTO: 43.8 % (ref 37–48.5)
HDLC SERPL-MCNC: 44 MG/DL (ref 40–75)
HDLC SERPL: 19 % (ref 20–50)
HGB BLD-MCNC: 14.9 G/DL (ref 12–16)
IMM GRANULOCYTES # BLD AUTO: 0.02 K/UL (ref 0–0.04)
IMM GRANULOCYTES NFR BLD AUTO: 0.2 % (ref 0–0.5)
LDLC SERPL CALC-MCNC: 165.4 MG/DL (ref 63–159)
LYMPHOCYTES # BLD AUTO: 3.1 K/UL (ref 1–4.8)
LYMPHOCYTES NFR BLD: 35.2 % (ref 18–48)
MCH RBC QN AUTO: 30.8 PG (ref 27–31)
MCHC RBC AUTO-ENTMCNC: 34 G/DL (ref 32–36)
MCV RBC AUTO: 91 FL (ref 82–98)
MONOCYTES # BLD AUTO: 0.7 K/UL (ref 0.3–1)
MONOCYTES NFR BLD: 7.5 % (ref 4–15)
NEUTROPHILS # BLD AUTO: 4.9 K/UL (ref 1.8–7.7)
NEUTROPHILS NFR BLD: 54.5 % (ref 38–73)
NONHDLC SERPL-MCNC: 187 MG/DL
NRBC BLD-RTO: 0 /100 WBC
PLATELET # BLD AUTO: 478 K/UL (ref 150–350)
PMV BLD AUTO: 8.7 FL (ref 9.2–12.9)
POTASSIUM SERPL-SCNC: 3.2 MMOL/L (ref 3.5–5.1)
PROT SERPL-MCNC: 8.3 G/DL (ref 6–8.4)
RBC # BLD AUTO: 4.84 M/UL (ref 4–5.4)
SODIUM SERPL-SCNC: 139 MMOL/L (ref 136–145)
TRIGL SERPL-MCNC: 108 MG/DL (ref 30–150)
WBC # BLD AUTO: 8.9 K/UL (ref 3.9–12.7)

## 2019-12-20 PROCEDURE — 99999 PR PBB SHADOW E&M-EST. PATIENT-LVL III: CPT | Mod: PBBFAC,,, | Performed by: FAMILY MEDICINE

## 2019-12-20 PROCEDURE — 80053 COMPREHEN METABOLIC PANEL: CPT

## 2019-12-20 PROCEDURE — 85025 COMPLETE CBC W/AUTO DIFF WBC: CPT

## 2019-12-20 PROCEDURE — 84443 ASSAY THYROID STIM HORMONE: CPT

## 2019-12-20 PROCEDURE — 99999 PR PBB SHADOW E&M-EST. PATIENT-LVL III: ICD-10-PCS | Mod: PBBFAC,,, | Performed by: FAMILY MEDICINE

## 2019-12-20 PROCEDURE — 99395 PR PREVENTIVE VISIT,EST,18-39: ICD-10-PCS | Mod: S$GLB,,, | Performed by: FAMILY MEDICINE

## 2019-12-20 PROCEDURE — 80061 LIPID PANEL: CPT

## 2019-12-20 PROCEDURE — 99395 PREV VISIT EST AGE 18-39: CPT | Mod: S$GLB,,, | Performed by: FAMILY MEDICINE

## 2019-12-20 PROCEDURE — 36415 COLL VENOUS BLD VENIPUNCTURE: CPT

## 2019-12-20 RX ORDER — BUPROPION HYDROCHLORIDE 300 MG/1
300 TABLET ORAL DAILY
Qty: 90 TABLET | Refills: 4 | Status: SHIPPED | OUTPATIENT
Start: 2019-12-20 | End: 2019-12-20 | Stop reason: SDUPTHER

## 2019-12-20 RX ORDER — BUPROPION HYDROCHLORIDE 300 MG/1
300 TABLET ORAL DAILY
Qty: 90 TABLET | Refills: 4 | Status: SHIPPED | OUTPATIENT
Start: 2019-12-20 | End: 2020-06-24 | Stop reason: SDUPTHER

## 2019-12-20 RX ORDER — LEVOTHYROXINE SODIUM 50 UG/1
50 TABLET ORAL DAILY
Qty: 90 TABLET | Refills: 4 | Status: SHIPPED | OUTPATIENT
Start: 2019-12-20 | End: 2020-06-24 | Stop reason: SDUPTHER

## 2019-12-20 RX ORDER — LEVOTHYROXINE SODIUM 50 UG/1
50 TABLET ORAL DAILY
Qty: 90 TABLET | Refills: 4 | Status: SHIPPED | OUTPATIENT
Start: 2019-12-20 | End: 2019-12-20 | Stop reason: SDUPTHER

## 2019-12-20 RX ORDER — DIAZEPAM 2 MG/1
2 TABLET ORAL 3 TIMES DAILY PRN
Qty: 90 TABLET | Refills: 0 | Status: SHIPPED | OUTPATIENT
Start: 2019-12-20 | End: 2020-06-24 | Stop reason: SDUPTHER

## 2019-12-20 NOTE — LETTER
DATE:  2019   TO:  [facility]; [id number]; [place] [address]; Ph: [phone]  VIA Fax: [phone]  FROM: JOSSELIN Hunter MD   RE:  Prescriptions for Paty Parham,  1980    We experienced errors when we tried to e-prescribe these medicines to you. Please accept this fax as physician order for the following prescriptions.     DRUG: levothyroxine (SYNTHROID) 50 MCG tablet   SIG: Sig - Route: Take 1 tablet (50 mcg total) by mouth once daily.   DISENSE: a quantity of 90 tablet(s) with 4 additional refills     DRUG: buPROPion (WELLBUTRIN XL) 300 MG 24 hr tablet   SIG: Take 1 tablet (300 mg total) by mouth once daily.   DISPENSE: a quantity of 90 tablet(s) with 4 additional refills    Sincerely,    JOSSELIN Hunter MD    CONFIDENTIALITY NOTICE: The accompanying facsimile is intended solely for the use of the recipient designated above. Document(s) transmitted herewith may contain information that is confidential and privileged. Delivery, distribution or dissemination of this communication other than to the intended recipient is strictly prohibited. If you have received this facsimile in error, please notify [facility]'s Compliance and Privacy Department immediately by telephone at [phone].

## 2019-12-21 LAB — TSH SERPL DL<=0.005 MIU/L-ACNC: 3.17 UIU/ML (ref 0.4–4)

## 2019-12-23 ENCOUNTER — PATIENT MESSAGE (OUTPATIENT)
Dept: INTERNAL MEDICINE | Facility: CLINIC | Age: 39
End: 2019-12-23

## 2019-12-24 RX ORDER — LEVOTHYROXINE SODIUM 50 UG/1
TABLET ORAL
Qty: 90 TABLET | Refills: 0 | OUTPATIENT
Start: 2019-12-24

## 2019-12-24 NOTE — TELEPHONE ENCOUNTER
REFILL REFUSED. REASON: The source prescription was discontinued on 12/20/2019 by JOSSELIN Hunter MD for the following reason: Reorder.      Requested Prescriptions     Refused Prescriptions Disp Refills    levothyroxine (SYNTHROID) 50 MCG tablet [Pharmacy Med Name: LEVOTHYROXINE 0.05MG (50MCG) TAB] 90 tablet 0     Sig: TAKE 1 TABLET BY MOUTH EVERY DAY     Refused By: HA HUNTER     Reason for Refusal: Request already responded to by other means (e.g. phone or fax)

## 2019-12-26 ENCOUNTER — PATIENT MESSAGE (OUTPATIENT)
Dept: INTERNAL MEDICINE | Facility: CLINIC | Age: 39
End: 2019-12-26

## 2019-12-27 NOTE — PROGRESS NOTES
"Paty Duque.    Your recent lab results were all normal or at least acceptable EXCEPT that your potassium was mildly low and your cholesterol was high.    Low potassium is a common side effect of triamterene-hydrochlorothiazide. To treat this, I recommend taking over-the-counter [employer] brand Potassium Gluconate 550 mg daily. (Nature Made is a brand of supplements that I have found trustworthy and reasonably priced. You can read more about their vitamin supplements at www.[employer].com.) You can also work on getting more potassium in your diet. Foods high in potassium include white beans, potatoes and sweet potatoes, beets, spinach, tomato sauce, oranges and orange juice, bananas, avocados, yogurt, and salmon.     Your lipid (cholesterol) panel shows that your cholesterol levels are NOT GREAT, but not bad enough to start/change a medicine at this point. For now, I recommend working on a heart-healthy lifestyle. You can learn more about a heart-healthy lifestyle at the website of the American Heart Association, www.heart.org.    Want to learn more about your test results and what they mean? It's as simple as 1, 2, 3.     (1) Log in to your [facility] account at [email or web address]     (2) From the "View test results" tab, click on the test you want to know more about.     (3) Click on the "About This Test" link.    If you have any additional questions about your test results, be sure to write them down and we can discuss them face-to-face at your next appointment.  If you have any urgent questions in the meantime, please send me a message using [facility], or you can call my office at [phone].    Take care, and be well.    -LM"

## 2019-12-27 NOTE — TELEPHONE ENCOUNTER
"Paty Duque.    Your recent lab results were all normal or at least acceptable EXCEPT that your potassium was mildly low and your cholesterol was high.    Low potassium is a common side effect of triamterene-hydrochlorothiazide. To treat this, I recommend taking over-the-counter [employer] Made brand Potassium Gluconate 550 mg daily. (Nature Made is a brand of supplements that I have found trustworthy and reasonably priced. You can read more about their vitamin supplements at www.[employer].com.) You can also work on getting more potassium in your diet. Foods high in potassium include white beans, potatoes and sweet potatoes, beets, spinach, tomato sauce, oranges and orange juice, bananas, avocados, yogurt, and salmon.     Your lipid (cholesterol) panel shows that your cholesterol levels are NOT GREAT, but not bad enough to start/change a medicine at this point. For now, I recommend working on a heart-healthy lifestyle. You can learn more about a heart-healthy lifestyle at the website of the American Heart Association, www.heart.org.    Want to learn more about your test results and what they mean? It's as simple as 1, 2, 3.     (1) Log in to your [facility] account at [email or web address]     (2) From the "View test results" tab, click on the test you want to know more about.     (3) Click on the "About This Test" link.    If you have any additional questions about your test results, be sure to write them down and we can discuss them face-to-face at your next appointment.  If you have any urgent questions in the meantime, please send me a message using [facility], or you can call my office at [phone].    Take care, and be well.    -LM   "

## 2020-01-11 NOTE — PROGRESS NOTES
CHIEF COMPLAINT  Follow-up      HISTORY, ASSESSMENT, and PLAN    1. Preventative health care        ASSESSMENT: Health risks reviewed.    2. Hypothyroidism due to Hashimoto's thyroiditis        ASSESSMENT: Well controlled, stable.    3. Recurrent major depression in partial remission        ASSESSMENT: Well controlled, improved.    4. Generalized anxiety disorder        ASSESSMENT: Well controlled, improved.    5. Thrombocytosis        ASSESSMENT: Asymptomatic.  She is due for diagnostic tests to evaluate and monitor this problem.     6. Benign essential hypertension        ASSESSMENT: Well controlled.       Orders Placed This Encounter    CBC auto differential    TSH    Comprehensive metabolic panel    Lipid panel    diazePAM (VALIUM) 2 MG tablet    buPROPion (WELLBUTRIN XL) 300 MG 24 hr tablet    levothyroxine (SYNTHROID) 50 MCG tablet        Outpatient Medications Prior to Visit   Medication Sig Dispense Refill    meclizine (ANTIVERT) 25 mg tablet Take 1 tablet (25 mg total) by mouth 3 (three) times daily as needed. 40 tablet 0    norgestrel-ethinyl estradiol (LOW-OGESTREL, 28,) 0.3-30 mg-mcg per tablet Take 1 tablet by mouth once daily.      triamterene-hydrochlorothiazide 37.5-25 mg (MAXZIDE-25) 37.5-25 mg per tablet Take 1 tablet by mouth once daily. 90 tablet 3    buPROPion (WELLBUTRIN XL) 300 MG 24 hr tablet TAKE 1 TABLET BY MOUTH ONCE DAILY 90 tablet 3    diazePAM (VALIUM) 2 MG tablet Take 1 tablet (2 mg total) by mouth 3 (three) times daily as needed for Anxiety. 90 tablet 1    levothyroxine (SYNTHROID) 50 MCG tablet Take 1 tablet (50 mcg total) by mouth once daily. 90 tablet 3    naproxen (NAPROSYN) 500 MG tablet Take 1 tablet (500 mg total) by mouth 2 (two) times daily as needed (pain). 30 tablet 0     No facility-administered medications prior to visit.         Medications Ordered This Encounter   Medications    buPROPion (WELLBUTRIN XL) 300 MG 24 hr tablet     Sig: Take 1 tablet (300 mg  total) by mouth once daily.     Dispense:  90 tablet     Refill:  4          diazePAM (VALIUM) 2 MG tablet     Sig: Take 1 tablet (2 mg total) by mouth 3 (three) times daily as needed for Anxiety.     Dispense:  90 tablet     Refill:  0    levothyroxine (SYNTHROID) 50 MCG tablet     Sig: Take 1 tablet (50 mcg total) by mouth once daily.     Dispense:  90 tablet     Refill:  4              Medications Discontinued During This Encounter   Medication Reason    naproxen (NAPROSYN) 500 MG tablet Patient no longer taking    buPROPion (WELLBUTRIN XL) 300 MG 24 hr tablet Reorder    levothyroxine (SYNTHROID) 50 MCG tablet Reorder    diazePAM (VALIUM) 2 MG tablet Reorder    buPROPion (WELLBUTRIN XL) 300 MG 24 hr tablet Reorder    levothyroxine (SYNTHROID) 50 MCG tablet Reorder    buPROPion (WELLBUTRIN XL) 300 MG 24 hr tablet Reorder    levothyroxine (SYNTHROID) 50 MCG tablet Reorder    buPROPion (WELLBUTRIN XL) 300 MG 24 hr tablet Reorder    levothyroxine (SYNTHROID) 50 MCG tablet Reorder        Follow up in about 1 year (around 12/20/2020) for wellness and preventive services.    Problem List Items Addressed This Visit        Psychiatric    Recurrent major depression in partial remission (Chronic)    Relevant Medications    buPROPion (WELLBUTRIN XL) 300 MG 24 hr tablet    Generalized anxiety disorder (Chronic)    Relevant Medications    diazePAM (VALIUM) 2 MG tablet    buPROPion (WELLBUTRIN XL) 300 MG 24 hr tablet       Cardiac/Vascular    Benign essential hypertension    Relevant Orders    Comprehensive metabolic panel (Completed)    Lipid panel (Completed)       Oncology    Thrombocytosis    Relevant Orders    CBC auto differential (Completed)       Endocrine    Hypothyroidism due to Hashimoto's thyroiditis (Chronic)    Relevant Medications    levothyroxine (SYNTHROID) 50 MCG tablet    Other Relevant Orders    TSH (Completed)      Other Visit Diagnoses     Preventative health care    -  Primary           REVIEW  "OF SYSTEMS  CONSTITUTIONAL: No fever reported.  CARDIOVASCULAR: No angina reported.  PULMONARY: No hemoptysis reported.  PSYCHIATRIC: No mood instability reported.  NEUROLOGIC: No seizures reported.  ENDOCRINE: No polydipsia reported.  GASTROINTESTINAL: No blood in stool reported.  GENITOURINARY: No hematuria reported.  ENT: No acute hearing changes reported.  OPHTHALMOLOGIC: No acute vision changes reported.  HEMATOLOGIC: No bleeding problems reported.  MUSCULOSKELETAL: No recent injuries reported.  DERMATOLOGIC: No skin infection reported.  REMAINDER OF COMPLETE REVIEW OF SYSTEMS is negative or noncontributory to present illness except as noted herein.     PHYSICAL EXAM  Vitals:    12/20/19 1525   BP: 120/82   Pulse: 97   Temp: 96.7 °F (35.9 °C)   TempSrc: Tympanic   SpO2: 96%   Weight: 86.6 kg (190 lb 14.7 oz)   Height: 5' 3" (1.6 m)     CONSTITUTIONAL: Vital signs noted. No apparent distress. Does not appear acutely ill or septic. Appears adequately hydrated.  EYE: Sclerae anicteric. Lids and conjunctiva unremarkable.  ENT: External ENT unremarkable. Oropharynx moist.  NECK: Trachea midline. Thyroid nontender.  PULM: Lungs clear. Breathing unlabored.  CV: Auscultation reveals regular rate and rhythm without murmur, gallop or rub. No carotid bruit.  GI: Soft and nontender. Bowel sounds normal.  DERM: Skin warm and moist with normal turgor.  NEURO: There are no gross focal motor deficits or gross deficits of cranial nerves III-XII.  PSYCH: Alert and oriented x 3. Mood is grossly neutral. Affect appropriate. Judgment and insight not grossly compromised.  MSK:  Stance and gait are grossly normal.      Documentation entered by me for this encounter may have been done in part using speech-recognition technology. Although I have made an effort to ensure accuracy, "sound like" errors may exist and should be interpreted in context. -JOSSELIN Hunter MD.   "

## 2020-06-24 ENCOUNTER — LAB VISIT (OUTPATIENT)
Dept: LAB | Facility: HOSPITAL | Age: 40
End: 2020-06-24
Attending: FAMILY MEDICINE
Payer: OTHER GOVERNMENT

## 2020-06-24 ENCOUNTER — OFFICE VISIT (OUTPATIENT)
Dept: INTERNAL MEDICINE | Facility: CLINIC | Age: 40
End: 2020-06-24
Payer: OTHER GOVERNMENT

## 2020-06-24 DIAGNOSIS — E03.8 HYPOTHYROIDISM DUE TO HASHIMOTO'S THYROIDITIS: Primary | Chronic | ICD-10-CM

## 2020-06-24 DIAGNOSIS — E78.2 MODERATE MIXED HYPERLIPIDEMIA NOT REQUIRING STATIN THERAPY: Chronic | ICD-10-CM

## 2020-06-24 DIAGNOSIS — E87.6 HYPOKALEMIA: Primary | ICD-10-CM

## 2020-06-24 DIAGNOSIS — Z11.4 SCREENING FOR HIV WITHOUT PRESENCE OF RISK FACTORS: ICD-10-CM

## 2020-06-24 DIAGNOSIS — F41.1 GENERALIZED ANXIETY DISORDER: Chronic | ICD-10-CM

## 2020-06-24 DIAGNOSIS — E03.8 HYPOTHYROIDISM DUE TO HASHIMOTO'S THYROIDITIS: Chronic | ICD-10-CM

## 2020-06-24 DIAGNOSIS — F33.41 RECURRENT MAJOR DEPRESSION IN PARTIAL REMISSION: Chronic | ICD-10-CM

## 2020-06-24 DIAGNOSIS — I10 BENIGN ESSENTIAL HYPERTENSION: ICD-10-CM

## 2020-06-24 DIAGNOSIS — D32.9 MENINGIOMA: Chronic | ICD-10-CM

## 2020-06-24 DIAGNOSIS — E06.3 HYPOTHYROIDISM DUE TO HASHIMOTO'S THYROIDITIS: Primary | Chronic | ICD-10-CM

## 2020-06-24 DIAGNOSIS — E06.3 HYPOTHYROIDISM DUE TO HASHIMOTO'S THYROIDITIS: Chronic | ICD-10-CM

## 2020-06-24 DIAGNOSIS — D75.839 THROMBOCYTOSIS: ICD-10-CM

## 2020-06-24 DIAGNOSIS — R42 VERTIGO: Chronic | ICD-10-CM

## 2020-06-24 PROBLEM — K11.21 PAROTITIS, ACUTE: Status: RESOLVED | Noted: 2019-04-01 | Resolved: 2020-06-24

## 2020-06-24 PROBLEM — K82.8 BILIARY DYSKINESIA: Status: RESOLVED | Noted: 2017-12-21 | Resolved: 2020-06-24

## 2020-06-24 PROBLEM — R53.83 OTHER FATIGUE: Status: RESOLVED | Noted: 2019-04-01 | Resolved: 2020-06-24

## 2020-06-24 LAB
ERYTHROCYTE [DISTWIDTH] IN BLOOD BY AUTOMATED COUNT: 13.7 % (ref 11.5–14.5)
HCT VFR BLD AUTO: 45 % (ref 37–48.5)
HGB BLD-MCNC: 14.9 G/DL (ref 12–16)
MCH RBC QN AUTO: 31 PG (ref 27–31)
MCHC RBC AUTO-ENTMCNC: 33.1 G/DL (ref 32–36)
MCV RBC AUTO: 94 FL (ref 82–98)
PLATELET # BLD AUTO: 441 K/UL (ref 150–350)
PMV BLD AUTO: 9.4 FL (ref 9.2–12.9)
RBC # BLD AUTO: 4.81 M/UL (ref 4–5.4)
WBC # BLD AUTO: 8.47 K/UL (ref 3.9–12.7)

## 2020-06-24 PROCEDURE — 86703 HIV-1/HIV-2 1 RESULT ANTBDY: CPT

## 2020-06-24 PROCEDURE — 99214 PR OFFICE/OUTPT VISIT, EST, LEVL IV, 30-39 MIN: ICD-10-PCS | Mod: 95,,, | Performed by: FAMILY MEDICINE

## 2020-06-24 PROCEDURE — 80061 LIPID PANEL: CPT

## 2020-06-24 PROCEDURE — 84443 ASSAY THYROID STIM HORMONE: CPT

## 2020-06-24 PROCEDURE — 80048 BASIC METABOLIC PNL TOTAL CA: CPT

## 2020-06-24 PROCEDURE — 85027 COMPLETE CBC AUTOMATED: CPT

## 2020-06-24 PROCEDURE — 99214 OFFICE O/P EST MOD 30 MIN: CPT | Mod: 95,,, | Performed by: FAMILY MEDICINE

## 2020-06-24 PROCEDURE — 36415 COLL VENOUS BLD VENIPUNCTURE: CPT

## 2020-06-24 RX ORDER — TRIAMTERENE/HYDROCHLOROTHIAZID 37.5-25 MG
1 TABLET ORAL DAILY
Qty: 90 TABLET | Refills: 4 | Status: SHIPPED | OUTPATIENT
Start: 2020-06-24 | End: 2021-01-04 | Stop reason: SDUPTHER

## 2020-06-24 RX ORDER — BUPROPION HYDROCHLORIDE 300 MG/1
300 TABLET ORAL DAILY
Qty: 90 TABLET | Refills: 4 | Status: SHIPPED | OUTPATIENT
Start: 2020-06-24 | End: 2021-04-15 | Stop reason: SDUPTHER

## 2020-06-24 RX ORDER — LEVOTHYROXINE SODIUM 50 UG/1
50 TABLET ORAL DAILY
Qty: 90 TABLET | Refills: 4 | Status: SHIPPED | OUTPATIENT
Start: 2020-06-24 | End: 2021-03-11

## 2020-06-24 RX ORDER — MECLIZINE HYDROCHLORIDE 25 MG/1
25 TABLET ORAL 3 TIMES DAILY PRN
Qty: 90 TABLET | Refills: 1 | Status: SHIPPED | OUTPATIENT
Start: 2020-06-24 | End: 2022-08-19 | Stop reason: SDUPTHER

## 2020-06-24 RX ORDER — DIAZEPAM 2 MG/1
2 TABLET ORAL 3 TIMES DAILY PRN
Qty: 90 TABLET | Refills: 3 | Status: SHIPPED | OUTPATIENT
Start: 2020-06-24 | End: 2021-04-15 | Stop reason: SDUPTHER

## 2020-06-24 RX ORDER — TOPIRAMATE SPINKLE 25 MG/1
25 CAPSULE ORAL 2 TIMES DAILY
COMMUNITY
Start: 2020-03-05 | End: 2021-04-15

## 2020-06-24 NOTE — PROGRESS NOTES
TELEMEDICINE VIRTUAL VISIT  CHIEF COMPLAINT  Follow-up (multiple problems)      DIAGNOSES, HISTORY, ASSESSMENT, AND PLAN    1. Hypothyroidism due to Hashimoto's thyroiditis    2. Meningioma, left temporal region    3. Vertigo with ataxia    4. Benign essential hypertension    5. Generalized anxiety disorder    6. Recurrent major depression in partial remission    7. Thrombocytosis    8. Screening for HIV without presence of risk factors    9. Moderate mixed hyperlipidemia not requiring statin therapy        Problem List Items Addressed This Visit        4     Hypothyroidism due to Hashimoto's thyroiditis - Primary (Chronic)    Current Assessment & Plan     Lab Results   Component Value Date    TSH 3.173 12/20/2019    TSH 3.547 04/01/2019    TSH 1.247 11/29/2018   Compensated/controlled and stable.           Relevant Medications    levothyroxine (SYNTHROID) 50 MCG tablet    Other Relevant Orders    TSH    Benign essential hypertension    Current Assessment & Plan     Compensated/controlled and stable.         Relevant Medications    triamterene-hydrochlorothiazide 37.5-25 mg (MAXZIDE-25) 37.5-25 mg per tablet    Other Relevant Orders    Basic metabolic panel       5     Recurrent major depression in partial remission (Chronic)    Current Assessment & Plan     Well controlled, stable. She appears to be tolerating her current medications well and reports preceiving no adverse side-effects.         Relevant Medications    buPROPion (WELLBUTRIN XL) 300 MG 24 hr tablet    Generalized anxiety disorder (Chronic)    Current Assessment & Plan     Overall acceptably controlled, but worse since COVID pandemic.  Diazepam helps, which she uses infrequently.  She says that she has been out for the last few weeks. She is demonstrating no behaviors to suggest inappropriate use of prescribed medications. [place] Board of Pharmacy Controlled Prescription Drug Monitoring database was queried and showed no activity to suggest abuse,  diversion, or other inappropriate use of prescription medications.         Relevant Medications    diazePAM (VALIUM) 2 MG tablet    buPROPion (WELLBUTRIN XL) 300 MG 24 hr tablet    Vertigo with ataxia (Chronic)    Overview     NEUROLOGIST: Dr. Satya Carias         Current Assessment & Plan     Resolved after starting Topamax.         Relevant Medications    meclizine (ANTIVERT) 25 mg tablet    Moderate mixed hyperlipidemia not requiring statin therapy (Chronic)    Current Assessment & Plan     Lab Results   Component Value Date    CHOL 231 (H) 12/20/2019    TRIG 108 12/20/2019    HDL 44 12/20/2019    LDLCALC 165.4 (H) 12/20/2019    NONHDLCHOL 187 12/20/2019    AST 24 12/20/2019    ALT 17 12/20/2019   Therapeutic lifestyle changes encouraged.          Relevant Orders    Lipid Panel    Thrombocytosis    Current Assessment & Plan     Asymptomatic.  She is due for diagnostic tests to evaluate and monitor this problem.         Relevant Orders    CBC Without Differential       6     Meningioma, left temporal region (Chronic)    Overview     NEUROSURGEON: Dr. Zhu  NEUROLOGIST: Dr. Satya Carias         Current Assessment & Plan     Benign. Not felt to be causing symptoms. PLAN: Watchful waiting.           Other Visit Diagnoses     Screening for HIV without presence of risk factors        Relevant Orders    HIV 1/2 Ag/Ab (4th Gen)           MEDICATION MANAGMENT    Outpatient Medications Prior to Visit   Medication Sig Dispense Refill    topiramate (TOPAMAX) 25 mg capsule Take 25 mg by mouth 2 (two) times daily.      norgestrel-ethinyl estradiol (LOW-OGESTREL, 28,) 0.3-30 mg-mcg per tablet Take 1 tablet by mouth once daily.      buPROPion (WELLBUTRIN XL) 300 MG 24 hr tablet Take 1 tablet (300 mg total) by mouth once daily. 90 tablet 4    diazePAM (VALIUM) 2 MG tablet Take 1 tablet (2 mg total) by mouth 3 (three) times daily as needed for Anxiety. 90 tablet 0    levothyroxine (SYNTHROID) 50 MCG tablet Take 1 tablet  (50 mcg total) by mouth once daily. 90 tablet 4    meclizine (ANTIVERT) 25 mg tablet Take 1 tablet (25 mg total) by mouth 3 (three) times daily as needed. 40 tablet 0    triamterene-hydrochlorothiazide 37.5-25 mg (MAXZIDE-25) 37.5-25 mg per tablet Take 1 tablet by mouth once daily. 90 tablet 3     No facility-administered medications prior to visit.         Medications Discontinued During This Encounter   Medication Reason    meclizine (ANTIVERT) 25 mg tablet Reorder    triamterene-hydrochlorothiazide 37.5-25 mg (MAXZIDE-25) 37.5-25 mg per tablet Reorder    diazePAM (VALIUM) 2 MG tablet Reorder    buPROPion (WELLBUTRIN XL) 300 MG 24 hr tablet Reorder    levothyroxine (SYNTHROID) 50 MCG tablet Reorder        Medications Ordered This Encounter   Medications    buPROPion (WELLBUTRIN XL) 300 MG 24 hr tablet     Sig: Take 1 tablet (300 mg total) by mouth once daily.     Dispense:  90 tablet     Refill:  4     This REPLACES any prior prescription for this drug. DISCONTINUE any prior prescription for this drug.    diazePAM (VALIUM) 2 MG tablet     Sig: Take 1 tablet (2 mg total) by mouth 3 (three) times daily as needed for Anxiety.     Dispense:  90 tablet     Refill:  3    levothyroxine (SYNTHROID) 50 MCG tablet     Sig: Take 1 tablet (50 mcg total) by mouth once daily.     Dispense:  90 tablet     Refill:  4     This REPLACES any prior prescription for this drug. DISCONTINUE any prior prescription for this drug.    meclizine (ANTIVERT) 25 mg tablet     Sig: Take 1 tablet (25 mg total) by mouth 3 (three) times daily as needed for Dizziness.     Dispense:  90 tablet     Refill:  1    triamterene-hydrochlorothiazide 37.5-25 mg (MAXZIDE-25) 37.5-25 mg per tablet     Sig: Take 1 tablet by mouth once daily.     Dispense:  90 tablet     Refill:  4     This REPLACES any prior prescription for this drug. DISCONTINUE any prior prescription for this drug.         FOLLOW-UP  Follow up in about 1 year (around 6/24/2021)  "for periodic management of chronic medical conditions, any new complaints or concerns.     REVIEW OF SYSTEMS  Answers for HPI/ROS submitted by the patient on 6/18/2020   activity change: No  unexpected weight change: No  neck pain: No  hearing loss: No  rhinorrhea: No  trouble swallowing: No  eye discharge: No  visual disturbance: No  chest tightness: No  wheezing: No  chest pain: No  palpitations: No  blood in stool: No  constipation: No  vomiting: No  diarrhea: No  polydipsia: No  polyuria: No  difficulty urinating: No  hematuria: No  menstrual problem: No  dysuria: No  joint swelling: No  arthralgias: No  headaches: No  weakness: No  confusion: No  dysphoric mood: No      PHYSICAL EXAM  CONSTITUTIONAL: No apparent distress. Appears comfortable. Does not appear acutely ill or septic. Appears adequately hydrated.  CARDIOVASCULAR: No perioral cyanosis.  PULMONARY: Breathing unlabored. No audible wheezes. Easily speaks in full sentences.  PSYCHIATRIC: Alert and conversant and grossly oriented. Mood is grossly neutral. Affect appropriate. Judgment and insight grossly intact.    Documentation entered by me for this encounter may have been done in part using speech-recognition technology. Although I have made an effort to ensure accuracy, "sound like" errors may exist and should be interpreted in context. -JOSSELIN Hunter MD.    Visit Details: This visit was a telemedicine virtual visit with synchronous audio and video. Paty reported that her location at the time of this visit was in the [facility]. Paty chose and consented to receive these medical services by telemedicine. TOTAL TIME evaluating and managing this patient for this encounter exceeded 25 minutes, the majority spent counseling and coordinating care for the listed diagnoses.      "

## 2020-06-24 NOTE — ASSESSMENT & PLAN NOTE
Lab Results   Component Value Date    TSH 3.173 12/20/2019    TSH 3.547 04/01/2019    TSH 1.247 11/29/2018   Compensated/controlled and stable.

## 2020-06-24 NOTE — ASSESSMENT & PLAN NOTE
Well controlled, stable. She appears to be tolerating her current medications well and reports preceiving no adverse side-effects.

## 2020-06-24 NOTE — ASSESSMENT & PLAN NOTE
Lab Results   Component Value Date    CHOL 231 (H) 12/20/2019    TRIG 108 12/20/2019    HDL 44 12/20/2019    LDLCALC 165.4 (H) 12/20/2019    NONHDLCHOL 187 12/20/2019    AST 24 12/20/2019    ALT 17 12/20/2019   Therapeutic lifestyle changes encouraged.

## 2020-06-24 NOTE — ASSESSMENT & PLAN NOTE
Overall acceptably controlled, but worse since COVID pandemic.  Diazepam helps, which she uses infrequently.  She says that she has been out for the last few weeks. She is demonstrating no behaviors to suggest inappropriate use of prescribed medications. [place] Board of Pharmacy Controlled Prescription Drug Monitoring database was queried and showed no activity to suggest abuse, diversion, or other inappropriate use of prescription medications.

## 2020-06-25 LAB
ANION GAP SERPL CALC-SCNC: 11 MMOL/L (ref 8–16)
BUN SERPL-MCNC: 14 MG/DL (ref 6–20)
CALCIUM SERPL-MCNC: 9.7 MG/DL (ref 8.7–10.5)
CHLORIDE SERPL-SCNC: 98 MMOL/L (ref 95–110)
CHOLEST SERPL-MCNC: 236 MG/DL (ref 120–199)
CHOLEST/HDLC SERPL: 4.4 {RATIO} (ref 2–5)
CO2 SERPL-SCNC: 26 MMOL/L (ref 23–29)
CREAT SERPL-MCNC: 1.1 MG/DL (ref 0.5–1.4)
EST. GFR  (AFRICAN AMERICAN): >60 ML/MIN/1.73 M^2
EST. GFR  (NON AFRICAN AMERICAN): >60 ML/MIN/1.73 M^2
GLUCOSE SERPL-MCNC: 155 MG/DL (ref 70–110)
HDLC SERPL-MCNC: 54 MG/DL (ref 40–75)
HDLC SERPL: 22.9 % (ref 20–50)
HIV 1+2 AB+HIV1 P24 AG SERPL QL IA: NEGATIVE
LDLC SERPL CALC-MCNC: 154 MG/DL (ref 63–159)
NONHDLC SERPL-MCNC: 182 MG/DL
POTASSIUM SERPL-SCNC: 2.9 MMOL/L (ref 3.5–5.1)
SODIUM SERPL-SCNC: 135 MMOL/L (ref 136–145)
TRIGL SERPL-MCNC: 140 MG/DL (ref 30–150)
TSH SERPL DL<=0.005 MIU/L-ACNC: 2.04 UIU/ML (ref 0.4–4)

## 2020-06-28 RX ORDER — POTASSIUM CHLORIDE 20 MEQ/1
20 TABLET, EXTENDED RELEASE ORAL 2 TIMES DAILY
Qty: 180 TABLET | Refills: 4 | Status: SHIPPED | OUTPATIENT
Start: 2020-06-28 | End: 2022-04-11

## 2020-06-28 NOTE — PROGRESS NOTES
Please verify she received/understood Patient Portal Message (below) and schedule her potassium lab in 1 month. Thanks.    --------------------------------  Paty Duque.    Im happy to report that these test results are fine except that your potassium is low. This is a common side-effect of Dyazide (triamterene-hydrochlorothiazide).    I've sent you a prescription for a potassium supplement and I've ordered a repeat potassium level for you in 1 month.    I also encourage you to try to eat more foods high in potassium include white beans, potatoes and sweet potatoes, beets, spinach, tomato sauce, oranges and orange juice, bananas, avocados, yogurt, and salmon.    Thanks for letting me care for you, thanks for trusting us with your healthcare needs, and thanks for using [facility].    Sincerely,    JOSSELIN Hunter MD  --------------------------------

## 2020-07-27 ENCOUNTER — LAB VISIT (OUTPATIENT)
Dept: LAB | Facility: HOSPITAL | Age: 40
End: 2020-07-27
Attending: FAMILY MEDICINE
Payer: OTHER GOVERNMENT

## 2020-07-27 DIAGNOSIS — E87.6 HYPOKALEMIA: ICD-10-CM

## 2020-07-27 LAB — POTASSIUM SERPL-SCNC: 3.3 MMOL/L (ref 3.5–5.1)

## 2020-07-27 PROCEDURE — 36415 COLL VENOUS BLD VENIPUNCTURE: CPT

## 2020-07-27 PROCEDURE — 84132 ASSAY OF SERUM POTASSIUM: CPT

## 2020-07-29 ENCOUNTER — PATIENT MESSAGE (OUTPATIENT)
Dept: INTERNAL MEDICINE | Facility: CLINIC | Age: 40
End: 2020-07-29

## 2020-07-29 DIAGNOSIS — E87.6 DRUG-INDUCED HYPOKALEMIA: Chronic | ICD-10-CM

## 2020-07-29 DIAGNOSIS — T50.905A DRUG-INDUCED HYPOKALEMIA: Chronic | ICD-10-CM

## 2020-07-29 NOTE — TELEPHONE ENCOUNTER
Paty Duque.    Your potassium level is much better, but still mildly low.    We can bump up your potassium dose, or you could try taking just one-half tablet of your triamterene-hydrochlorothiazide 37.5-25 mg (MAXZIDE) and seeing how your Meniere's does.    Either way, we'll need to re-check your potassium level in 3 months. I've already put that order in.      Please take the time right now to schedule your lab in early November. You can schedule appointments from your [facility] account or from the [email or web address] angela on your smartphone or by calling our appointment desk at [phone]. If you have any difficulty, send my staff a message, and they will be glad to help.     Let me know if you want to go the route of increasing your potassium dose, and I'll send you a new prescription.    Take care and be well.    Sincerely,    JOSSELIN Hunter MD      Orders Placed This Encounter   Procedures    Potassium

## 2020-07-31 DIAGNOSIS — Z12.39 BREAST CANCER SCREENING: ICD-10-CM

## 2020-10-06 ENCOUNTER — PATIENT MESSAGE (OUTPATIENT)
Dept: ADMINISTRATIVE | Facility: HOSPITAL | Age: 40
End: 2020-10-06

## 2020-11-09 ENCOUNTER — LAB VISIT (OUTPATIENT)
Dept: LAB | Facility: HOSPITAL | Age: 40
End: 2020-11-09
Attending: FAMILY MEDICINE
Payer: OTHER GOVERNMENT

## 2020-11-09 DIAGNOSIS — T50.905A DRUG-INDUCED HYPOKALEMIA: Chronic | ICD-10-CM

## 2020-11-09 DIAGNOSIS — E87.6 DRUG-INDUCED HYPOKALEMIA: Chronic | ICD-10-CM

## 2020-11-09 PROCEDURE — 36415 COLL VENOUS BLD VENIPUNCTURE: CPT

## 2020-11-09 PROCEDURE — 84132 ASSAY OF SERUM POTASSIUM: CPT

## 2020-11-10 LAB — POTASSIUM SERPL-SCNC: 3.9 MMOL/L (ref 3.5–5.1)

## 2020-11-17 NOTE — PROGRESS NOTES
"Paty Duque.    I'm happy to report that these results are fine and do not require a change in treatment.    Thanks for letting me care for you, thanks for trusting us with your healthcare needs, and thanks for using [facility].    Sincerely,    JOSSELIN Hunter MD  --------------------------------------------------------------------------------   Want to learn more about your test results and what they mean?  (1) Log in to your [facility] account at [email or web address].  (2) From the "View test results" tab, click on the test you want to know more about.  (3) Click on the "About This Test" link."

## 2020-12-02 ENCOUNTER — PATIENT MESSAGE (OUTPATIENT)
Dept: INTERNAL MEDICINE | Facility: CLINIC | Age: 40
End: 2020-12-02

## 2020-12-02 ENCOUNTER — NURSE TRIAGE (OUTPATIENT)
Dept: ADMINISTRATIVE | Facility: CLINIC | Age: 40
End: 2020-12-02

## 2020-12-02 NOTE — TELEPHONE ENCOUNTER
Additional Information   Negative: [1] COVID-19 infection suspected by caller or triager AND [2] mild symptoms (cough, fever, or others) AND [3] no complications or SOB   Negative: Fever present > 3 days (72 hours)   Negative: [1] Fever returns after gone for over 24 hours AND [2] symptoms worse or not improved   Negative: [1] Continuous (nonstop) coughing interferes with work or school AND [2] no improvement using cough treatment per protocol   Negative: Cough present > 3 weeks    Protocols used: CORONAVIRUS (COVID-19) DIAGNOSED OR [id number]

## 2020-12-02 NOTE — TELEPHONE ENCOUNTER
O[facility] RN Patient calling about covid.  Patient feels like she is having covid symptoms right now and would like a test.  Cough minimal,  Non prodcutive,  Treatment of fever 101.  Wanted testing information. Offered her appt with pcp,  Declined it,   Gave her [facility] testing sites, and [facility] testing sites, or [facility]  testing.   Care advise is home care.  Went over protocol and patient verbalized understanding.       Reason for Disposition   COVID-19 Testing, questions about    Additional Information   Negative: Severe difficulty breathing (e.g., struggling for each breath, speaks in single words)   Negative: Difficult to awaken or acting confused (e.g., disoriented, slurred speech)   Negative: Bluish (or gray) lips or face now   Negative: Shock suspected (e.g., cold/pale/clammy skin, too weak to stand, low BP, rapid pulse)   Negative: Sounds like a life-threatening emergency to the triager   Negative: SEVERE or constant chest pain or pressure (Exception: mild central chest pain, present only when coughing)   Negative: MODERATE difficulty breathing (e.g., speaks in phrases, SOB even at rest, pulse 100-120)   Negative: MILD difficulty breathing (e.g., minimal/no SOB at rest, SOB with walking, pulse <100)   Negative: Chest pain   Negative: Patient sounds very sick or weak to the triager   Negative: Fever > 103 F (39.4 C)   Negative: [1] Fever > 101 F (38.3 C) AND [2] age > 60   Negative: [1] Fever > 100.0 F (37.8 C) AND [2] bedridden (e.g., nursing home patient, CVA, chronic illness, recovering from surgery)   Negative: HIGH RISK patient (e.g., age > 64 years, diabetes, heart or lung disease, weak immune system) (Exception: has already been evaluated by healthcare provider and has no new or worsening symptoms)   Negative: [1] COVID-19 diagnosed by positive lab test AND [2] mild symptoms (e.g., cough, fever, others) AND [3] no complications or SOB   Negative: [1] COVID-19 diagnosed by HCP  (doctor, NP or PA) AND [2] mild symptoms (e.g., cough, fever, others) AND [3] no complications or SOB   Negative: COVID-19 Home Isolation, questions about    Protocols used: CORONAVIRUS (COVID-19) DIAGNOSED OR [id number]

## 2021-01-01 NOTE — TELEPHONE ENCOUNTER
----- Message from Marcie Box sent at 7/10/2017  3:11 PM CDT -----  States she was seen in the urgent care for an ear infection and would like to speak to nurse. Please adv/call [phone].//thanks. cw  
S/w pt stating she was seen in urgent care for ear infection and they wanted her to f/u in 2 days to evaluate for possible mastoiditis, rx'd her antibiotic, and c/o persistent HA with ear pain. Sched pt for appt w/ Dr. Hunter tomorrow 07/11/17 @ 2:40pm, pt conf appt date/time, advised pt to bring rx bottles with her for appt and to continue meds as they prescribed her, she voiced  understanding.  
Passed

## 2021-01-04 ENCOUNTER — TELEPHONE (OUTPATIENT)
Dept: OTOLARYNGOLOGY | Facility: CLINIC | Age: 41
End: 2021-01-04

## 2021-01-04 DIAGNOSIS — I10 BENIGN ESSENTIAL HYPERTENSION: ICD-10-CM

## 2021-01-04 RX ORDER — TRIAMTERENE/HYDROCHLOROTHIAZID 37.5-25 MG
1 TABLET ORAL DAILY
Qty: 90 TABLET | Refills: 4 | Status: SHIPPED | OUTPATIENT
Start: 2021-01-04 | End: 2021-04-15 | Stop reason: ALTCHOICE

## 2021-03-10 ENCOUNTER — PATIENT OUTREACH (OUTPATIENT)
Dept: ADMINISTRATIVE | Facility: HOSPITAL | Age: 41
End: 2021-03-10

## 2021-03-11 DIAGNOSIS — E06.3 HYPOTHYROIDISM DUE TO HASHIMOTO'S THYROIDITIS: Chronic | ICD-10-CM

## 2021-03-11 DIAGNOSIS — E03.8 HYPOTHYROIDISM DUE TO HASHIMOTO'S THYROIDITIS: Chronic | ICD-10-CM

## 2021-03-11 RX ORDER — LEVOTHYROXINE SODIUM 50 UG/1
TABLET ORAL
Qty: 90 TABLET | Refills: 4 | Status: SHIPPED | OUTPATIENT
Start: 2021-03-11 | End: 2021-05-18 | Stop reason: SDUPTHER

## 2021-04-15 ENCOUNTER — OFFICE VISIT (OUTPATIENT)
Dept: INTERNAL MEDICINE | Facility: CLINIC | Age: 41
End: 2021-04-15
Payer: OTHER GOVERNMENT

## 2021-04-15 ENCOUNTER — TELEPHONE (OUTPATIENT)
Dept: INTERNAL MEDICINE | Facility: CLINIC | Age: 41
End: 2021-04-15

## 2021-04-15 ENCOUNTER — LAB VISIT (OUTPATIENT)
Dept: LAB | Facility: HOSPITAL | Age: 41
End: 2021-04-15
Attending: FAMILY MEDICINE
Payer: OTHER GOVERNMENT

## 2021-04-15 VITALS
HEART RATE: 88 BPM | TEMPERATURE: 98 F | OXYGEN SATURATION: 97 % | SYSTOLIC BLOOD PRESSURE: 92 MMHG | HEIGHT: 63 IN | WEIGHT: 163.81 LBS | BODY MASS INDEX: 29.02 KG/M2 | DIASTOLIC BLOOD PRESSURE: 72 MMHG
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
## 2021-04-15 DIAGNOSIS — F33.41 RECURRENT MAJOR DEPRESSION IN PARTIAL REMISSION: Chronic | ICD-10-CM

## 2021-04-15 DIAGNOSIS — E06.3 HYPOTHYROIDISM DUE TO HASHIMOTO'S THYROIDITIS: Chronic | ICD-10-CM

## 2021-04-15 DIAGNOSIS — Z79.899 ENCOUNTER FOR LONG-TERM CURRENT USE OF MEDICATION: ICD-10-CM

## 2021-04-15 DIAGNOSIS — E06.3 HYPOTHYROIDISM DUE TO HASHIMOTO'S THYROIDITIS: ICD-10-CM

## 2021-04-15 DIAGNOSIS — E87.6 DRUG-INDUCED HYPOKALEMIA: Primary | ICD-10-CM

## 2021-04-15 DIAGNOSIS — E87.6 DRUG-INDUCED HYPOKALEMIA: ICD-10-CM

## 2021-04-15 DIAGNOSIS — R53.83 FATIGUE, UNSPECIFIED TYPE: ICD-10-CM

## 2021-04-15 DIAGNOSIS — F41.1 GENERALIZED ANXIETY DISORDER: Chronic | ICD-10-CM

## 2021-04-15 DIAGNOSIS — D75.839 THROMBOCYTOSIS: ICD-10-CM

## 2021-04-15 DIAGNOSIS — I10 BENIGN ESSENTIAL HYPERTENSION: ICD-10-CM

## 2021-04-15 DIAGNOSIS — R42 VERTIGO: Chronic | ICD-10-CM

## 2021-04-15 DIAGNOSIS — T50.905A DRUG-INDUCED HYPOKALEMIA: Chronic | ICD-10-CM

## 2021-04-15 DIAGNOSIS — R63.0 ANOREXIA: ICD-10-CM

## 2021-04-15 DIAGNOSIS — R63.4 WEIGHT LOSS: Primary | ICD-10-CM

## 2021-04-15 DIAGNOSIS — S41.152A DOG BITE OF LEFT UPPER EXTREMITY, INITIAL ENCOUNTER: ICD-10-CM

## 2021-04-15 DIAGNOSIS — T50.905A DRUG-INDUCED HYPOKALEMIA: Primary | ICD-10-CM

## 2021-04-15 DIAGNOSIS — I95.1 ORTHOSTATIC HYPOTENSION: ICD-10-CM

## 2021-04-15 DIAGNOSIS — E87.6 DRUG-INDUCED HYPOKALEMIA: Chronic | ICD-10-CM

## 2021-04-15 DIAGNOSIS — E03.8 HYPOTHYROIDISM DUE TO HASHIMOTO'S THYROIDITIS: Chronic | ICD-10-CM

## 2021-04-15 DIAGNOSIS — T50.905A DRUG-INDUCED HYPOKALEMIA: ICD-10-CM

## 2021-04-15 DIAGNOSIS — R68.81 EARLY SATIETY: ICD-10-CM

## 2021-04-15 DIAGNOSIS — E03.8 HYPOTHYROIDISM DUE TO HASHIMOTO'S THYROIDITIS: ICD-10-CM

## 2021-04-15 DIAGNOSIS — R10.11 RUQ ABDOMINAL PAIN: ICD-10-CM

## 2021-04-15 LAB
ALBUMIN SERPL BCP-MCNC: 3.9 G/DL (ref 3.5–5.2)
ALP SERPL-CCNC: 68 U/L (ref 55–135)
ALT SERPL W/O P-5'-P-CCNC: 21 U/L (ref 10–44)
ANION GAP SERPL CALC-SCNC: 11 MMOL/L (ref 8–16)
AST SERPL-CCNC: 23 U/L (ref 10–40)
BASOPHILS # BLD AUTO: 0.06 K/UL (ref 0–0.2)
BASOPHILS NFR BLD: 0.6 % (ref 0–1.9)
BILIRUB SERPL-MCNC: 0.3 MG/DL (ref 0.1–1)
BUN SERPL-MCNC: 14 MG/DL (ref 6–20)
CALCIUM SERPL-MCNC: 9.7 MG/DL (ref 8.7–10.5)
CHLORIDE SERPL-SCNC: 102 MMOL/L (ref 95–110)
CO2 SERPL-SCNC: 26 MMOL/L (ref 23–29)
CREAT SERPL-MCNC: 1.2 MG/DL (ref 0.5–1.4)
DIFFERENTIAL METHOD: ABNORMAL
EOSINOPHIL # BLD AUTO: 0.2 K/UL (ref 0–0.5)
EOSINOPHIL NFR BLD: 1.9 % (ref 0–8)
ERYTHROCYTE [DISTWIDTH] IN BLOOD BY AUTOMATED COUNT: 13 % (ref 11.5–14.5)
EST. GFR  (AFRICAN AMERICAN): >60 ML/MIN/1.73 M^2
EST. GFR  (NON AFRICAN AMERICAN): 57 ML/MIN/1.73 M^2
GLUCOSE SERPL-MCNC: 81 MG/DL (ref 70–110)
HCT VFR BLD AUTO: 46.4 % (ref 37–48.5)
HGB BLD-MCNC: 15.9 G/DL (ref 12–16)
IMM GRANULOCYTES # BLD AUTO: 0.03 K/UL (ref 0–0.04)
IMM GRANULOCYTES NFR BLD AUTO: 0.3 % (ref 0–0.5)
LYMPHOCYTES # BLD AUTO: 3.4 K/UL (ref 1–4.8)
LYMPHOCYTES NFR BLD: 32.8 % (ref 18–48)
MCH RBC QN AUTO: 30.8 PG (ref 27–31)
MCHC RBC AUTO-ENTMCNC: 34.3 G/DL (ref 32–36)
MCV RBC AUTO: 90 FL (ref 82–98)
MONOCYTES # BLD AUTO: 0.6 K/UL (ref 0.3–1)
MONOCYTES NFR BLD: 6.1 % (ref 4–15)
NEUTROPHILS # BLD AUTO: 6.1 K/UL (ref 1.8–7.7)
NEUTROPHILS NFR BLD: 58.3 % (ref 38–73)
NRBC BLD-RTO: 0 /100 WBC
PLATELET # BLD AUTO: 453 K/UL (ref 150–450)
PMV BLD AUTO: 8.9 FL (ref 9.2–12.9)
POTASSIUM SERPL-SCNC: 3 MMOL/L (ref 3.5–5.1)
PROT SERPL-MCNC: 8.4 G/DL (ref 6–8.4)
RBC # BLD AUTO: 5.16 M/UL (ref 4–5.4)
SODIUM SERPL-SCNC: 139 MMOL/L (ref 136–145)
WBC # BLD AUTO: 10.41 K/UL (ref 3.9–12.7)

## 2021-04-15 PROCEDURE — 80053 COMPREHEN METABOLIC PANEL: CPT | Performed by: FAMILY MEDICINE

## 2021-04-15 PROCEDURE — 99999 PR PBB SHADOW E&M-EST. PATIENT-LVL V: CPT | Mod: PBBFAC,,, | Performed by: FAMILY MEDICINE

## 2021-04-15 PROCEDURE — 99214 OFFICE O/P EST MOD 30 MIN: CPT | Mod: S$GLB,,, | Performed by: FAMILY MEDICINE

## 2021-04-15 PROCEDURE — 84443 ASSAY THYROID STIM HORMONE: CPT | Performed by: FAMILY MEDICINE

## 2021-04-15 PROCEDURE — 36415 COLL VENOUS BLD VENIPUNCTURE: CPT | Performed by: FAMILY MEDICINE

## 2021-04-15 PROCEDURE — 85025 COMPLETE CBC W/AUTO DIFF WBC: CPT | Performed by: FAMILY MEDICINE

## 2021-04-15 PROCEDURE — 99999 PR PBB SHADOW E&M-EST. PATIENT-LVL V: ICD-10-PCS | Mod: PBBFAC,,, | Performed by: FAMILY MEDICINE

## 2021-04-15 PROCEDURE — 99214 PR OFFICE/OUTPT VISIT, EST, LEVL IV, 30-39 MIN: ICD-10-PCS | Mod: S$GLB,,, | Performed by: FAMILY MEDICINE

## 2021-04-15 RX ORDER — TOPIRAMATE 50 MG/1
50 TABLET, FILM COATED ORAL 2 TIMES DAILY
COMMUNITY

## 2021-04-15 RX ORDER — BUPROPION HYDROCHLORIDE 300 MG/1
300 TABLET ORAL DAILY
Qty: 90 TABLET | Refills: 4 | Status: SHIPPED | OUTPATIENT
Start: 2021-04-15 | End: 2021-09-27

## 2021-04-15 RX ORDER — DIAZEPAM 2 MG/1
2 TABLET ORAL 3 TIMES DAILY PRN
Qty: 90 TABLET | Refills: 3 | Status: SHIPPED | OUTPATIENT
Start: 2021-04-15 | End: 2022-03-03 | Stop reason: ALTCHOICE

## 2021-04-15 RX ORDER — AMOXICILLIN AND CLAVULANATE POTASSIUM 875; 125 MG/1; MG/1
TABLET, FILM COATED ORAL
COMMUNITY
Start: 2021-04-11 | End: 2021-05-18

## 2021-04-15 RX ORDER — TRIAMTERENE CAPSULES 100 MG/1
CAPSULE ORAL
COMMUNITY
End: 2021-05-18

## 2021-04-16 ENCOUNTER — TELEPHONE (OUTPATIENT)
Dept: ENDOSCOPY | Facility: HOSPITAL | Age: 41
End: 2021-04-16

## 2021-04-16 ENCOUNTER — HOSPITAL ENCOUNTER (OUTPATIENT)
Dept: RADIOLOGY | Facility: HOSPITAL | Age: 41
Discharge: HOME | End: 2021-04-16
Attending: FAMILY MEDICINE
Payer: OTHER GOVERNMENT

## 2021-04-16 ENCOUNTER — PATIENT MESSAGE (OUTPATIENT)
Dept: ENDOSCOPY | Facility: HOSPITAL | Age: 41
End: 2021-04-16

## 2021-04-16 DIAGNOSIS — Z01.818 PRE-OP TESTING: Primary | ICD-10-CM

## 2021-04-16 DIAGNOSIS — R10.11 RUQ ABDOMINAL PAIN: ICD-10-CM

## 2021-04-16 LAB — TSH SERPL DL<=0.005 MIU/L-ACNC: 1.98 UIU/ML (ref 0.4–4)

## 2021-04-16 PROCEDURE — 76700 US EXAM ABDOM COMPLETE: CPT | Mod: 26,,, | Performed by: RADIOLOGY

## 2021-04-16 PROCEDURE — 76700 US EXAM ABDOM COMPLETE: CPT | Mod: TC

## 2021-04-16 PROCEDURE — 76700 US ABDOMEN COMPLETE: ICD-10-PCS | Mod: 26,,, | Performed by: RADIOLOGY

## 2021-04-26 ENCOUNTER — PATIENT MESSAGE (OUTPATIENT)
Dept: PREADMISSION TESTING | Facility: HOSPITAL | Age: 41
End: 2021-04-26

## 2021-04-27 ENCOUNTER — TELEPHONE (OUTPATIENT)
Dept: PREADMISSION TESTING | Facility: HOSPITAL | Age: 41
End: 2021-04-27

## 2021-04-29 ENCOUNTER — ANESTHESIA EVENT (OUTPATIENT)
Dept: ENDOSCOPY | Facility: HOSPITAL | Age: 41
End: 2021-04-29
Payer: OTHER GOVERNMENT

## 2021-04-30 ENCOUNTER — LAB VISIT (OUTPATIENT)
Dept: OTOLARYNGOLOGY | Facility: CLINIC | Age: 41
End: 2021-04-30
Payer: OTHER GOVERNMENT

## 2021-04-30 DIAGNOSIS — Z01.818 PRE-OP TESTING: ICD-10-CM

## 2021-04-30 PROCEDURE — U0003 INFECTIOUS AGENT DETECTION BY NUCLEIC ACID (DNA OR RNA); SEVERE ACUTE RESPIRATORY SYNDROME CORONAVIRUS 2 (SARS-COV-2) (CORONAVIRUS DISEASE [COVID-19]), AMPLIFIED PROBE TECHNIQUE, MAKING USE OF HIGH THROUGHPUT TECHNOLOGIES AS DESCRIBED BY CMS-2020-01-R: HCPCS | Performed by: INTERNAL MEDICINE

## 2021-04-30 PROCEDURE — U0005 INFEC AGEN DETEC AMPLI PROBE: HCPCS | Performed by: INTERNAL MEDICINE

## 2021-05-01 LAB — SARS-COV-2 RNA RESP QL NAA+PROBE: NOT DETECTED

## 2021-05-03 ENCOUNTER — ANESTHESIA (OUTPATIENT)
Dept: ENDOSCOPY | Facility: HOSPITAL | Age: 41
End: 2021-05-03
Payer: OTHER GOVERNMENT

## 2021-05-03 ENCOUNTER — HOSPITAL ENCOUNTER (OUTPATIENT)
Facility: HOSPITAL | Age: 41
Discharge: HOME | End: 2021-05-03
Attending: INTERNAL MEDICINE | Admitting: INTERNAL MEDICINE
Payer: OTHER GOVERNMENT

## 2021-05-03 VITALS
HEART RATE: 77 BPM | TEMPERATURE: 98 F | BODY MASS INDEX: 26.09 KG/M2 | HEIGHT: 63 IN | OXYGEN SATURATION: 100 % | SYSTOLIC BLOOD PRESSURE: 137 MMHG | DIASTOLIC BLOOD PRESSURE: 79 MMHG | RESPIRATION RATE: 18 BRPM | WEIGHT: 147.25 LBS

## 2021-05-03 DIAGNOSIS — R68.81 EARLY SATIETY: ICD-10-CM

## 2021-05-03 DIAGNOSIS — R10.11 RUQ ABDOMINAL PAIN: ICD-10-CM

## 2021-05-03 DIAGNOSIS — R63.4 WEIGHT LOSS: Primary | ICD-10-CM

## 2021-05-03 LAB
B-HCG UR QL: NEGATIVE
CTP QC/QA: YES

## 2021-05-03 PROCEDURE — 43251 EGD REMOVE LESION SNARE: CPT | Performed by: INTERNAL MEDICINE

## 2021-05-03 PROCEDURE — 43251 PR EGD, FLEX, W/REMOVAL, TUMOR/POLYP/LESION(S), SNARE: ICD-10-PCS | Mod: 51,,, | Performed by: INTERNAL MEDICINE

## 2021-05-03 PROCEDURE — 37000008 HC ANESTHESIA 1ST 15 MINUTES: Performed by: INTERNAL MEDICINE

## 2021-05-03 PROCEDURE — 63600175 PHARM REV CODE 636 W HCPCS: Performed by: INTERNAL MEDICINE

## 2021-05-03 PROCEDURE — 81025 URINE PREGNANCY TEST: CPT | Performed by: INTERNAL MEDICINE

## 2021-05-03 PROCEDURE — D9220A PRA ANESTHESIA: Mod: ,,, | Performed by: ANESTHESIOLOGY

## 2021-05-03 PROCEDURE — D9220A PRA ANESTHESIA: ICD-10-PCS | Mod: ,,, | Performed by: ANESTHESIOLOGY

## 2021-05-03 PROCEDURE — 63600175 PHARM REV CODE 636 W HCPCS: Performed by: NURSE ANESTHETIST, CERTIFIED REGISTERED

## 2021-05-03 PROCEDURE — 27201012 HC FORCEPS, HOT/COLD, DISP: Performed by: INTERNAL MEDICINE

## 2021-05-03 PROCEDURE — 88305 TISSUE EXAM BY PATHOLOGIST: ICD-10-PCS | Mod: 26,,, | Performed by: PATHOLOGY

## 2021-05-03 PROCEDURE — 27201089 HC SNARE, DISP (ANY): Performed by: INTERNAL MEDICINE

## 2021-05-03 PROCEDURE — 88305 TISSUE EXAM BY PATHOLOGIST: CPT | Mod: 59 | Performed by: PATHOLOGY

## 2021-05-03 PROCEDURE — 27201042 HC RETRIEVAL NET: Performed by: INTERNAL MEDICINE

## 2021-05-03 PROCEDURE — 25000003 PHARM REV CODE 250: Performed by: NURSE ANESTHETIST, CERTIFIED REGISTERED

## 2021-05-03 PROCEDURE — 88305 TISSUE EXAM BY PATHOLOGIST: CPT | Mod: 26,,, | Performed by: PATHOLOGY

## 2021-05-03 PROCEDURE — 43239 EGD BIOPSY SINGLE/MULTIPLE: CPT | Performed by: INTERNAL MEDICINE

## 2021-05-03 PROCEDURE — 37000009 HC ANESTHESIA EA ADD 15 MINS: Performed by: INTERNAL MEDICINE

## 2021-05-03 PROCEDURE — 43251 EGD REMOVE LESION SNARE: CPT | Mod: 51,,, | Performed by: INTERNAL MEDICINE

## 2021-05-03 RX ORDER — LIDOCAINE HYDROCHLORIDE 20 MG/ML
INJECTION, SOLUTION EPIDURAL; INFILTRATION; INTRACAUDAL; PERINEURAL
Status: DISCONTINUED | OUTPATIENT
Start: 2021-05-03 | End: 2021-05-03

## 2021-05-03 RX ORDER — PROPOFOL 10 MG/ML
INJECTION, EMULSION INTRAVENOUS
Status: DISCONTINUED | OUTPATIENT
Start: 2021-05-03 | End: 2021-05-03

## 2021-05-03 RX ORDER — FENTANYL CITRATE 50 UG/ML
INJECTION, SOLUTION INTRAMUSCULAR; INTRAVENOUS
Status: DISCONTINUED | OUTPATIENT
Start: 2021-05-03 | End: 2021-05-03

## 2021-05-03 RX ORDER — SODIUM CHLORIDE, SODIUM LACTATE, POTASSIUM CHLORIDE, CALCIUM CHLORIDE 600; 310; 30; 20 MG/100ML; MG/100ML; MG/100ML; MG/100ML
INJECTION, SOLUTION INTRAVENOUS CONTINUOUS
Status: DISCONTINUED | OUTPATIENT
Start: 2021-05-03 | End: 2021-05-03 | Stop reason: HOSPADM

## 2021-05-03 RX ADMIN — FENTANYL CITRATE 50 MCG: 50 INJECTION, SOLUTION INTRAMUSCULAR; INTRAVENOUS at 09:05

## 2021-05-03 RX ADMIN — PROPOFOL 50 MG: 10 INJECTION, EMULSION INTRAVENOUS at 10:05

## 2021-05-03 RX ADMIN — SODIUM CHLORIDE, SODIUM LACTATE, POTASSIUM CHLORIDE, AND CALCIUM CHLORIDE: .6; .31; .03; .02 INJECTION, SOLUTION INTRAVENOUS at 09:05

## 2021-05-03 RX ADMIN — LIDOCAINE HYDROCHLORIDE 50 MG: 20 INJECTION, SOLUTION EPIDURAL; INFILTRATION; INTRACAUDAL; PERINEURAL at 10:05

## 2021-05-04 ENCOUNTER — PATIENT MESSAGE (OUTPATIENT)
Dept: INTERNAL MEDICINE | Facility: CLINIC | Age: 41
End: 2021-05-04

## 2021-05-04 DIAGNOSIS — R10.84 GENERALIZED ABDOMINAL PAIN: ICD-10-CM

## 2021-05-04 DIAGNOSIS — R10.11 RUQ ABDOMINAL PAIN: Primary | ICD-10-CM

## 2021-05-04 DIAGNOSIS — R63.4 WEIGHT LOSS: ICD-10-CM

## 2021-05-05 ENCOUNTER — TELEPHONE (OUTPATIENT)
Dept: INTERNAL MEDICINE | Facility: CLINIC | Age: 41
End: 2021-05-05

## 2021-05-06 ENCOUNTER — LAB VISIT (OUTPATIENT)
Dept: LAB | Facility: HOSPITAL | Age: 41
End: 2021-05-06
Attending: FAMILY MEDICINE
Payer: OTHER GOVERNMENT

## 2021-05-06 DIAGNOSIS — R10.84 GENERALIZED ABDOMINAL PAIN: ICD-10-CM

## 2021-05-06 DIAGNOSIS — R63.4 WEIGHT LOSS: ICD-10-CM

## 2021-05-06 DIAGNOSIS — R10.11 RUQ ABDOMINAL PAIN: ICD-10-CM

## 2021-05-06 LAB
ALBUMIN SERPL BCP-MCNC: 3.2 G/DL (ref 3.5–5.2)
ALBUMIN SERPL BCP-MCNC: 3.2 G/DL (ref 3.5–5.2)
ALP SERPL-CCNC: 53 U/L (ref 55–135)
ALT SERPL W/O P-5'-P-CCNC: 15 U/L (ref 10–44)
ANION GAP SERPL CALC-SCNC: 9 MMOL/L (ref 8–16)
AST SERPL-CCNC: 19 U/L (ref 10–40)
BILIRUB DIRECT SERPL-MCNC: 0.1 MG/DL (ref 0.1–0.3)
BILIRUB SERPL-MCNC: 0.3 MG/DL (ref 0.1–1)
BUN SERPL-MCNC: 10 MG/DL (ref 6–20)
CALCIUM SERPL-MCNC: 9 MG/DL (ref 8.7–10.5)
CHLORIDE SERPL-SCNC: 112 MMOL/L (ref 95–110)
CO2 SERPL-SCNC: 20 MMOL/L (ref 23–29)
CREAT SERPL-MCNC: 1 MG/DL (ref 0.5–1.4)
EST. GFR  (AFRICAN AMERICAN): >60 ML/MIN/1.73 M^2
EST. GFR  (NON AFRICAN AMERICAN): >60 ML/MIN/1.73 M^2
GLUCOSE SERPL-MCNC: 100 MG/DL (ref 70–110)
PHOSPHATE SERPL-MCNC: 2.4 MG/DL (ref 2.7–4.5)
POTASSIUM SERPL-SCNC: 3.8 MMOL/L (ref 3.5–5.1)
PROT SERPL-MCNC: 7.2 G/DL (ref 6–8.4)
SODIUM SERPL-SCNC: 141 MMOL/L (ref 136–145)

## 2021-05-06 PROCEDURE — 80069 RENAL FUNCTION PANEL: CPT | Performed by: FAMILY MEDICINE

## 2021-05-06 PROCEDURE — 86703 HIV-1/HIV-2 1 RESULT ANTBDY: CPT | Performed by: FAMILY MEDICINE

## 2021-05-06 PROCEDURE — 84450 TRANSFERASE (AST) (SGOT): CPT | Performed by: FAMILY MEDICINE

## 2021-05-06 PROCEDURE — 86803 HEPATITIS C AB TEST: CPT | Performed by: FAMILY MEDICINE

## 2021-05-06 PROCEDURE — 84075 ASSAY ALKALINE PHOSPHATASE: CPT | Performed by: FAMILY MEDICINE

## 2021-05-07 LAB
HCV AB SERPL QL IA: NEGATIVE
HIV 1+2 AB+HIV1 P24 AG SERPL QL IA: NEGATIVE

## 2021-05-10 LAB
FINAL PATHOLOGIC DIAGNOSIS: NORMAL
GROSS: NORMAL
Lab: NORMAL

## 2021-05-13 ENCOUNTER — TELEPHONE (OUTPATIENT)
Dept: RADIOLOGY | Facility: HOSPITAL | Age: 41
End: 2021-05-13

## 2021-05-14 ENCOUNTER — HOSPITAL ENCOUNTER (OUTPATIENT)
Dept: RADIOLOGY | Facility: HOSPITAL | Age: 41
Discharge: HOME | End: 2021-05-14
Attending: FAMILY MEDICINE
Payer: OTHER GOVERNMENT

## 2021-05-14 DIAGNOSIS — R10.11 RUQ ABDOMINAL PAIN: ICD-10-CM

## 2021-05-14 DIAGNOSIS — R63.4 WEIGHT LOSS: ICD-10-CM

## 2021-05-14 DIAGNOSIS — R10.84 GENERALIZED ABDOMINAL PAIN: ICD-10-CM

## 2021-05-14 PROCEDURE — 74177 CT ABD & PELVIS W/CONTRAST: CPT | Mod: TC

## 2021-05-14 PROCEDURE — 71260 CT CHEST ABDOMEN PELVIS WITH CONTRAST (XPD): ICD-10-PCS | Mod: 26,,, | Performed by: RADIOLOGY

## 2021-05-14 PROCEDURE — 25500020 PHARM REV CODE 255: Performed by: FAMILY MEDICINE

## 2021-05-14 PROCEDURE — 74177 CT ABD & PELVIS W/CONTRAST: CPT | Mod: 26,,, | Performed by: RADIOLOGY

## 2021-05-14 PROCEDURE — 71260 CT THORAX DX C+: CPT | Mod: TC

## 2021-05-14 PROCEDURE — A9698 NON-RAD CONTRAST MATERIALNOC: HCPCS | Performed by: FAMILY MEDICINE

## 2021-05-14 PROCEDURE — 74177 CT CHEST ABDOMEN PELVIS WITH CONTRAST (XPD): ICD-10-PCS | Mod: 26,,, | Performed by: RADIOLOGY

## 2021-05-14 PROCEDURE — 71260 CT THORAX DX C+: CPT | Mod: 26,,, | Performed by: RADIOLOGY

## 2021-05-14 RX ADMIN — IOHEXOL 75 ML: 350 INJECTION, SOLUTION INTRAVENOUS at 12:05

## 2021-05-14 RX ADMIN — IOHEXOL 1000 ML: 12 SOLUTION ORAL at 11:05

## 2021-05-18 ENCOUNTER — OFFICE VISIT (OUTPATIENT)
Dept: HEMATOLOGY/ONCOLOGY | Facility: CLINIC | Age: 41
End: 2021-05-18
Payer: OTHER GOVERNMENT

## 2021-05-18 ENCOUNTER — TELEPHONE (OUTPATIENT)
Dept: INTERNAL MEDICINE | Facility: CLINIC | Age: 41
End: 2021-05-18

## 2021-05-18 ENCOUNTER — OFFICE VISIT (OUTPATIENT)
Dept: INTERNAL MEDICINE | Facility: CLINIC | Age: 41
End: 2021-05-18
Payer: OTHER GOVERNMENT

## 2021-05-18 ENCOUNTER — LAB VISIT (OUTPATIENT)
Dept: LAB | Facility: HOSPITAL | Age: 41
End: 2021-05-18
Attending: INTERNAL MEDICINE
Payer: OTHER GOVERNMENT

## 2021-05-18 VITALS
WEIGHT: 165.56 LBS | HEART RATE: 71 BPM | SYSTOLIC BLOOD PRESSURE: 114 MMHG | HEIGHT: 63 IN | BODY MASS INDEX: 29.34 KG/M2 | DIASTOLIC BLOOD PRESSURE: 82 MMHG | TEMPERATURE: 98 F | OXYGEN SATURATION: 99 %

## 2021-05-18 VITALS
OXYGEN SATURATION: 99 % | DIASTOLIC BLOOD PRESSURE: 86 MMHG | TEMPERATURE: 98 F | BODY MASS INDEX: 29.38 KG/M2 | HEART RATE: 85 BPM | HEIGHT: 63 IN | SYSTOLIC BLOOD PRESSURE: 102 MMHG | WEIGHT: 165.81 LBS

## 2021-05-18 DIAGNOSIS — T50.905A DRUG-INDUCED HYPOKALEMIA: Chronic | ICD-10-CM

## 2021-05-18 DIAGNOSIS — D75.839 THROMBOCYTOSIS: ICD-10-CM

## 2021-05-18 DIAGNOSIS — E78.2 MODERATE MIXED HYPERLIPIDEMIA NOT REQUIRING STATIN THERAPY: Chronic | ICD-10-CM

## 2021-05-18 DIAGNOSIS — E87.6 DRUG-INDUCED HYPOKALEMIA: Chronic | ICD-10-CM

## 2021-05-18 DIAGNOSIS — E06.3 HYPOTHYROIDISM DUE TO HASHIMOTO'S THYROIDITIS: Chronic | ICD-10-CM

## 2021-05-18 DIAGNOSIS — R63.4 WEIGHT LOSS: ICD-10-CM

## 2021-05-18 DIAGNOSIS — R10.11 RUQ ABDOMINAL PAIN: ICD-10-CM

## 2021-05-18 DIAGNOSIS — F41.1 GENERALIZED ANXIETY DISORDER: Chronic | ICD-10-CM

## 2021-05-18 DIAGNOSIS — Q82.0 HEREDITARY EDEMA OF LEGS: Primary | Chronic | ICD-10-CM

## 2021-05-18 DIAGNOSIS — E03.8 HYPOTHYROIDISM DUE TO HASHIMOTO'S THYROIDITIS: Chronic | ICD-10-CM

## 2021-05-18 DIAGNOSIS — F33.41 RECURRENT MAJOR DEPRESSION IN PARTIAL REMISSION: Chronic | ICD-10-CM

## 2021-05-18 LAB
BASOPHILS # BLD AUTO: 0.05 K/UL (ref 0–0.2)
BASOPHILS NFR BLD: 0.6 % (ref 0–1.9)
DIFFERENTIAL METHOD: NORMAL
EOSINOPHIL # BLD AUTO: 0.2 K/UL (ref 0–0.5)
EOSINOPHIL NFR BLD: 2.8 % (ref 0–8)
ERYTHROCYTE [DISTWIDTH] IN BLOOD BY AUTOMATED COUNT: 13.5 % (ref 11.5–14.5)
HCT VFR BLD AUTO: 45.6 % (ref 37–48.5)
HGB BLD-MCNC: 15.1 G/DL (ref 12–16)
IMM GRANULOCYTES # BLD AUTO: 0.02 K/UL (ref 0–0.04)
IMM GRANULOCYTES NFR BLD AUTO: 0.3 % (ref 0–0.5)
LYMPHOCYTES # BLD AUTO: 2.8 K/UL (ref 1–4.8)
LYMPHOCYTES NFR BLD: 35 % (ref 18–48)
MCH RBC QN AUTO: 30.9 PG (ref 27–31)
MCHC RBC AUTO-ENTMCNC: 33.1 G/DL (ref 32–36)
MCV RBC AUTO: 93 FL (ref 82–98)
MONOCYTES # BLD AUTO: 0.5 K/UL (ref 0.3–1)
MONOCYTES NFR BLD: 6.6 % (ref 4–15)
NEUTROPHILS # BLD AUTO: 4.4 K/UL (ref 1.8–7.7)
NEUTROPHILS NFR BLD: 54.7 % (ref 38–73)
NRBC BLD-RTO: 0 /100 WBC
PLATELET # BLD AUTO: 375 K/UL (ref 150–450)
PMV BLD AUTO: 9.6 FL (ref 9.2–12.9)
RBC # BLD AUTO: 4.89 M/UL (ref 4–5.4)
WBC # BLD AUTO: 7.98 K/UL (ref 3.9–12.7)

## 2021-05-18 PROCEDURE — 99999 PR PBB SHADOW E&M-EST. PATIENT-LVL III: ICD-10-PCS | Mod: PBBFAC,,, | Performed by: FAMILY MEDICINE

## 2021-05-18 PROCEDURE — 81270 JAK2 GENE: CPT | Performed by: INTERNAL MEDICINE

## 2021-05-18 PROCEDURE — 83540 ASSAY OF IRON: CPT | Performed by: INTERNAL MEDICINE

## 2021-05-18 PROCEDURE — 99999 PR PBB SHADOW E&M-EST. PATIENT-LVL IV: CPT | Mod: PBBFAC,,, | Performed by: INTERNAL MEDICINE

## 2021-05-18 PROCEDURE — 82728 ASSAY OF FERRITIN: CPT | Performed by: INTERNAL MEDICINE

## 2021-05-18 PROCEDURE — 99999 PR PBB SHADOW E&M-EST. PATIENT-LVL IV: ICD-10-PCS | Mod: PBBFAC,,, | Performed by: INTERNAL MEDICINE

## 2021-05-18 PROCEDURE — 99204 PR OFFICE/OUTPT VISIT, NEW, LEVL IV, 45-59 MIN: ICD-10-PCS | Mod: S$GLB,,, | Performed by: INTERNAL MEDICINE

## 2021-05-18 PROCEDURE — 99214 PR OFFICE/OUTPT VISIT, EST, LEVL IV, 30-39 MIN: ICD-10-PCS | Mod: S$GLB,,, | Performed by: FAMILY MEDICINE

## 2021-05-18 PROCEDURE — 36415 COLL VENOUS BLD VENIPUNCTURE: CPT | Performed by: INTERNAL MEDICINE

## 2021-05-18 PROCEDURE — 85025 COMPLETE CBC W/AUTO DIFF WBC: CPT | Performed by: INTERNAL MEDICINE

## 2021-05-18 PROCEDURE — 99214 OFFICE O/P EST MOD 30 MIN: CPT | Mod: S$GLB,,, | Performed by: FAMILY MEDICINE

## 2021-05-18 PROCEDURE — 99999 PR PBB SHADOW E&M-EST. PATIENT-LVL III: CPT | Mod: PBBFAC,,, | Performed by: FAMILY MEDICINE

## 2021-05-18 PROCEDURE — 99204 OFFICE O/P NEW MOD 45 MIN: CPT | Mod: S$GLB,,, | Performed by: INTERNAL MEDICINE

## 2021-05-18 RX ORDER — LEVOTHYROXINE SODIUM 50 UG/1
50 TABLET ORAL
Qty: 90 TABLET | Refills: 3 | Status: SHIPPED | OUTPATIENT
Start: 2021-05-18 | End: 2021-10-04

## 2021-05-19 ENCOUNTER — E-CONSULT (OUTPATIENT)
Dept: ENDOCRINOLOGY | Facility: CLINIC | Age: 41
End: 2021-05-19
Payer: OTHER GOVERNMENT

## 2021-05-19 ENCOUNTER — PATIENT MESSAGE (OUTPATIENT)
Dept: INTERNAL MEDICINE | Facility: CLINIC | Age: 41
End: 2021-05-19

## 2021-05-19 DIAGNOSIS — R63.4 WEIGHT LOSS: Primary | ICD-10-CM

## 2021-05-19 LAB
FERRITIN SERPL-MCNC: 102 NG/ML (ref 20–300)
IRON SERPL-MCNC: 61 UG/DL (ref 30–160)
SATURATED IRON: 14 % (ref 20–50)
TOTAL IRON BINDING CAPACITY: 443 UG/DL (ref 250–450)
TRANSFERRIN SERPL-MCNC: 299 MG/DL (ref 200–375)

## 2021-05-19 PROCEDURE — 99451 PR INTERPROF, PHONE/INTERNET/EHR, CONSULT, >= 5MINS: ICD-10-PCS | Mod: S$GLB,,, | Performed by: INTERNAL MEDICINE

## 2021-05-19 PROCEDURE — 99451 NTRPROF PH1/NTRNET/EHR 5/>: CPT | Mod: S$GLB,,, | Performed by: INTERNAL MEDICINE

## 2021-05-25 ENCOUNTER — LAB VISIT (OUTPATIENT)
Dept: LAB | Facility: HOSPITAL | Age: 41
End: 2021-05-25
Attending: FAMILY MEDICINE
Payer: OTHER GOVERNMENT

## 2021-05-25 DIAGNOSIS — E87.6 DRUG-INDUCED HYPOKALEMIA: Chronic | ICD-10-CM

## 2021-05-25 DIAGNOSIS — R63.4 WEIGHT LOSS: ICD-10-CM

## 2021-05-25 DIAGNOSIS — T50.905A DRUG-INDUCED HYPOKALEMIA: Chronic | ICD-10-CM

## 2021-05-25 LAB
ALBUMIN SERPL BCP-MCNC: 3.3 G/DL (ref 3.5–5.2)
ANION GAP SERPL CALC-SCNC: 9 MMOL/L (ref 8–16)
BUN SERPL-MCNC: 13 MG/DL (ref 6–20)
CALCIUM SERPL-MCNC: 9.1 MG/DL (ref 8.7–10.5)
CHLORIDE SERPL-SCNC: 114 MMOL/L (ref 95–110)
CO2 SERPL-SCNC: 19 MMOL/L (ref 23–29)
CORTIS SERPL-MCNC: 22.6 UG/DL (ref 4.3–22.4)
CREAT SERPL-MCNC: 1.1 MG/DL (ref 0.5–1.4)
EST. GFR  (AFRICAN AMERICAN): >60 ML/MIN/1.73 M^2
EST. GFR  (NON AFRICAN AMERICAN): >60 ML/MIN/1.73 M^2
GLUCOSE SERPL-MCNC: 78 MG/DL (ref 70–110)
IGA SERPL-MCNC: 216 MG/DL (ref 40–350)
PHOSPHATE SERPL-MCNC: 2.3 MG/DL (ref 2.7–4.5)
POTASSIUM SERPL-SCNC: 4 MMOL/L (ref 3.5–5.1)
SODIUM SERPL-SCNC: 142 MMOL/L (ref 136–145)

## 2021-05-25 PROCEDURE — 80069 RENAL FUNCTION PANEL: CPT | Performed by: FAMILY MEDICINE

## 2021-05-25 PROCEDURE — 82784 ASSAY IGA/IGD/IGG/IGM EACH: CPT | Performed by: FAMILY MEDICINE

## 2021-05-25 PROCEDURE — 36415 COLL VENOUS BLD VENIPUNCTURE: CPT | Performed by: FAMILY MEDICINE

## 2021-05-25 PROCEDURE — 82024 ASSAY OF ACTH: CPT | Performed by: FAMILY MEDICINE

## 2021-05-25 PROCEDURE — 83516 IMMUNOASSAY NONANTIBODY: CPT | Performed by: FAMILY MEDICINE

## 2021-05-25 PROCEDURE — 82533 TOTAL CORTISOL: CPT | Performed by: FAMILY MEDICINE

## 2021-05-26 LAB
MPNR  FINAL DIAGNOSIS: NORMAL
MPNR  SPECIMEN TYPE: NORMAL
MPNR RESULT: NORMAL

## 2021-05-28 LAB
ACTH PLAS-MCNC: 14 PG/ML (ref 0–46)
TTG IGA SER-ACNC: 6 UNITS

## 2021-09-23 DIAGNOSIS — F33.41 RECURRENT MAJOR DEPRESSION IN PARTIAL REMISSION: Chronic | ICD-10-CM

## 2021-09-23 DIAGNOSIS — F41.1 GENERALIZED ANXIETY DISORDER: Chronic | ICD-10-CM

## 2021-09-27 RX ORDER — BUPROPION HYDROCHLORIDE 300 MG/1
TABLET ORAL
Qty: 90 TABLET | Refills: 4 | Status: SHIPPED | OUTPATIENT
Start: 2021-09-27 | End: 2022-03-03 | Stop reason: SDUPTHER

## 2021-09-30 DIAGNOSIS — E03.8 HYPOTHYROIDISM DUE TO HASHIMOTO'S THYROIDITIS: Chronic | ICD-10-CM

## 2021-09-30 DIAGNOSIS — E06.3 HYPOTHYROIDISM DUE TO HASHIMOTO'S THYROIDITIS: Chronic | ICD-10-CM

## 2021-10-04 RX ORDER — LEVOTHYROXINE SODIUM 50 UG/1
TABLET ORAL
Qty: 90 TABLET | Refills: 2 | Status: SHIPPED | OUTPATIENT
Start: 2021-10-04 | End: 2022-03-03 | Stop reason: SDUPTHER

## 2021-12-07 ENCOUNTER — PATIENT MESSAGE (OUTPATIENT)
Dept: INTERNAL MEDICINE | Facility: CLINIC | Age: 41
End: 2021-12-07
Payer: OTHER GOVERNMENT

## 2022-03-03 ENCOUNTER — OFFICE VISIT (OUTPATIENT)
Dept: INTERNAL MEDICINE | Facility: CLINIC | Age: 42
End: 2022-03-03
Payer: OTHER GOVERNMENT

## 2022-03-03 VITALS
HEIGHT: 63 IN | WEIGHT: 153 LBS | RESPIRATION RATE: 16 BRPM | TEMPERATURE: 99 F | DIASTOLIC BLOOD PRESSURE: 74 MMHG | SYSTOLIC BLOOD PRESSURE: 102 MMHG | BODY MASS INDEX: 27.11 KG/M2 | OXYGEN SATURATION: 95 % | HEART RATE: 94 BPM

## 2022-03-03 DIAGNOSIS — E06.3 HYPOTHYROIDISM DUE TO HASHIMOTO'S THYROIDITIS: Chronic | ICD-10-CM

## 2022-03-03 DIAGNOSIS — Z00.00 PREVENTATIVE HEALTH CARE: Primary | ICD-10-CM

## 2022-03-03 DIAGNOSIS — R06.09 DYSPNEA ON EXERTION: ICD-10-CM

## 2022-03-03 DIAGNOSIS — E03.8 HYPOTHYROIDISM DUE TO HASHIMOTO'S THYROIDITIS: Chronic | ICD-10-CM

## 2022-03-03 DIAGNOSIS — D32.9 MENINGIOMA: Chronic | ICD-10-CM

## 2022-03-03 DIAGNOSIS — R53.83 FATIGUE, UNSPECIFIED TYPE: ICD-10-CM

## 2022-03-03 DIAGNOSIS — E78.2 MODERATE MIXED HYPERLIPIDEMIA NOT REQUIRING STATIN THERAPY: Chronic | ICD-10-CM

## 2022-03-03 DIAGNOSIS — F33.41 RECURRENT MAJOR DEPRESSION IN PARTIAL REMISSION: Chronic | ICD-10-CM

## 2022-03-03 DIAGNOSIS — R63.4 WEIGHT LOSS: ICD-10-CM

## 2022-03-03 DIAGNOSIS — F41.1 GENERALIZED ANXIETY DISORDER: Chronic | ICD-10-CM

## 2022-03-03 DIAGNOSIS — I95.1 ORTHOSTATIC HYPOTENSION: ICD-10-CM

## 2022-03-03 DIAGNOSIS — D75.839 THROMBOCYTOSIS: ICD-10-CM

## 2022-03-03 PROBLEM — I10 BENIGN ESSENTIAL HYPERTENSION: Status: RESOLVED | Noted: 2017-09-01 | Resolved: 2022-03-03

## 2022-03-03 PROBLEM — T50.905A DRUG-INDUCED HYPOKALEMIA: Chronic | Status: RESOLVED | Noted: 2020-07-29 | Resolved: 2022-03-03

## 2022-03-03 PROBLEM — E87.6 DRUG-INDUCED HYPOKALEMIA: Chronic | Status: RESOLVED | Noted: 2020-07-29 | Resolved: 2022-03-03

## 2022-03-03 PROCEDURE — 80053 COMPREHEN METABOLIC PANEL: CPT | Performed by: FAMILY MEDICINE

## 2022-03-03 PROCEDURE — 84439 ASSAY OF FREE THYROXINE: CPT | Performed by: FAMILY MEDICINE

## 2022-03-03 PROCEDURE — 99396 PR PREVENTIVE VISIT,EST,40-64: ICD-10-PCS | Mod: S$GLB,,, | Performed by: FAMILY MEDICINE

## 2022-03-03 PROCEDURE — 99396 PREV VISIT EST AGE 40-64: CPT | Mod: S$GLB,,, | Performed by: FAMILY MEDICINE

## 2022-03-03 PROCEDURE — 85027 COMPLETE CBC AUTOMATED: CPT | Performed by: FAMILY MEDICINE

## 2022-03-03 PROCEDURE — 99999 PR PBB SHADOW E&M-EST. PATIENT-LVL V: ICD-10-PCS | Mod: PBBFAC,,, | Performed by: FAMILY MEDICINE

## 2022-03-03 PROCEDURE — 80061 LIPID PANEL: CPT | Performed by: FAMILY MEDICINE

## 2022-03-03 PROCEDURE — 84443 ASSAY THYROID STIM HORMONE: CPT | Performed by: FAMILY MEDICINE

## 2022-03-03 PROCEDURE — 99999 PR PBB SHADOW E&M-EST. PATIENT-LVL V: CPT | Mod: PBBFAC,,, | Performed by: FAMILY MEDICINE

## 2022-03-03 RX ORDER — BUPROPION HYDROCHLORIDE 300 MG/1
300 TABLET ORAL DAILY
Qty: 90 TABLET | Refills: 4 | Status: SHIPPED | OUTPATIENT
Start: 2022-03-03 | End: 2022-08-19 | Stop reason: SDUPTHER

## 2022-03-03 RX ORDER — LEVOTHYROXINE SODIUM 50 UG/1
50 TABLET ORAL
Qty: 90 TABLET | Refills: 3 | Status: SHIPPED | OUTPATIENT
Start: 2022-03-03 | End: 2022-08-19 | Stop reason: SDUPTHER

## 2022-03-03 RX ORDER — CLONAZEPAM 0.5 MG/1
.25-.5 TABLET ORAL NIGHTLY PRN
Qty: 30 TABLET | Refills: 5 | Status: SHIPPED | OUTPATIENT
Start: 2022-03-03 | End: 2022-08-19 | Stop reason: SDUPTHER

## 2022-03-03 NOTE — ASSESSMENT & PLAN NOTE
Wt Readings from Last 10 Encounters:   03/03/22 69.4 kg (153 lb)   05/18/21 75.1 kg (165 lb 9.1 oz)   05/18/21 75.2 kg (165 lb 12.6 oz)   05/03/21 66.8 kg (147 lb 4.3 oz)   04/15/21 74.3 kg (163 lb 12.8 oz)   12/20/19 86.6 kg (190 lb 14.7 oz)   10/14/19 87.9 kg (193 lb 12.6 oz)   04/02/19 87.6 kg (193 lb 2 oz)   04/01/19 87.3 kg (192 lb 7.4 oz)   11/29/18 86.1 kg (189 lb 13.1 oz)

## 2022-03-03 NOTE — ASSESSMENT & PLAN NOTE
Lab Results   Component Value Date    CHOL 236 (H) 06/24/2020    CHOL 231 (H) 12/20/2019    TRIG 140 06/24/2020    TRIG 108 12/20/2019    HDL 54 06/24/2020    HDL 44 12/20/2019    LDLCALC 154.0 06/24/2020    LDLCALC 165.4 (H) 12/20/2019    NONHDLCHOL 182 06/24/2020    NONHDLCHOL 187 12/20/2019    AST 19 05/06/2021    ALT 15 05/06/2021     The 10-year ASCVD risk score (Bobbi NINA Jr., et al., 2013) is: 0.6%    Values used to calculate the score:      Age: 41 years      Sex: Female      Is Non- : No      Diabetic: No      Tobacco smoker: No      Systolic Blood Pressure: 102 mmHg      Is BP treated: No      HDL Cholesterol: 54 mg/dL      Total Cholesterol: 236 mg/dL

## 2022-03-03 NOTE — PROGRESS NOTES
WELLNESS VISIT (PREVENTIVE SERVICES)  3/3/22  3:00 PM CST  LAST ENCOUNTER WITH ME:  5/18/2021   HEALTH MAINTENANCE INTERVENTIONS - UP TO DATE  Health Maintenance Topics with due status: Not Due       Topic Last Completion Date    TETANUS VACCINE 10/12/2015    Mammogram 08/11/2021    Cervical Cancer Screening 08/18/2021       HEALTH MAINTENANCE INTERVENTIONS - DUE OR DUE SOON  There are no preventive care reminders to display for this patient.    CHIEF COMPLAINT: Annual Wellness Visit (Preventive Services)    She's continuing to lose weight, but she is not as concerned about that as she is her chronic fatigue, which is been going on for greater than a year. She describes it as a combination of hypersomnolence and dyspnea on exertion. She had home sleep test done a few years ago when she was much heavier, and it did not show obstructive sleep apnea. We had a lengthy discussion about differential diagnosis. It was agreed to proceed with evaluation as ordered.    DIAGNOSES SPECIFICALLY EVALUATED AND TREATED THIS ENCOUNTER  1. Preventative health care  - CBC Without Differential  - Lipid Panel  - T4, Free  - TSH  - Comprehensive Metabolic Panel    2. Hypothyroidism due to Hashimoto's thyroiditis  - levothyroxine (SYNTHROID) 50 MCG tablet; Take 1 tablet (50 mcg total) by mouth before breakfast.  Dispense: 90 tablet; Refill: 3  - T4, Free  - TSH    3. Weight loss, involuntary, abnormal  - Comprehensive Metabolic Panel    4. Thrombocytosis  - CBC Without Differential    5. Fatigue, unspecified type  - Ambulatory referral/consult to Cardiology; Future  - T4, Free  - TSH  - Comprehensive Metabolic Panel    6. Dyspnea on exertion  - Ambulatory referral/consult to Cardiology; Future    7. Orthostatic hypotension  - Ambulatory referral/consult to Cardiology; Future    8. Generalized anxiety disorder  - buPROPion (WELLBUTRIN XL) 300 MG 24 hr tablet; Take 1 tablet (300 mg total) by mouth once daily.  Dispense: 90 tablet; Refill:  4  - clonazePAM (KLONOPIN) 0.5 MG tablet; Take 0.5-1 tablets (0.25-0.5 mg total) by mouth nightly as needed (for anxiety or insomnia).  Dispense: 30 tablet; Refill: 5    9. Recurrent major depression in partial remission  - buPROPion (WELLBUTRIN XL) 300 MG 24 hr tablet; Take 1 tablet (300 mg total) by mouth once daily.  Dispense: 90 tablet; Refill: 4    10. Moderate mixed hyperlipidemia not requiring statin therapy    11. Meningioma, left temporal region     Follow up in about 6 months (around 8/22/2022) for re-evaluate problem(s) discussed today.    Problem List Items Addressed This Visit     Hypothyroidism due to Hashimoto's thyroiditis (Chronic)    Current Assessment & Plan     Lab Results   Component Value Date    TSH 1.980 04/15/2021    TSH 2.043 06/24/2020    TSH 3.173 12/20/2019     No results found for: THYROIDSTIMI, THYROPEROXID, THYGLBTUM, THGABSCRN, THYRGINTERP            Relevant Medications    levothyroxine (SYNTHROID) 50 MCG tablet    Other Relevant Orders    T4, Free    TSH    Recurrent major depression in partial remission (Chronic)    Relevant Medications    buPROPion (WELLBUTRIN XL) 300 MG 24 hr tablet    Generalized anxiety disorder (Chronic)    Relevant Medications    buPROPion (WELLBUTRIN XL) 300 MG 24 hr tablet    clonazePAM (KLONOPIN) 0.5 MG tablet    Moderate mixed hyperlipidemia not requiring statin therapy (Chronic)    Current Assessment & Plan     Lab Results   Component Value Date    CHOL 236 (H) 06/24/2020    CHOL 231 (H) 12/20/2019    TRIG 140 06/24/2020    TRIG 108 12/20/2019    HDL 54 06/24/2020    HDL 44 12/20/2019    LDLCALC 154.0 06/24/2020    LDLCALC 165.4 (H) 12/20/2019    NONHDLCHOL 182 06/24/2020    NONHDLCHOL 187 12/20/2019    AST 19 05/06/2021    ALT 15 05/06/2021     The 10-year ASCVD risk score (Bobbi NINA Jr., et al., 2013) is: 0.6%    Values used to calculate the score:      Age: 41 years      Sex: Female      Is Non- : No      Diabetic: No       Tobacco smoker: No      Systolic Blood Pressure: 102 mmHg      Is BP treated: No      HDL Cholesterol: 54 mg/dL      Total Cholesterol: 236 mg/dL            Thrombocytosis    Current Assessment & Plan     Asymptomatic. Improved.  Lab Results   Component Value Date     05/18/2021     (H) 04/15/2021     (H) 06/24/2020     (H) 12/20/2019     Lab Results   Component Value Date    WBC 7.98 05/18/2021    HGB 15.1 05/18/2021    HCT 45.6 05/18/2021    MCV 93 05/18/2021     05/18/2021                 Relevant Orders    CBC Without Differential    Meningioma, left temporal region (Chronic)    Overview     NEUROSURGEON: Dr. Zhu  NEUROLOGIST: Dr. Satya Carias           Current Assessment & Plan     Clinically stable.           Weight loss, involuntary, abnormal    Current Assessment & Plan     Wt Readings from Last 10 Encounters:   03/03/22 69.4 kg (153 lb)   05/18/21 75.1 kg (165 lb 9.1 oz)   05/18/21 75.2 kg (165 lb 12.6 oz)   05/03/21 66.8 kg (147 lb 4.3 oz)   04/15/21 74.3 kg (163 lb 12.8 oz)   12/20/19 86.6 kg (190 lb 14.7 oz)   10/14/19 87.9 kg (193 lb 12.6 oz)   04/02/19 87.6 kg (193 lb 2 oz)   04/01/19 87.3 kg (192 lb 7.4 oz)   11/29/18 86.1 kg (189 lb 13.1 oz)               Relevant Orders    Comprehensive Metabolic Panel    Orthostatic hypotension    Relevant Orders    Ambulatory referral/consult to Cardiology      Other Visit Diagnoses     Preventative health care    -  Primary    Relevant Orders    CBC Without Differential    Lipid Panel    T4, Free    TSH    Comprehensive Metabolic Panel    Fatigue, unspecified type        Relevant Orders    Ambulatory referral/consult to Cardiology    T4, Free    TSH    Comprehensive Metabolic Panel    Dyspnea on exertion        Relevant Orders    Ambulatory referral/consult to Cardiology      Unless noted herein, any chronic conditions are represented as and appear compensated/controlled and stable, and no other significant complaints or  concerns were reported.    PRESCRIPTION DRUG MANAGEMENT  Outpatient Medications Prior to Visit   Medication Sig Dispense Refill    meclizine (ANTIVERT) 25 mg tablet Take 1 tablet (25 mg total) by mouth 3 (three) times daily as needed for Dizziness. 90 tablet 1    norgestrel-ethinyl estradioL (LO/OVRAL) 0.3-30 mg-mcg per tablet Take 1 tablet by mouth once daily.      topiramate (TOPAMAX) 50 MG tablet Take 50 mg by mouth once daily.      buPROPion (WELLBUTRIN XL) 300 MG 24 hr tablet TAKE 1 TABLET(300 MG) BY MOUTH EVERY DAY 90 tablet 4    diazePAM (VALIUM) 2 MG tablet Take 1 tablet (2 mg total) by mouth 3 (three) times daily as needed for Anxiety. 90 tablet 3    levothyroxine (SYNTHROID) 50 MCG tablet TAKE 1 TABLET(50 MCG) BY MOUTH EVERY DAY 90 tablet 2     No facility-administered medications prior to visit.     Medications Discontinued During This Encounter   Medication Reason    buPROPion (WELLBUTRIN XL) 300 MG 24 hr tablet Reorder    levothyroxine (SYNTHROID) 50 MCG tablet Reorder    diazePAM (VALIUM) 2 MG tablet Alternate therapy     Medications Ordered This Encounter   Medications    buPROPion (WELLBUTRIN XL) 300 MG 24 hr tablet     Sig: Take 1 tablet (300 mg total) by mouth once daily.     Dispense:  90 tablet     Refill:  4    clonazePAM (KLONOPIN) 0.5 MG tablet     Sig: Take 0.5-1 tablets (0.25-0.5 mg total) by mouth nightly as needed (for anxiety or insomnia).     Dispense:  30 tablet     Refill:  5     This REPLACES diazepam. DISCONTINUE any existing prescription for diazepam.    levothyroxine (SYNTHROID) 50 MCG tablet     Sig: Take 1 tablet (50 mcg total) by mouth before breakfast.     Dispense:  90 tablet     Refill:  3       PHYSICAL EXAM  Vitals:    03/03/22 1501 03/03/22 1513 03/03/22 1514 03/03/22 1518   BP: 90/70 98/72 (!) 82/68 102/74   BP Location: Left arm Left arm Left arm Left arm   Patient Position: Sitting Lying Standing Standing   BP Method: Medium (Manual) Medium (Manual) Medium  "(Manual) Medium (Manual)   Pulse: 90 83 81 94   Resp: 16      Temp: 98.5 °F (36.9 °C)      TempSrc: Oral      SpO2: 95%      Weight: 69.4 kg (153 lb)      Height: 5' 3" (1.6 m)      CONSTITUTIONAL: Vital signs noted. No apparent distress. Does not appear acutely ill or septic. Appears adequately hydrated.  PULM: Lungs clear. Breathing unlabored.  HEART: Regular.  DERM: Skin warm and moist with normal turgor.  NEURO: There are no gross focal motor deficits.  PSYCHIATRIC: Alert and conversant. Mood grossly neutral. Judgment and insight grossly intact.      PAST MEDICAL HISTORY  Paty has a past medical history of Depression, Digestive disorder, Hypertension, Hypothyroidism, Meniere's disease, left ear, Meningioma, left temporal region, Moderate mixed hyperlipidemia not requiring statin therapy, Plantar fascial fibromatosis, PONV (postoperative nausea and vomiting), Recurrent suppurative otitis media of right ear with spontaneous tympanic membrane rupture, and Vertigo.    SURGICAL HISTORY  Paty has a past surgical history that includes [place] tooth extraction; epidural steroid injection; Cholecystectomy; and Esophagogastroduodenoscopy (N/A, 5/3/2021).    FAMILY HISTORY  Paty family history includes ADD / ADHD in her brother; Ankylosing spondylitis in her mother; Hyperlipidemia in her brother; Hyperparathyroidism in her mother; Hypertension in her brother; Hypothyroidism in her mother; Sarcoidosis in her mother; Sjogren's syndrome in her mother.     ALLERGIES  Review of patient's allergies indicates:   Allergen Reactions    Wasp venom Rash     Mental confusion.       SOCIAL HISTORY  Paty  reports that she has never smoked. She has never used smokeless tobacco. She reports current alcohol use. She reports that she does not use drugs.     Documentation entered by me for this encounter may have been done in part using speech-recognition technology. Although I have made an effort to ensure accuracy, "sound like" errors may " exist and should be interpreted in context.

## 2022-03-03 NOTE — ASSESSMENT & PLAN NOTE
Lab Results   Component Value Date    TSH 1.980 04/15/2021    TSH 2.043 06/24/2020    TSH 3.173 12/20/2019     No results found for: THYROIDSTIMI, THYROPEROXID, THYGLBTUM, THGABSCRN, THYRGINTERP

## 2022-03-04 DIAGNOSIS — Z76.89 ESTABLISHING CARE WITH NEW DOCTOR, ENCOUNTER FOR: Primary | ICD-10-CM

## 2022-03-04 LAB
ALBUMIN SERPL BCP-MCNC: 3.6 G/DL (ref 3.5–5.2)
ALP SERPL-CCNC: 56 U/L (ref 55–135)
ALT SERPL W/O P-5'-P-CCNC: 11 U/L (ref 10–44)
ANION GAP SERPL CALC-SCNC: 14 MMOL/L (ref 8–16)
AST SERPL-CCNC: 22 U/L (ref 10–40)
BILIRUB SERPL-MCNC: 0.3 MG/DL (ref 0.1–1)
BUN SERPL-MCNC: 14 MG/DL (ref 6–20)
CALCIUM SERPL-MCNC: 9.5 MG/DL (ref 8.7–10.5)
CHLORIDE SERPL-SCNC: 109 MMOL/L (ref 95–110)
CHOLEST SERPL-MCNC: 217 MG/DL (ref 120–199)
CHOLEST/HDLC SERPL: 4.5 {RATIO} (ref 2–5)
CO2 SERPL-SCNC: 19 MMOL/L (ref 23–29)
CREAT SERPL-MCNC: 0.8 MG/DL (ref 0.5–1.4)
ERYTHROCYTE [DISTWIDTH] IN BLOOD BY AUTOMATED COUNT: 13.2 % (ref 11.5–14.5)
EST. GFR  (AFRICAN AMERICAN): >60 ML/MIN/1.73 M^2
EST. GFR  (NON AFRICAN AMERICAN): >60 ML/MIN/1.73 M^2
GLUCOSE SERPL-MCNC: 64 MG/DL (ref 70–110)
HCT VFR BLD AUTO: 47.3 % (ref 37–48.5)
HDLC SERPL-MCNC: 48 MG/DL (ref 40–75)
HDLC SERPL: 22.1 % (ref 20–50)
HGB BLD-MCNC: 15.1 G/DL (ref 12–16)
LDLC SERPL CALC-MCNC: 144.8 MG/DL (ref 63–159)
MCH RBC QN AUTO: 30.9 PG (ref 27–31)
MCHC RBC AUTO-ENTMCNC: 31.9 G/DL (ref 32–36)
MCV RBC AUTO: 97 FL (ref 82–98)
NONHDLC SERPL-MCNC: 169 MG/DL
PLATELET # BLD AUTO: 457 K/UL (ref 150–450)
PMV BLD AUTO: 10.3 FL (ref 9.2–12.9)
POTASSIUM SERPL-SCNC: 4 MMOL/L (ref 3.5–5.1)
PROT SERPL-MCNC: 7.9 G/DL (ref 6–8.4)
RBC # BLD AUTO: 4.88 M/UL (ref 4–5.4)
SODIUM SERPL-SCNC: 142 MMOL/L (ref 136–145)
T4 FREE SERPL-MCNC: 1.07 NG/DL (ref 0.71–1.51)
TRIGL SERPL-MCNC: 121 MG/DL (ref 30–150)
TSH SERPL DL<=0.005 MIU/L-ACNC: 2.26 UIU/ML (ref 0.4–4)
WBC # BLD AUTO: 9.51 K/UL (ref 3.9–12.7)

## 2022-03-11 ENCOUNTER — OFFICE VISIT (OUTPATIENT)
Dept: CARDIOLOGY | Facility: CLINIC | Age: 42
End: 2022-03-11
Payer: OTHER GOVERNMENT

## 2022-03-11 ENCOUNTER — HOSPITAL ENCOUNTER (OUTPATIENT)
Dept: CARDIOLOGY | Facility: HOSPITAL | Age: 42
Discharge: HOME | End: 2022-03-11
Attending: INTERNAL MEDICINE
Payer: OTHER GOVERNMENT

## 2022-03-11 VITALS
WEIGHT: 154 LBS | SYSTOLIC BLOOD PRESSURE: 122 MMHG | BODY MASS INDEX: 27.29 KG/M2 | HEART RATE: 83 BPM | HEIGHT: 63 IN | DIASTOLIC BLOOD PRESSURE: 84 MMHG | OXYGEN SATURATION: 98 %

## 2022-03-11 DIAGNOSIS — Z76.89 ESTABLISHING CARE WITH NEW DOCTOR, ENCOUNTER FOR: ICD-10-CM

## 2022-03-11 DIAGNOSIS — R94.31 EKG ABNORMALITIES: ICD-10-CM

## 2022-03-11 DIAGNOSIS — R06.09 DYSPNEA ON EXERTION: Primary | ICD-10-CM

## 2022-03-11 DIAGNOSIS — R53.83 FATIGUE, UNSPECIFIED TYPE: ICD-10-CM

## 2022-03-11 DIAGNOSIS — I95.1 ORTHOSTATIC HYPOTENSION: ICD-10-CM

## 2022-03-11 PROCEDURE — 93010 ELECTROCARDIOGRAM REPORT: CPT | Mod: ,,, | Performed by: INTERNAL MEDICINE

## 2022-03-11 PROCEDURE — 99999 PR PBB SHADOW E&M-EST. PATIENT-LVL IV: ICD-10-PCS | Mod: PBBFAC,,, | Performed by: INTERNAL MEDICINE

## 2022-03-11 PROCEDURE — 99205 PR OFFICE/OUTPT VISIT, NEW, LEVL V, 60-74 MIN: ICD-10-PCS | Mod: S$GLB,,, | Performed by: INTERNAL MEDICINE

## 2022-03-11 PROCEDURE — 99999 PR PBB SHADOW E&M-EST. PATIENT-LVL IV: CPT | Mod: PBBFAC,,, | Performed by: INTERNAL MEDICINE

## 2022-03-11 PROCEDURE — 93010 EKG 12-LEAD: ICD-10-PCS | Mod: ,,, | Performed by: INTERNAL MEDICINE

## 2022-03-11 PROCEDURE — 99205 OFFICE O/P NEW HI 60 MIN: CPT | Mod: S$GLB,,, | Performed by: INTERNAL MEDICINE

## 2022-03-11 PROCEDURE — 93005 ELECTROCARDIOGRAM TRACING: CPT

## 2022-03-11 NOTE — PROGRESS NOTES
Subjective:   Patient ID:  Paty Parham is a 41 y.o. female who presents for evaluation of No chief complaint on file.      42 yo female, referred for SOB, and fatigue  PMH vertigo on topamax, hypothyroidism, anxiety. No smoking/drinking. Exercise walking 3 miles a day 5 days a week. On 3 mph.  SOB for 3 months. occurred under mild exertion and at rest. Even at sleep. Negative sleep study twice. No h/o covid infection.   Black vision when standing up from the bed and walking to [facility]. No pased  Out. And falling.soem orthostatic hypotension   Fatigue.  No f/h prmature CAD  EKG NSR and low QRS voltage                  Past Medical History:   Diagnosis Date    Depression     Digestive disorder     Hypertension     Hypothyroidism     Meniere's disease, left ear     Meningioma, left temporal region 6/24/2020    NEUROSURGEON: Dr. Zhu NEUROLOGIST: Dr. Satya Carias    Moderate mixed hyperlipidemia not requiring statin therapy 6/24/2020    Plantar fascial fibromatosis     PONV (postoperative nausea and vomiting)     Recurrent suppurative otitis media of right ear with spontaneous tympanic membrane rupture     Vertigo        Past Surgical History:   Procedure Laterality Date    CHOLECYSTECTOMY      epidural steroid injection      ESOPHAGOGASTRODUODENOSCOPY N/A 5/3/2021    Procedure: ESOPHAGOGASTRODUODENOSCOPY (EGD);  Surgeon: Mirian Hernandez MD;  Location: [facility];  Service: Endoscopy;  Laterality: N/A;    WISDOM TOOTH EXTRACTION      X 1       Social History     Tobacco Use    Smoking status: Never Smoker    Smokeless tobacco: Never Used   Substance Use Topics    Alcohol use: Yes     Comment: <3 drinks per week.      Drug use: No       Family History   Problem Relation Age of Onset    Hypothyroidism Mother     Hyperparathyroidism Mother     Sjogren's syndrome Mother     Ankylosing spondylitis Mother     Sarcoidosis Mother     ADD / ADHD Brother     Hypertension Brother      Hyperlipidemia Brother        Review of Systems   Constitutional: Positive for malaise/fatigue. Negative for decreased appetite, diaphoresis, fever and night sweats.   HENT: Negative for nosebleeds.    Eyes: Negative for blurred vision and double vision.   Cardiovascular: Positive for dyspnea on exertion and near-syncope. Negative for chest pain, claudication, irregular heartbeat, leg swelling, orthopnea, palpitations, paroxysmal nocturnal dyspnea and syncope.   Respiratory: Negative for cough, shortness of breath, sleep disturbances due to breathing, snoring, sputum production and wheezing.    Endocrine: Negative for cold intolerance and polyuria.   Hematologic/Lymphatic: Does not bruise/bleed easily.   Skin: Negative for rash.   Musculoskeletal: Negative for back pain, falls, joint pain, joint swelling and neck pain.   Gastrointestinal: Negative for abdominal pain, heartburn, nausea and vomiting.   Genitourinary: Negative for dysuria, frequency and hematuria.   Neurological: Negative for difficulty with concentration, dizziness, focal weakness, headaches, light-headedness, numbness, seizures and weakness.   Psychiatric/Behavioral: Negative for depression, memory loss and substance abuse. The patient does not have insomnia.    Allergic/Immunologic: Negative for HIV exposure and hives.       Objective:   Physical Exam  HENT:      Head: Normocephalic.   Eyes:      Pupils: Pupils are equal, round, and reactive to light.   Neck:      Thyroid: No thyromegaly.      Vascular: Normal carotid pulses. No carotid bruit or JVD.   Cardiovascular:      Rate and Rhythm: Normal rate and regular rhythm.  No extrasystoles are present.     Chest Wall: PMI is not displaced.      Pulses: Normal pulses.      Heart sounds: Normal heart sounds. No murmur heard.    No gallop. No S3 sounds.   Pulmonary:      Effort: No respiratory distress.      Breath sounds: Normal breath sounds. No stridor.   Abdominal:      General: Bowel sounds are  normal.      Palpations: Abdomen is soft.      Tenderness: There is no abdominal tenderness. There is no rebound.   Musculoskeletal:         General: Normal range of motion.   Skin:     Findings: No rash.   Neurological:      Mental Status: She is alert and oriented to person, place, and time.   Psychiatric:         Behavior: Behavior normal.         Lab Results   Component Value Date    CHOL 217 (H) 03/03/2022    CHOL 236 (H) 06/24/2020    CHOL 231 (H) 12/20/2019     Lab Results   Component Value Date    HDL 48 03/03/2022    HDL 54 06/24/2020    HDL 44 12/20/2019     Lab Results   Component Value Date    LDLCALC 144.8 03/03/2022    LDLCALC 154.0 06/24/2020    LDLCALC 165.4 (H) 12/20/2019     Lab Results   Component Value Date    TRIG 121 03/03/2022    TRIG 140 06/24/2020    TRIG 108 12/20/2019     Lab Results   Component Value Date    CHOLHDL 22.1 03/03/2022    CHOLHDL 22.9 06/24/2020    CHOLHDL 19.0 (L) 12/20/2019       Chemistry        Component Value Date/Time     03/03/2022 1619    K 4.0 03/03/2022 1619     03/03/2022 1619    CO2 19 (L) 03/03/2022 1619    BUN 14 03/03/2022 1619    CREATININE 0.8 03/03/2022 1619    GLU 64 (L) 03/03/2022 1619        Component Value Date/Time    CALCIUM 9.5 03/03/2022 1619    ALKPHOS 56 03/03/2022 1619    AST 22 03/03/2022 1619    ALT 11 03/03/2022 1619    BILITOT 0.3 03/03/2022 1619    ESTGFRAFRICA >60.0 03/03/2022 1619    EGFRNONAA >60.0 03/03/2022 1619          No results found for: LABA1C, HGBA1C  Lab Results   Component Value Date    TSH 2.262 03/03/2022     No results found for: INR, PROTIME  Lab Results   Component Value Date    WBC 9.51 03/03/2022    HGB 15.1 03/03/2022    HCT 47.3 03/03/2022    MCV 97 03/03/2022     (H) 03/03/2022     BNP  @LABRCNTIP(BNP,BNPTRIAGEBLO)@  CrCl cannot be calculated (Patient's most recent lab result is older than the maximum 7 days allowed.).  No results found in the last 24 hours.  No results found in the last 24  hours.  No results found in the last 24 hours.    Assessment:      1. Dyspnea on exertion    2. Fatigue, unspecified type    3. Orthostatic hypotension    4. EKG abnormalities      SOB at rest  Near syncope vasa vagal reaction  nonspecific fatigue  Good exercise    Plan:   Echo for SOB and abnormal EKG  ETT for SOB and fatigue  Hydration and discussed the strategy to avoid near syncope  F/u wit neurology to r/o CVA    Counseled DASH  Check Lipid profile in 6 months  Recommend heart-healthy diet, weight control and regular exercise.  Blessing. Risk modification.   I have reviewed all pertinent labs and cardiac studies independently. Plans and recommendations have been formulated under my direct supervision. All questions answered and patient voiced understanding.   If symptoms persist go to the ED  F/u with PCP

## 2022-03-21 ENCOUNTER — HOSPITAL ENCOUNTER (OUTPATIENT)
Dept: CARDIOLOGY | Facility: HOSPITAL | Age: 42
Discharge: HOME | End: 2022-03-21
Attending: INTERNAL MEDICINE
Payer: OTHER GOVERNMENT

## 2022-03-21 VITALS
HEIGHT: 63 IN | SYSTOLIC BLOOD PRESSURE: 122 MMHG | WEIGHT: 153 LBS | DIASTOLIC BLOOD PRESSURE: 84 MMHG | BODY MASS INDEX: 27.11 KG/M2

## 2022-03-21 DIAGNOSIS — R53.83 FATIGUE, UNSPECIFIED TYPE: ICD-10-CM

## 2022-03-21 DIAGNOSIS — R06.09 DYSPNEA ON EXERTION: ICD-10-CM

## 2022-03-21 PROCEDURE — 93306 TTE W/DOPPLER COMPLETE: CPT

## 2022-03-21 PROCEDURE — 93017 CV STRESS TEST TRACING ONLY: CPT

## 2022-03-21 PROCEDURE — 93306 ECHO (CUPID ONLY): ICD-10-PCS | Mod: 26,,, | Performed by: INTERNAL MEDICINE

## 2022-03-21 PROCEDURE — 93016 CV STRESS TEST SUPVJ ONLY: CPT | Mod: ,,, | Performed by: INTERNAL MEDICINE

## 2022-03-21 PROCEDURE — 93018 CV STRESS TEST I&R ONLY: CPT | Mod: ,,, | Performed by: INTERNAL MEDICINE

## 2022-03-21 PROCEDURE — 93306 TTE W/DOPPLER COMPLETE: CPT | Mod: 26,,, | Performed by: INTERNAL MEDICINE

## 2022-03-21 PROCEDURE — 93018 EXERCISE STRESS - EKG (CUPID ONLY): ICD-10-PCS | Mod: ,,, | Performed by: INTERNAL MEDICINE

## 2022-03-21 PROCEDURE — 93016 EXERCISE STRESS - EKG (CUPID ONLY): ICD-10-PCS | Mod: ,,, | Performed by: INTERNAL MEDICINE

## 2022-03-22 LAB
AORTIC ROOT ANNULUS: 2.42 CM
AV INDEX (PROSTH): 0.71
AV MEAN GRADIENT: 3 MMHG
AV PEAK GRADIENT: 6 MMHG
AV VALVE AREA: 2.14 CM2
AV VELOCITY RATIO: 0.83
BSA FOR ECHO PROCEDURE: 1.76 M2
CV ECHO LV RWT: 0.37 CM
CV STRESS BASE HR: 85 BPM
DIASTOLIC BLOOD PRESSURE: 93 MMHG
DOP CALC AO PEAK VEL: 1.24 M/S
DOP CALC AO VTI: 24.1 CM
DOP CALC LVOT AREA: 3 CM2
DOP CALC LVOT DIAMETER: 1.96 CM
DOP CALC LVOT PEAK VEL: 1.03 M/S
DOP CALC LVOT STROKE VOLUME: 51.57 CM3
DOP CALC RVOT PEAK VEL: 1.05 M/S
DOP CALC RVOT VTI: 20.5 CM
DOP CALCLVOT PEAK VEL VTI: 17.1 CM
E WAVE DECELERATION TIME: 182.88 MSEC
E/A RATIO: 1.21
E/E' RATIO: 6 M/S
ECHO EF ESTIMATED: 70 %
ECHO LV POSTERIOR WALL: 0.8 CM (ref 0.6–1.1)
EJECTION FRACTION: 60 %
FRACTIONAL SHORTENING: 39 % (ref 28–44)
INTERVENTRICULAR SEPTUM: 0.81 CM (ref 0.6–1.1)
IVRT: 82.78 MSEC
LA MAJOR: 3.57 CM
LA MINOR: 3.79 CM
LA WIDTH: 2.29 CM
LEFT ATRIUM SIZE: 2.61 CM
LEFT ATRIUM VOLUME INDEX MOD: 14.6 ML/M2
LEFT ATRIUM VOLUME INDEX: 10.8 ML/M2
LEFT ATRIUM VOLUME MOD: 25.29 CM3
LEFT ATRIUM VOLUME: 18.68 CM3
LEFT INTERNAL DIMENSION IN SYSTOLE: 2.63 CM (ref 2.1–4)
LEFT VENTRICLE DIASTOLIC VOLUME INDEX: 49.12 ML/M2
LEFT VENTRICLE DIASTOLIC VOLUME: 84.97 ML
LEFT VENTRICLE MASS INDEX: 62 G/M2
LEFT VENTRICLE SYSTOLIC VOLUME INDEX: 14.7 ML/M2
LEFT VENTRICLE SYSTOLIC VOLUME: 25.37 ML
LEFT VENTRICULAR INTERNAL DIMENSION IN DIASTOLE: 4.34 CM (ref 3.5–6)
LEFT VENTRICULAR MASS: 107.84 G
LV LATERAL E/E' RATIO: 6.21 M/S
LV SEPTAL E/E' RATIO: 5.8 M/S
LVOT MG: 2.07 MMHG
LVOT MV: 0.67 CM/S
MV PEAK A VEL: 0.72 M/S
MV PEAK E VEL: 0.87 M/S
OHS CV CPX 1 MINUTE RECOVERY HEART RATE: 141 BPM
OHS CV CPX 85 PERCENT MAX PREDICTED HEART RATE MALE: 144
OHS CV CPX ESTIMATED METS: 8
OHS CV CPX MAX PREDICTED HEART RATE: 170
OHS CV CPX PATIENT IS FEMALE: 1
OHS CV CPX PATIENT IS MALE: 0
OHS CV CPX PEAK DIASTOLIC BLOOD PRESSURE: 90 MMHG
OHS CV CPX PEAK HEAR RATE: 153 BPM
OHS CV CPX PEAK RATE PRESSURE PRODUCT: NORMAL
OHS CV CPX PEAK SYSTOLIC BLOOD PRESSURE: 189 MMHG
OHS CV CPX PERCENT MAX PREDICTED HEART RATE ACHIEVED: 90
OHS CV CPX RATE PRESSURE PRODUCT PRESENTING: NORMAL
PISA TR MAX VEL: 2.05 M/S
PULM VEIN S/D RATIO: 1.49
PV MEAN GRADIENT: 1.99 MMHG
PV MV: 0.75 M/S
PV PEAK D VEL: 0.43 M/S
PV PEAK S VEL: 0.64 M/S
PV PEAK VELOCITY: 1.09 CM/S
RA MAJOR: 3.23 CM
RA WIDTH: 2.45 CM
RIGHT VENTRICULAR END-DIASTOLIC DIMENSION: 2.48 CM
SINUS: 2.46 CM
STJ: 2.15 CM
STRESS ECHO POST EXERCISE DUR MIN: 6 MINUTES
STRESS ECHO POST EXERCISE DUR SEC: 37 SECONDS
SYSTOLIC BLOOD PRESSURE: 119 MMHG
TDI LATERAL: 0.14 M/S
TDI SEPTAL: 0.15 M/S
TDI: 0.15 M/S
TR MAX PG: 17 MMHG
TRICUSPID ANNULAR PLANE SYSTOLIC EXCURSION: 1.84 CM

## 2022-03-23 ENCOUNTER — TELEPHONE (OUTPATIENT)
Dept: CARDIOLOGY | Facility: CLINIC | Age: 42
End: 2022-03-23
Payer: OTHER GOVERNMENT

## 2022-03-23 NOTE — TELEPHONE ENCOUNTER
Pt contacted about results, pt verbalized understanding.          ----- Message from Ellie Gilliland sent at 3/23/2022 12:10 PM CDT -----  Contact: Paty  Type:  Patient Returning Call    Who Called: Paty  Who Left Message for Patient:   Does the patient know what this is regarding?: Echo and Stress test results  Would the patient rather a call back or a response via [facility]?  Call back  Best Call Back Number: [phone]  Additional Information:       Echo showed normal function and valvular structures     Stress test showed no ischemia. Exercise capacity normal

## 2022-03-23 NOTE — TELEPHONE ENCOUNTER
Patient contacted and was advised to return the call to the clinic. ----- Message from Jared Mcdowell MD sent at 3/22/2022  7:24 PM CDT -----  Echo showed normal function and valvular structures    Stress test showed no ischemia. Exercise capacity normal

## 2022-03-28 DIAGNOSIS — E87.6 HYPOKALEMIA: ICD-10-CM

## 2022-03-29 NOTE — TELEPHONE ENCOUNTER
Refill Routing Note   Medication(s) are not appropriate for processing by [facility] for the following reason(s):      - Medication not active on medication list    [facility] action(s):  Defer          --->Care Gap information included in message below if applicable.   Medication reconciliation completed: No   Automatic Epic Generated Protocol Data:        Requested Prescriptions   Pending Prescriptions Disp Refills    potassium chloride SA (K-DUR,KLOR-CON) 20 MEQ tablet [Pharmacy Med Name: POTASSIUM CL 20MEQ ER TABLETS] 180 tablet 4     Sig: TAKE 1 TABLET(20 MEQ) BY MOUTH TWICE DAILY       Endocrinology:  Minerals - Potassium Supplementation Failed - 3/29/2022  7:26 AM        Failed - Matches previous order       Previous Authorizing Provider: JOSSELIN Hunter MD (potassium chloride SA (K-DUR,KLOR-CON) 20 MEQ tablet)  Previous Pharmacy: [id number] [place] [address] AT [facility]            Passed - Patient is at least 18 years old        Passed - Negative Pregnancy Status Check        Passed - Valid encounter within last 15 months     Recent Visits  Date Type Provider Dept   03/03/22 Office Visit JOSSELIN Hunter MD [facility]   05/18/21 Office Visit JOSSELIN Hunter MD [facility]   04/15/21 Office Visit JOSSELIN Hunter MD [facility]   06/24/20 Office Visit JOSSELIN Hunter MD [facility]   Showing recent visits within past 720 days and meeting all other requirements  Future Appointments  No visits were found meeting these conditions.  Showing future appointments within next 150 days and meeting all other requirements      Future Appointments              In 4 months JOSSELIN Hunter MD [facility] - Internal Med [facility] [place]                Passed - No ED/Hospital visits since last PCP visit     Last PCP Visit: 3/3/2022 Last Admission: 5/3/2021 Last ED Visit:           Passed - K is 5.2 or below and  within 360 days     Potassium   Date Value Ref Range Status   03/03/2022 4.0 3.5 - 5.1 mmol/L Final   05/25/2021 4.0 3.5 - 5.1 mmol/L Final   05/06/2021 3.8 3.5 - 5.1 mmol/L Final              Passed - Cr is 1.39 or below and within 360 days     Lab Results   Component Value Date    CREATININE 0.8 03/03/2022    CREATININE 1.1 05/25/2021    CREATININE 1.0 05/06/2021              Passed - eGFR within 360 days     Lab Results   Component Value Date    EGFRNONAA >60.0 03/03/2022    EGFRNONAA >60.0 05/25/2021    EGFRNONAA >60.0 05/06/2021                      Appointments  past 12m or future 3m with PCP    Date Provider   Last Visit   3/3/2022 JOSSELIN Hunter MD   Next Visit   8/19/2022 JOSSELIN Hunter MD   ED visits in past 90 days: 0        Note composed:5:05 PM 03/29/2022

## 2022-03-29 NOTE — TELEPHONE ENCOUNTER
No new care gaps identified.  Powered by [employer]. Reference number: [id number].   3/29/2022 7:26:30 AM CDT

## 2022-04-11 RX ORDER — POTASSIUM CHLORIDE 20 MEQ/1
TABLET, EXTENDED RELEASE ORAL
Qty: 180 TABLET | Refills: 1 | Status: SHIPPED | OUTPATIENT
Start: 2022-04-11 | End: 2022-08-19 | Stop reason: SDUPTHER

## 2022-04-11 NOTE — TELEPHONE ENCOUNTER
REFILL REQUEST APPROVED  Requested Prescriptions   Pending Prescriptions Disp Refills    potassium chloride SA (K-DUR,KLOR-CON) 20 MEQ tablet [Pharmacy Med Name: POTASSIUM CL 20MEQ ER TABLETS] 180 tablet 1     Sig: TAKE 1 TABLET(20 MEQ) BY MOUTH TWICE DAILY

## 2022-08-19 ENCOUNTER — TELEPHONE (OUTPATIENT)
Dept: SURGERY | Facility: CLINIC | Age: 42
End: 2022-08-19
Payer: OTHER GOVERNMENT

## 2022-08-19 ENCOUNTER — OFFICE VISIT (OUTPATIENT)
Dept: INTERNAL MEDICINE | Facility: CLINIC | Age: 42
End: 2022-08-19
Payer: OTHER GOVERNMENT

## 2022-08-19 VITALS
SYSTOLIC BLOOD PRESSURE: 104 MMHG | HEIGHT: 63 IN | OXYGEN SATURATION: 98 % | WEIGHT: 159.19 LBS | HEART RATE: 79 BPM | BODY MASS INDEX: 28.21 KG/M2 | DIASTOLIC BLOOD PRESSURE: 82 MMHG | TEMPERATURE: 98 F

## 2022-08-19 DIAGNOSIS — R63.4 WEIGHT LOSS: Chronic | ICD-10-CM

## 2022-08-19 DIAGNOSIS — R42 VERTIGO: Chronic | ICD-10-CM

## 2022-08-19 DIAGNOSIS — E78.2 MODERATE MIXED HYPERLIPIDEMIA NOT REQUIRING STATIN THERAPY: Chronic | ICD-10-CM

## 2022-08-19 DIAGNOSIS — E03.8 HYPOTHYROIDISM DUE TO HASHIMOTO'S THYROIDITIS: Primary | Chronic | ICD-10-CM

## 2022-08-19 DIAGNOSIS — F33.41 RECURRENT MAJOR DEPRESSION IN PARTIAL REMISSION: Chronic | ICD-10-CM

## 2022-08-19 DIAGNOSIS — E87.6 HYPOKALEMIA: ICD-10-CM

## 2022-08-19 DIAGNOSIS — E06.3 HYPOTHYROIDISM DUE TO HASHIMOTO'S THYROIDITIS: Primary | Chronic | ICD-10-CM

## 2022-08-19 DIAGNOSIS — F41.1 GENERALIZED ANXIETY DISORDER: Chronic | ICD-10-CM

## 2022-08-19 DIAGNOSIS — Z12.39 BREAST CANCER SCREENING, HIGH RISK PATIENT: ICD-10-CM

## 2022-08-19 DIAGNOSIS — D75.839 THROMBOCYTOSIS: Chronic | ICD-10-CM

## 2022-08-19 LAB — POTASSIUM SERPL-SCNC: 4 MMOL/L (ref 3.5–5.1)

## 2022-08-19 PROCEDURE — 99999 PR PBB SHADOW E&M-EST. PATIENT-LVL IV: CPT | Mod: PBBFAC,,, | Performed by: FAMILY MEDICINE

## 2022-08-19 PROCEDURE — 99214 OFFICE O/P EST MOD 30 MIN: CPT | Mod: S$GLB,,, | Performed by: FAMILY MEDICINE

## 2022-08-19 PROCEDURE — 99214 PR OFFICE/OUTPT VISIT, EST, LEVL IV, 30-39 MIN: ICD-10-PCS | Mod: S$GLB,,, | Performed by: FAMILY MEDICINE

## 2022-08-19 PROCEDURE — 84132 ASSAY OF SERUM POTASSIUM: CPT | Performed by: FAMILY MEDICINE

## 2022-08-19 PROCEDURE — 99999 PR PBB SHADOW E&M-EST. PATIENT-LVL IV: ICD-10-PCS | Mod: PBBFAC,,, | Performed by: FAMILY MEDICINE

## 2022-08-19 RX ORDER — LEVOTHYROXINE SODIUM 50 UG/1
50 TABLET ORAL
Qty: 90 TABLET | Refills: 3 | Status: SHIPPED | OUTPATIENT
Start: 2022-08-19 | End: 2023-01-05 | Stop reason: SDUPTHER

## 2022-08-19 RX ORDER — MECLIZINE HYDROCHLORIDE 25 MG/1
25 TABLET ORAL 3 TIMES DAILY PRN
Qty: 90 TABLET | Refills: 3 | Status: SHIPPED | OUTPATIENT
Start: 2022-08-19 | End: 2023-01-05 | Stop reason: SDUPTHER

## 2022-08-19 RX ORDER — POTASSIUM CHLORIDE 20 MEQ/1
20 TABLET, EXTENDED RELEASE ORAL 2 TIMES DAILY
Qty: 180 TABLET | Refills: 3 | Status: SHIPPED | OUTPATIENT
Start: 2022-08-19 | End: 2022-10-11

## 2022-08-19 RX ORDER — BUPROPION HYDROCHLORIDE 300 MG/1
300 TABLET ORAL DAILY
Qty: 90 TABLET | Refills: 3 | Status: SHIPPED | OUTPATIENT
Start: 2022-08-19 | End: 2023-01-05 | Stop reason: SDUPTHER

## 2022-08-19 RX ORDER — CLONAZEPAM 0.5 MG/1
.25-.5 TABLET ORAL NIGHTLY PRN
Qty: 30 TABLET | Refills: 5 | Status: SHIPPED | OUTPATIENT
Start: 2022-08-19 | End: 2022-12-08 | Stop reason: SDUPTHER

## 2022-08-19 NOTE — ASSESSMENT & PLAN NOTE
"Improved.  Wt Readings from Last 6 Encounters:   08/19/22 72.2 kg (159 lb 2.8 oz)   03/21/22 69.4 kg (153 lb)   03/11/22 69.9 kg (154 lb)   03/03/22 69.4 kg (153 lb)   05/18/21 75.1 kg (165 lb 9.1 oz)   05/18/21 75.2 kg (165 lb 12.6 oz)     Estimated body mass index is 28.2 kg/m² as calculated from the following:    Height as of this encounter: 5' 3" (1.6 m).    Weight as of this encounter: 72.2 kg (159 lb 2.8 oz).    "

## 2022-08-19 NOTE — TELEPHONE ENCOUNTER
Spoke to and scheduled patient's appt with Dr Delarosa for Thursday 8/25 [facility] @ 0930 - Patient correctly repeated back all appointment information      ----- Message from JOSSELIN Hunter MD sent at 8/19/2022  8:22 AM CDT -----  Hi, Dr. Delarosa & Team.    Paty is one of my favorite patients. She has been followed at [facility] for high-risk breast cancer screening with alternating breast MRIs and mammograms every 6 months.    I referred Paty to you to take over her breast cancer screening. I asked my team to schedule her appointment.    If Paty's appointment hasn't been scheduled by the time you read this, would you please have someone from your office contact Paty to schedule her appointment?    Message or call me with any questions.    Thank you for your help caring for my patients!    -ARIN

## 2022-08-19 NOTE — ASSESSMENT & PLAN NOTE
Asymptomatic. Clinically stable.  Lab Results   Component Value Date     (H) 03/03/2022     05/18/2021     (H) 04/15/2021     (H) 06/24/2020     Lab Results   Component Value Date    WBC 9.51 03/03/2022    HGB 15.1 03/03/2022    HCT 47.3 03/03/2022    MCV 97 03/03/2022     (H) 03/03/2022

## 2022-08-19 NOTE — ASSESSMENT & PLAN NOTE
Paty reports she is doing well on her current medicines. Paty reports preceiving no adverse side-effects and wants to continue current treatment.

## 2022-08-19 NOTE — ASSESSMENT & PLAN NOTE
Lab Results   Component Value Date    K 4.0 03/03/2022    K 4.0 05/25/2021    K 3.8 05/06/2021    CREATININE 0.8 03/03/2022    BUN 14 03/03/2022

## 2022-08-19 NOTE — Clinical Note
Hi, Dr. Delarosa & Team.  Paty is one of my favorite patients. She has been followed at [facility] for high-risk breast cancer screening with alternating breast MRIs and mammograms every 6 months.  I referred Paty to you to take over her breast cancer screening. I asked my team to schedule her appointment.  If Paty's appointment hasn't been scheduled by the time you read this, would you please have someone from your office contact Paty to schedule her appointment?  Message or call me with any questions.  Thank you for your help caring for my patients!  -ARIN

## 2022-08-19 NOTE — ASSESSMENT & PLAN NOTE
Lab Results   Component Value Date    TSH 2.262 03/03/2022    TSH 1.980 04/15/2021    TSH 2.043 06/24/2020    FREET4 1.07 03/03/2022     No results found for: THYROIDSTIMI, THYROPEROXID, THYGLBTUM, THGABSCRN, THYRGINTERP

## 2022-08-19 NOTE — ASSESSMENT & PLAN NOTE
Lab Results   Component Value Date    CHOL 217 (H) 03/03/2022    CHOL 236 (H) 06/24/2020    TRIG 121 03/03/2022    TRIG 140 06/24/2020    HDL 48 03/03/2022    HDL 54 06/24/2020    LDLCALC 144.8 03/03/2022    LDLCALC 154.0 06/24/2020    NONHDLCHOL 169 03/03/2022    NONHDLCHOL 182 06/24/2020    AST 22 03/03/2022    ALT 11 03/03/2022     The 10-year ASCVD risk score (Bobbi NINA Jr., et al., 2013) is: 0.7%    Values used to calculate the score:      Age: 42 years      Sex: Female      Is Non- : No      Diabetic: No      Tobacco smoker: No      Systolic Blood Pressure: 104 mmHg      Is BP treated: No      HDL Cholesterol: 48 mg/dL      Total Cholesterol: 217 mg/dL

## 2022-08-19 NOTE — PROGRESS NOTES
OFFICE VISIT 8/19/22  7:30 AM CDT    CHIEF COMPLAINT: Follow-up    Paty reports that her chronic conditions are stable. She says she is doing well on her current medicines, she reports preceiving no adverse side-effects and wants to continue current treatment.     DIAGNOSES SPECIFICALLY EVALUATED AND TREATED THIS ENCOUNTER  1. Hypokalemia  - potassium chloride SA (K-DUR,KLOR-CON) 20 MEQ tablet; Take 1 tablet (20 mEq total) by mouth 2 (two) times a day.  Dispense: 180 tablet; Refill: 3  - POTASSIUM; Future  - Comprehensive Metabolic Panel; Future    2. Vertigo with ataxia  - meclizine (ANTIVERT) 25 mg tablet; Take 1 tablet (25 mg total) by mouth 3 (three) times daily as needed for Dizziness.  Dispense: 90 tablet; Refill: 3    3. Hypothyroidism due to Hashimoto's thyroiditis  - levothyroxine (SYNTHROID) 50 MCG tablet; Take 1 tablet (50 mcg total) by mouth before breakfast.  Dispense: 90 tablet; Refill: 3  - TSH; Future  - T4, Free; Future    4. Generalized anxiety disorder  - clonazePAM (KLONOPIN) 0.5 MG tablet; Take 0.5-1 tablets (0.25-0.5 mg total) by mouth nightly as needed (for anxiety or insomnia).  Dispense: 30 tablet; Refill: 5  - buPROPion (WELLBUTRIN XL) 300 MG 24 hr tablet; Take 1 tablet (300 mg total) by mouth once daily.  Dispense: 90 tablet; Refill: 3    5. Recurrent major depression in partial remission  - buPROPion (WELLBUTRIN XL) 300 MG 24 hr tablet; Take 1 tablet (300 mg total) by mouth once daily.  Dispense: 90 tablet; Refill: 3    6. Moderate mixed hyperlipidemia not requiring statin therapy  - Comprehensive Metabolic Panel; Future  - Lipid Panel; Future    7. Thrombocytosis  - CBC Without Differential; Future    8. Weight loss    9. Breast cancer screening, high risk patient  - Ambulatory referral/consult to Breast Surgery; Future     --------------------------------------------------------------------------------   PATY TATE (MRN [id number], APPT: 8/19/22  7:30 AM  CDT)  --------------------------------  Ready to check out. See orders.   Clinic collect lab (potassium)   Schedule other labs (fasting) in late February.   Schedule in-office appointment with me a few days after those labs.  Thanks.  --------------------------------------------------------------------------------       Follow up in about 6 months (around 3/1/2023) for review test results, discuss treatment plan, wellness and preventive services.   Future Appointments   Date Time Provider Department Center   2/10/2023  7:45 AM [place], [facility] [place]   2/17/2023  9:00 AM JOSSELIN Hunter MD [facility]     Problem List Items Addressed This Visit     Vertigo with ataxia (Chronic)    Overview     NEUROLOGIST: Dr. Satya Carias           Relevant Medications    meclizine (ANTIVERT) 25 mg tablet    Hypothyroidism due to Hashimoto's thyroiditis - Primary (Chronic)    Current Assessment & Plan     Lab Results   Component Value Date    TSH 2.262 03/03/2022    TSH 1.980 04/15/2021    TSH 2.043 06/24/2020    FREET4 1.07 03/03/2022     No results found for: THYROIDSTIMI, THYROPEROXID, THYGLBTUM, THGABSCRN, THYRGINTERP            Relevant Medications    levothyroxine (SYNTHROID) 50 MCG tablet    Other Relevant Orders    TSH    T4, Free    Recurrent major depression in partial remission (Chronic)    Current Assessment & Plan     Paty reports she is doing well on her current medicines . Paty reports preceiving no adverse side-effects and wants to continue current treatment .            Relevant Medications    buPROPion (WELLBUTRIN XL) 300 MG 24 hr tablet    Generalized anxiety disorder (Chronic)    Current Assessment & Plan     Paty reports she is doing well on her current medicines . Paty reports preceiving no adverse side-effects and wants to continue current treatment .            Relevant Medications    clonazePAM (KLONOPIN) 0.5 MG tablet    buPROPion (WELLBUTRIN XL) 300 MG 24 hr tablet     Thrombocytosis (Chronic)    Current Assessment & Plan     Asymptomatic. Clinically stable.  Lab Results   Component Value Date     (H) 03/03/2022     05/18/2021     (H) 04/15/2021     (H) 06/24/2020     Lab Results   Component Value Date    WBC 9.51 03/03/2022    HGB 15.1 03/03/2022    HCT 47.3 03/03/2022    MCV 97 03/03/2022     (H) 03/03/2022                 Relevant Orders    CBC Without Differential    Moderate mixed hyperlipidemia not requiring statin therapy (Chronic)    Current Assessment & Plan     Lab Results   Component Value Date    CHOL 217 (H) 03/03/2022    CHOL 236 (H) 06/24/2020    TRIG 121 03/03/2022    TRIG 140 06/24/2020    HDL 48 03/03/2022    HDL 54 06/24/2020    LDLCALC 144.8 03/03/2022    LDLCALC 154.0 06/24/2020    NONHDLCHOL 169 03/03/2022    NONHDLCHOL 182 06/24/2020    AST 22 03/03/2022    ALT 11 03/03/2022     The 10-year ASCVD risk score (Bobbi NINA Jr., et al., 2013) is: 0.7%    Values used to calculate the score:      Age: 42 years      Sex: Female      Is Non- : No      Diabetic: No      Tobacco smoker: No      Systolic Blood Pressure: 104 mmHg      Is BP treated: No      HDL Cholesterol: 48 mg/dL      Total Cholesterol: 217 mg/dL            Relevant Orders    Comprehensive Metabolic Panel    Lipid Panel    Hypokalemia (Chronic)    Current Assessment & Plan     Lab Results   Component Value Date    K 4.0 03/03/2022    K 4.0 05/25/2021    K 3.8 05/06/2021    CREATININE 0.8 03/03/2022    BUN 14 03/03/2022              Relevant Medications    potassium chloride SA (K-DUR,KLOR-CON) 20 MEQ tablet    Other Relevant Orders    POTASSIUM    Comprehensive Metabolic Panel    Weight loss (Chronic)    Current Assessment & Plan     Improved.  Wt Readings from Last 6 Encounters:   08/19/22 72.2 kg (159 lb 2.8 oz)   03/21/22 69.4 kg (153 lb)   03/11/22 69.9 kg (154 lb)   03/03/22 69.4 kg (153 lb)   05/18/21 75.1 kg (165 lb 9.1 oz)   05/18/21 75.2  "kg (165 lb 12.6 oz)     Estimated body mass index is 28.2 kg/m² as calculated from the following:    Height as of this encounter: 5' 3" (1.6 m).    Weight as of this encounter: 72.2 kg (159 lb 2.8 oz).               Other Visit Diagnoses     Breast cancer screening, high risk patient        Relevant Orders    Ambulatory referral/consult to Breast Surgery      Unless noted herein, any chronic conditions are represented as and appear compensated/controlled and stable, and no other significant complaints or concerns were reported.    PRESCRIPTION DRUG MANAGEMENT  Outpatient Medications Prior to Visit   Medication Sig Dispense Refill    norgestrel-ethinyl estradioL (LO/OVRAL) 0.3-30 mg-mcg per tablet Take 1 tablet by mouth once daily.      topiramate (TOPAMAX) 50 MG tablet Take 50 mg by mouth once daily.      buPROPion (WELLBUTRIN XL) 300 MG 24 hr tablet Take 1 tablet (300 mg total) by mouth once daily. 90 tablet 4    clonazePAM (KLONOPIN) 0.5 MG tablet Take 0.5-1 tablets (0.25-0.5 mg total) by mouth nightly as needed (for anxiety or insomnia). 30 tablet 5    levothyroxine (SYNTHROID) 50 MCG tablet Take 1 tablet (50 mcg total) by mouth before breakfast. 90 tablet 3    meclizine (ANTIVERT) 25 mg tablet Take 1 tablet (25 mg total) by mouth 3 (three) times daily as needed for Dizziness. 90 tablet 1    potassium chloride SA (K-DUR,KLOR-CON) 20 MEQ tablet TAKE 1 TABLET(20 MEQ) BY MOUTH TWICE DAILY 180 tablet 1     No facility-administered medications prior to visit.     Medications Discontinued During This Encounter   Medication Reason    meclizine (ANTIVERT) 25 mg tablet Reorder    levothyroxine (SYNTHROID) 50 MCG tablet Reorder    buPROPion (WELLBUTRIN XL) 300 MG 24 hr tablet Reorder    clonazePAM (KLONOPIN) 0.5 MG tablet Reorder    potassium chloride SA (K-DUR,KLOR-CON) 20 MEQ tablet Reorder     Medications Ordered This Encounter   Medications    buPROPion (WELLBUTRIN XL) 300 MG 24 hr tablet     Sig: Take 1 " "tablet (300 mg total) by mouth once daily.     Dispense:  90 tablet     Refill:  3    clonazePAM (KLONOPIN) 0.5 MG tablet     Sig: Take 0.5-1 tablets (0.25-0.5 mg total) by mouth nightly as needed (for anxiety or insomnia).     Dispense:  30 tablet     Refill:  5    levothyroxine (SYNTHROID) 50 MCG tablet     Sig: Take 1 tablet (50 mcg total) by mouth before breakfast.     Dispense:  90 tablet     Refill:  3    meclizine (ANTIVERT) 25 mg tablet     Sig: Take 1 tablet (25 mg total) by mouth 3 (three) times daily as needed for Dizziness.     Dispense:  90 tablet     Refill:  3    potassium chloride SA (K-DUR,KLOR-CON) 20 MEQ tablet     Sig: Take 1 tablet (20 mEq total) by mouth 2 (two) times a day.     Dispense:  180 tablet     Refill:  3     PHYSICAL EXAM  Vitals:    08/19/22 0739   BP: 104/82   BP Location: Left arm   Patient Position: Sitting   BP Method: Large (Manual)   Pulse: 79   Temp: 97.6 °F (36.4 °C)   TempSrc: Tympanic   SpO2: 98%   Weight: 72.2 kg (159 lb 2.8 oz)   Height: 5' 3" (1.6 m)   CONSTITUTIONAL: Vital signs noted. No apparent distress. Does not appear acutely ill or septic. Appears adequately hydrated.  EYE: Sclerae anicteric. Lids and conjunctiva unremarkable.  ENT: External ENT unremarkable. Hearing grossly intact.  NECK: Trachea midline. Thyroid nontender.  PULM: Lungs clear. Breathing unlabored.  CV: Auscultation reveals regular rate and rhythm. Normal heart sounds. No carotid bruit.  GI: Soft and nontender. Bowel sounds normal.  DERM: Skin warm and moist with normal turgor.  NEURO: There are no gross focal motor deficits.  PSYCH: Alert and oriented x 3. Mood is grossly neutral. Affect appropriate. Judgment and insight grossly intact.    Documentation entered by me for this encounter may have been done in part using speech-recognition technology. Although I have made an effort to ensure accuracy, "sound like" errors may exist and should be interpreted in context.   "

## 2022-08-22 ENCOUNTER — TELEPHONE (OUTPATIENT)
Dept: SURGERY | Facility: CLINIC | Age: 42
End: 2022-08-22
Payer: OTHER GOVERNMENT

## 2022-08-22 NOTE — TELEPHONE ENCOUNTER
Spoke to and advised patient I scheduled her appointment with Dr Delarosa this Thursday incorrectly and need to reschedule the appointment from Thursday to Friday 8/26 [facility] @ 0900 - Also reiterated from our previous discussion that she will need to bring her imaging with her to this appointment - Patient verbalized understanding of all information given       ----- Message from Elana Delarosa MD sent at 8/19/2022  5:56 PM CDT -----  Can you ask her to bring any documentation of her breast imaging if she has it  ----- Message -----  From: Cintia Newell LPN  Sent: 8/19/2022   4:34 PM CDT  To: JOSSELIN Hunter MD, Elana Delarosa MD    I've spoken to and scheduled patient for this coming week. Please let me know if I can help with anything else.     ----- Message -----  From: JOSSELIN Hunter MD  Sent: 8/19/2022   8:22 AM CDT  To: Elana Delarosa MD, Isaura Huitron [facility], Dr. Delarosa & Team.    Paty is one of my favorite patients. She has been followed at [facility] for high-risk breast cancer screening with alternating breast MRIs and mammograms every 6 months.    I referred Paty to you to take over her breast cancer screening. I asked my team to schedule her appointment.    If Paty's appointment hasn't been scheduled by the time you read this, would you please have someonhursdae from your office contact Paty to schedule her appointment?    Message or call me with any questions.    Thank you for your help caring for my patients!    -ARIN

## 2022-08-23 NOTE — PROGRESS NOTES
"NORMAL  ----------  If you want to learn more about your test results and what they mean, it's as simple as 1, 2, 3.  1. Log in to [facility] using the [facility]ela or online at [email or web address].   2. From the "Test Results" tab, click on the test you want to know more about.  3. If using the mobile angela, click the "Get Info" icon. (The "Get Info" icon looks like a [place] with a lower case letter i inside it.) If using your computer, click the "Get Info" icon or the "About This Test" link."

## 2022-08-26 ENCOUNTER — OFFICE VISIT (OUTPATIENT)
Dept: SURGERY | Facility: CLINIC | Age: 42
End: 2022-08-26
Payer: OTHER GOVERNMENT

## 2022-08-26 DIAGNOSIS — Z12.39 BREAST CANCER SCREENING, HIGH RISK PATIENT: Primary | ICD-10-CM

## 2022-08-26 DIAGNOSIS — Z12.39 BREAST CANCER SCREENING, HIGH RISK PATIENT: ICD-10-CM

## 2022-08-26 PROCEDURE — 99999 PR PBB SHADOW E&M-EST. PATIENT-LVL II: CPT | Mod: PBBFAC,,, | Performed by: SURGERY

## 2022-08-26 PROCEDURE — 99205 OFFICE O/P NEW HI 60 MIN: CPT | Mod: S$GLB,,, | Performed by: SURGERY

## 2022-08-26 PROCEDURE — 99999 PR PBB SHADOW E&M-EST. PATIENT-LVL II: ICD-10-PCS | Mod: PBBFAC,,, | Performed by: SURGERY

## 2022-08-26 PROCEDURE — 99205 PR OFFICE/OUTPT VISIT, NEW, LEVL V, 60-74 MIN: ICD-10-PCS | Mod: S$GLB,,, | Performed by: SURGERY

## 2022-08-26 NOTE — PROGRESS NOTES
Breast Surgical Oncology  [place]  [facility]        PCP:  HA Hunter MD  Date of Service: 2022    CHIEF COMPLAINT:   At high-risk for breast cancer    DIAGNOSIS:   Paty Parham is a 42 y.o. female who is kindly referred by Dr. Hunter for evaluation of increased risk of breast cancer based on family history.     She was identified for high risk evaluation by her GYN Dr. Castelan in , for which she was entered into a high risk clinic at [facility].  She began screening mammograms at age 40. At which time, a benign mass in her right breast at 3:00 4 CMFN was identified. This was followed with 3D mammo/US at a 6 month interval and has been stable. She underwent high risk screening breast MRI in  which showed the right breast benign mass at 3:00 4 CMFN and a 0.5 cm mass in the upper outer quadrant right breast, both with with benign kinetics. Annual MRI in  revealed a new lower inner quadrant mass, confirmed biopsy + fibroadenoma.    She currently is of a healthy weight, does not drink alcohol.     Her breast cancer risk factor profile is as follows: Menarche at 10, Menopause at N/A.  She is .  HRT: no    Other breast cancer risk factors include family hx on father's side, nulliparous, menarche before age 12, family hx of ovarian CA and family hx of Pancreatic CA. Her family history cancer profile is: paternal grandmother with breast cancer at age 42 and ovarian cancer at age 89, currently living; maternal grandmother with pancreatic cancer at age 81,  at age 81; paternal aunt with melanoma at age 55, currently living.      FAMILY HISTORY:     Family History   Problem Relation Age of Onset    Hypothyroidism Mother     Hyperparathyroidism Mother     Sjogren's syndrome Mother     Ankylosing spondylitis Mother     Sarcoidosis Mother     ADD / ADHD Brother     Hypertension Brother     Hyperlipidemia Brother         PAST MEDICAL HISTORY:      Past Medical History:   Diagnosis Date    Depression     Digestive disorder     Hypertension     Hypothyroidism     Meniere's disease, left ear     Meningioma, left temporal region 6/24/2020    NEUROSURGEON: Dr. Zhu NEUROLOGIST: Dr. Satya Carias    Moderate mixed hyperlipidemia not requiring statin therapy 6/24/2020    Plantar fascial fibromatosis     PONV (postoperative nausea and vomiting)     Recurrent suppurative otitis media of right ear with spontaneous tympanic membrane rupture     Vertigo        SURGICAL HISTORY:     Past Surgical History:   Procedure Laterality Date    CHOLECYSTECTOMY      epidural steroid injection      ESOPHAGOGASTRODUODENOSCOPY N/A 5/3/2021    Procedure: ESOPHAGOGASTRODUODENOSCOPY (EGD);  Surgeon: Mirian Hernandez MD;  [facility];  Service: Endoscopy;  Laterality: N/A;    WISDOM TOOTH EXTRACTION      X 1       SOCIAL HISTORY:     Social History     Tobacco Use    Smoking status: Never Smoker    Smokeless tobacco: Never Used   Substance Use Topics    Alcohol use: Yes     Comment: <3 drinks per week.      Drug use: No        MEDICATIONS/ALLERGIES:     Current Outpatient Medications   Medication Instructions    buPROPion (WELLBUTRIN XL) 300 mg, Oral, Daily    clonazePAM (KLONOPIN) 0.25-0.5 mg, Oral, Nightly PRN    levothyroxine (SYNTHROID) 50 mcg, Oral, Before breakfast    meclizine (ANTIVERT) 25 mg, Oral, 3 times daily PRN    norgestrel-ethinyl estradioL (LO/OVRAL) 0.3-30 mg-mcg per tablet 1 tablet, Oral, Daily    potassium chloride SA (K-DUR,KLOR-CON) 20 MEQ tablet 20 mEq, Oral, 2 times daily    topiramate (TOPAMAX) 50 mg, Oral, Daily       Review of patient's allergies indicates:   Allergen Reactions    Wasp venom Rash     Mental confusion.       REVIEW OF SYSTEMS:   I have reviewed 12 systems, including 2 points per system. Pertinent positives reported are: anxiety & vertigo.    PHYSICAL EXAM:   General: The patient appears well and is in  no acute distress.     BREAST EXAM  No Asymmetry  Right:  - Mass: No  - Skin change: No  - Nipple Discharge: No  - Nipple retraction: No  - Axillary LAD: No  Left:   - Mass: No  - Skin change: No  - Nipple Discharge: No  - Nipple retraction: No  - Axillary LAD: No    IMAGING:   Imaging reports reviewed personally. Image disk provided to our radiology department.    3/18/21: MRI breast: no report obtained.   8/11/21: Diagnostic 3D Mammo: Breast tissues is heterogeneously dense. Benign calcifications are seen. The mammographic appearance of both breasts has remained stable, and no definite mammographic signs of breast neoplasm are seen.   8/11/21: US Limited Right: 0.5 cm hypoechoic mass in medial right breast at 3:00 position 4 CMFN has remained stable since 3/30/21 and most likely represents a fibroadenoma. 6 month follow up targeted sonogram is recommended to document stability over 1 year interval.  3/22/22: US Limited Right: Stable benign appearing breast mass at 3:00 position 4 CMFN dating back to at least 2020. Annual mammogram is recommended and will be due in August 2022.  3/23/22: MRI breast: 0.5 x 0.3 x 0.3 cm oval, circumscribed, enhancing mass with plateau kinetics in the lower inner aspect of the right breast. A focal clumped non-mass enhancement with persistent kinetics measuring 0.5 x 0.3 x 0.2 cm in the upper outer aspect of the right breast is grossly unchanged compared with MRI from 3/18/21 and probably benign. A 0.5 cm enhancing mass in the right breast 3:00 4 CMFN corresponds with sonographically stable hypoechoic circumscribed mass dating back to 2020, benign. MRI guided biopsy is recommended for the lower inner aspect mass, and annual MRI is recommended for the non-mass enhancement in upper outer aspect of right breast to assess/complete 2 year stability.  4/4/22: MRI CNB: Lower inner right breast mass.       PATHOLOGY:   4/5/22: Right breast MRI biopsy - Fibroadenoma, fibrocystic change  including stromal fibrosis and microcysts with apocrine metaplasia.     ASSESSMENT:     1. Breast cancer screening, high risk patient          PLAN:   Paty Parham is a 42 y.o. female who presents for evaluation in the high risk program.  She was identified for the program by PCP due to having a elevated score by a risk assessment model and family history of breast or ovarian cancer.  Her lifetime risk for the development of breast cancer by the Tyrer Cuzick model is >20%.  Therefore, she meets criteria to be followed and screened as a high risk patient.      We reviewed the NCCN guidelines for high risk women to be the followin. Twice annual clinical breast exam  2. Screening mammogram beginning 10 years earlier than the youngest affected family member  3. Consideration of supplemental annual imaging alternating with her mammogram on the 6 month interval. We discussed that the guidelines currently include breast MRI as the supplemental imaging option.   4. Adopting risk-reduction strategies and   5. Consideration of chemoprevention.      Her individualized plan is the followin. She will see me each August for a clinical breast exam and see Dr. Castelan each January for her second annual clinical breast exam.    2. She will have her mammogram each September.  Diagnostic 3D mammogram and ultrasound have been ordered at our imaging center for September at the patient's request. Her imaging and reports were provided to the radiology department.   3. She would like to proceed with bilateral diagnostic 3D mammogram.  This will be done in September. We discussed performing her Breast MRI in April as recommended.   4. Regarding risk reduction, she is recommended to maintain a healthy weight (BMI 18-25), regular aerobic exercise (at least 150 minutes/week) and balanced healthy diet (high in vegetables, fruits, and whole grains) and avoidance of red meats, processed foods, & refined sugars. .     5. We discussed the findings of the NSABP Prevention One trial and the potential side effects of tamoxifen. She is not interested in chemoprevention at this time.  and She has been mailed the [employer] FACTSHEET on Tamoxifen to review. .    Paty Parham has a normal breast exam today. We discussed the possible signs and symptoms of breast cancer as lump, masses, new asymmetries, skin changes and nipple changes. She is encouraged to contact me if any new breast concerns arise.  She has been provided a handout that details today's discussion and her plan.     60 minutes were spent on the encounter, including record review and documentation.       Elana Delarosa MD

## 2022-09-15 ENCOUNTER — HOSPITAL ENCOUNTER (OUTPATIENT)
Dept: RADIOLOGY | Facility: HOSPITAL | Age: 42
Discharge: HOME | End: 2022-09-15
Attending: SURGERY
Payer: OTHER GOVERNMENT

## 2022-09-15 VITALS — HEIGHT: 63 IN | BODY MASS INDEX: 28.13 KG/M2 | WEIGHT: 158.75 LBS

## 2022-09-15 DIAGNOSIS — R92.8 ABNORMAL MAMMOGRAM: ICD-10-CM

## 2022-09-15 DIAGNOSIS — Z12.39 BREAST CANCER SCREENING, HIGH RISK PATIENT: ICD-10-CM

## 2022-09-15 PROCEDURE — 77066 DX MAMMO INCL CAD BI: CPT | Mod: TC

## 2022-09-15 PROCEDURE — 77062 MAMMO DIGITAL DIAGNOSTIC BILAT WITH TOMO: ICD-10-PCS | Mod: 26,,, | Performed by: RADIOLOGY

## 2022-09-15 PROCEDURE — 76642 ULTRASOUND BREAST LIMITED: CPT | Mod: TC,RT

## 2022-09-15 PROCEDURE — 76642 US BREAST RIGHT LIMITED: ICD-10-PCS | Mod: 26,RT,, | Performed by: RADIOLOGY

## 2022-09-15 PROCEDURE — 76642 ULTRASOUND BREAST LIMITED: CPT | Mod: 26,RT,, | Performed by: RADIOLOGY

## 2022-09-15 PROCEDURE — 77066 DX MAMMO INCL CAD BI: CPT | Mod: 26,,, | Performed by: RADIOLOGY

## 2022-09-15 PROCEDURE — 77066 MAMMO DIGITAL DIAGNOSTIC BILAT WITH TOMO: ICD-10-PCS | Mod: 26,,, | Performed by: RADIOLOGY

## 2022-09-15 PROCEDURE — 77062 BREAST TOMOSYNTHESIS BI: CPT | Mod: 26,,, | Performed by: RADIOLOGY

## 2022-09-16 ENCOUNTER — TELEPHONE (OUTPATIENT)
Dept: SURGERY | Facility: CLINIC | Age: 42
End: 2022-09-16
Payer: OTHER GOVERNMENT

## 2022-09-19 DIAGNOSIS — Z91.89 AT HIGH RISK FOR BREAST CANCER: Primary | ICD-10-CM

## 2022-10-11 DIAGNOSIS — E87.6 HYPOKALEMIA: ICD-10-CM

## 2022-10-11 RX ORDER — POTASSIUM CHLORIDE 20 MEQ/1
TABLET, EXTENDED RELEASE ORAL
Qty: 180 TABLET | Refills: 0 | Status: SHIPPED | OUTPATIENT
Start: 2022-10-11 | End: 2023-01-05 | Stop reason: SDUPTHER

## 2022-10-11 NOTE — TELEPHONE ENCOUNTER
No new care gaps identified.  [facility] Embedded Care Gaps. Reference number: [id number]. 10/11/2022   12:03:49 PM CDT

## 2022-10-11 NOTE — TELEPHONE ENCOUNTER
Refill Decision Note   Paty Parham  is requesting a refill authorization.  Brief Assessment and Rationale for Refill:  Approve     Medication Therapy Plan:       Medication Reconciliation Completed: No   Comments:     No Care Gaps recommended.     Note composed:4:24 PM 10/11/2022

## 2022-11-01 ENCOUNTER — PATIENT MESSAGE (OUTPATIENT)
Dept: INTERNAL MEDICINE | Facility: CLINIC | Age: 42
End: 2022-11-01

## 2022-11-01 ENCOUNTER — OFFICE VISIT (OUTPATIENT)
Dept: INTERNAL MEDICINE | Facility: CLINIC | Age: 42
End: 2022-11-01
Payer: OTHER GOVERNMENT

## 2022-11-01 VITALS — SYSTOLIC BLOOD PRESSURE: 104 MMHG | DIASTOLIC BLOOD PRESSURE: 82 MMHG

## 2022-11-01 DIAGNOSIS — E06.3 HYPOTHYROIDISM DUE TO HASHIMOTO'S THYROIDITIS: Chronic | ICD-10-CM

## 2022-11-01 DIAGNOSIS — E03.8 HYPOTHYROIDISM DUE TO HASHIMOTO'S THYROIDITIS: Chronic | ICD-10-CM

## 2022-11-01 DIAGNOSIS — F33.0 RECURRENT MILD MAJOR DEPRESSIVE DISORDER WITH ANXIETY: Primary | ICD-10-CM

## 2022-11-01 DIAGNOSIS — F41.9 RECURRENT MILD MAJOR DEPRESSIVE DISORDER WITH ANXIETY: Primary | ICD-10-CM

## 2022-11-01 DIAGNOSIS — F41.1 GENERALIZED ANXIETY DISORDER: Chronic | ICD-10-CM

## 2022-11-01 PROCEDURE — 99214 OFFICE O/P EST MOD 30 MIN: CPT | Mod: 95,,, | Performed by: FAMILY MEDICINE

## 2022-11-01 PROCEDURE — 99214 PR OFFICE/OUTPT VISIT, EST, LEVL IV, 30-39 MIN: ICD-10-PCS | Mod: 95,,, | Performed by: FAMILY MEDICINE

## 2022-11-01 RX ORDER — SERTRALINE HYDROCHLORIDE 50 MG/1
25-50 TABLET, FILM COATED ORAL DAILY
Qty: 30 TABLET | Refills: 1 | Status: SHIPPED | OUTPATIENT
Start: 2022-11-01 | End: 2022-12-08 | Stop reason: SDUPTHER

## 2022-11-01 NOTE — ASSESSMENT & PLAN NOTE
Lab Results   Component Value Date    TSH 2.262 03/03/2022    TSH 1.980 04/15/2021    TSH 2.043 06/24/2020    FREET4 1.07 03/03/2022

## 2022-11-01 NOTE — ASSESSMENT & PLAN NOTE
Uses clonazepam sparingly.  Review of [facility] Controlled Prescription Drug Monitoring database shows the following prescriptions (sorted by date filled).    10/22/2022 - Clonazepam 0.5 Mg Tablet, QTY 30 (QS for 30 days), originally written 08/19/2022 by Laura Lerner.    09/13/2022 - Clonazepam 0.5 Mg Tablet, QTY 30 (QS for 30 days), originally written 08/19/2022 by Laura Lerner.    08/06/2022 - Clonazepam 0.5 Mg Tablet, QTY 30 (QS for 30 days), originally written 03/03/2022 by Laura Lerner.    06/18/2022 - Clonazepam 0.5 Mg Tablet, QTY 30 (QS for 30 days), originally written 03/03/2022 by Laura Lerner.    04/21/2022 - Clonazepam 0.5 Mg Tablet, QTY 30 (QS for 30 days), originally written 03/03/2022 by Laura Lerner.    03/03/2022 - Clonazepam 0.5 Mg Tablet, QTY 30 (QS for 30 days), originally written 03/03/2022 by Laura Lerner.

## 2022-11-01 NOTE — PATIENT INSTRUCTIONS
"Paty Duque.    It was good to see you again during your virtual visit today.    I'm sorry you are feeling down. I'm confident we can get you feeling better.    Here's a summary of the plan we discussed:  Continue bupropion 300 mg.  Start sertraline 25 mg (0.5 x 50 mg tablet).  Monitor for side-effects. If tolerating well, may increase to 50 mg dose after a couple of weeks.  Re-evaluate via virtual visit in mid-December.    I also added:  Re-check TSH.    Please click on the accompanying link to schedule your virtual visit with me in mid-December.  You can schedule your lab (TSH) by calling our appointment desk at [phone]. If you have any difficulty, send my staff a message, and they will be glad to help.     Thanks for letting me care for you, and thanks for trusting [facility] with your healthcare needs.    All the best,    JOSSELIN Hunter MD   P.S. Don't forget to review your After Visit Summary from your [employer] [facility]. (Just click on "Appointments," choose the appropriate past appointment, and click on "View After Visit Summary.")    Lab Orders Placed This Encounter   Lab    TSH     Medications Ordered This Encounter   Medications    sertraline (ZOLOFT) 50 MG tablet     Sig: Take 0.5-1 tablets (25-50 mg total) by mouth once daily.     Dispense:  30 tablet     Refill:  1     "

## 2022-11-01 NOTE — ASSESSMENT & PLAN NOTE
This is a chronic problem, with exacerbation or progression.  Depression exacerbation over last few months, with depressed mood, anhedonia, crying episodes, psychomotor agitation, insomnia described as waking up 3-4 am, unable to get back to sleep. She reports subjective weight loss. (Hasn't weight self, but she agreed to buy scale.) Seeing Lisa Gallego LCSW regularly.  Dx with depression first in 1994. Treated with counseling along. Dx again in 2008 and treated with Bupropion 300 mg, which she has been on since then. 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 She has tried no other antidepressants.  We discussed risks and benefits of treatment options.  PLAN: Continue bupropion 300 mg. Start sertraline 25 mg (0.5 x 50 mg tablet). Monitor for side-effects. If tolerating well, she may increase to 50 mg dose after a couple of weeks. Re-evaluate via virtual visit in mid-December.

## 2022-11-01 NOTE — PROGRESS NOTES
TELEMEDICINE VIRTUAL VISIT  11/1/22  2:00 PM CDT    Visit Details: This visit was a telemedicine virtual visit with synchronous audio and video. Paty represented that her location at the time of this visit was in the [facility]. Paty chose and consented to receive these medical services by telemedicine.    CHIEF COMPLAINT: Depression    ASSESSMENT & PLAN  1. Major depressive disorder, recurrent, mild-moderate, with anxiety  Assessment & Plan:  This is a chronic problem, with exacerbation or progression.  Depression exacerbation over last few months, with depressed mood, anhedonia, crying episodes, psychomotor agitation, insomnia described as waking up 3-4 am, unable to get back to sleep. She reports subjective weight loss. (Hasn't weight self, but she agreed to buy scale.) Seeing Lisa Gallego LCSW regularly.  Dx with depression first in 1994. Treated with counseling along. Dx again in 2008 and treated with Bupropion 300 mg, which she has been on since then. 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 She has tried no other antidepressants.  We discussed risks and benefits of treatment options.  PLAN: Continue bupropion 300 mg. Start sertraline 25 mg (0.5 x 50 mg tablet). Monitor for side-effects. If tolerating well, she may increase to 50 mg dose after a couple of weeks. Re-evaluate via virtual visit in mid-December.    Orders:  -     sertraline (ZOLOFT) 50 MG tablet; Take 0.5-1 tablets (25-50 mg total) by mouth once daily.  Dispense: 30 tablet; Refill: 1    2. Hypothyroidism due to Hashimoto's thyroiditis  Assessment & Plan:  Lab Results   Component Value Date    TSH 2.262 03/03/2022    TSH 1.980 04/15/2021    TSH 2.043 06/24/2020    FREET4 1.07 03/03/2022       Orders:  -     TSH; Future; Expected date: 11/01/2022    3. Generalized anxiety disorder  Assessment & Plan:  Uses clonazepam sparingly.  Review of [place]  [facility] Controlled Prescription Drug Monitoring database shows the following prescriptions (sorted by date filled).    10/22/2022 - Clonazepam 0.5 Mg Tablet, QTY 30 (QS for 30 days), originally written 08/19/2022 by Laura Lerner.    09/13/2022 - Clonazepam 0.5 Mg Tablet, QTY 30 (QS for 30 days), originally written 08/19/2022 by Laura Lerner.    08/06/2022 - Clonazepam 0.5 Mg Tablet, QTY 30 (QS for 30 days), originally written 03/03/2022 by Laura Lerner.    06/18/2022 - Clonazepam 0.5 Mg Tablet, QTY 30 (QS for 30 days), originally written 03/03/2022 by Laura Lerner.    04/21/2022 - Clonazepam 0.5 Mg Tablet, QTY 30 (QS for 30 days), originally written 03/03/2022 by Laura Lerner.    03/03/2022 - Clonazepam 0.5 Mg Tablet, QTY 30 (QS for 30 days), originally written 03/03/2022 by Laura Lerner.      Orders Placed This Encounter    TSH    sertraline (ZOLOFT) 50 MG tablet     TOTAL TIME evaluating and managing this patient for this encounter was greater than or equal to 39 minutes. This time was spent personally by me on the following activities: review of patient's past medical history, assessing age-appropriate health maintenance needs, review of any interval history, medication reconciliation, obtaining history from the patient, examination of the patient, review and interpretation of lab results, evaluation of the patient's response to treatment, review of [place] [employer] Controlled Prescription Drug Monitoring database records for the patient, managing and/or ordering prescription medications, explaining rationale for medication change(s) and answering patient's questions, ordering labs, obtaining additional history from her LCSW, educating patient and answering their questions about treatment plan, goals of treatment, and follow-up, and final documentation of the visit. This time was exclusive of any separately billable procedures for this patient and exclusive of time spent treating any other patients.   Unless noted herein, any  chronic conditions are represented as and appear compensated/controlled and stable, and no other significant complaints or concerns were reported.    Follow up in about 6 weeks (around 12/14/2022) for re-evaluate problem(s) discussed today.   Future Appointments   Date Time Provider Department Center   2/10/2023  7:45 AM LABORATORY, [facility] LAB [facility]   2/17/2023  9:00 AM JOSSELIN Hunter MD [facility] IM [facility]   4/19/2023 10:00 AM LABORATORY, [facility] LAB [facility]   4/19/2023 11:00 AM [facility]   4/19/2023  1:00 PM Katelynn Wu, NP [facility]   8/25/2023  9:30 AM Elana Delarosa MD [facility]       PRESCRIPTION DRUG MANAGEMENT  Outpatient Medications Prior to Visit   Medication Sig Dispense Refill    buPROPion (WELLBUTRIN XL) 300 MG 24 hr tablet Take 1 tablet (300 mg total) by mouth once daily. 90 tablet 3    clonazePAM (KLONOPIN) 0.5 MG tablet Take 0.5-1 tablets (0.25-0.5 mg total) by mouth nightly as needed (for anxiety or insomnia). 30 tablet 5    levothyroxine (SYNTHROID) 50 MCG tablet Take 1 tablet (50 mcg total) by mouth before breakfast. 90 tablet 3    meclizine (ANTIVERT) 25 mg tablet Take 1 tablet (25 mg total) by mouth 3 (three) times daily as needed for Dizziness. 90 tablet 3    norgestrel-ethinyl estradioL (LO/OVRAL) 0.3-30 mg-mcg per tablet Take 1 tablet by mouth once daily.      potassium chloride SA (K-DUR,KLOR-CON) 20 MEQ tablet TAKE 1 TABLET(20 MEQ) BY MOUTH TWICE DAILY 180 tablet 0    topiramate (TOPAMAX) 50 MG tablet Take 50 mg by mouth once daily.       No facility-administered medications prior to visit.   There are no discontinued medications.  Medications Ordered This Encounter   Medications    sertraline (ZOLOFT) 50 MG tablet     Sig: Take 0.5-1 tablets (25-50 mg total) by mouth once daily.     Dispense:  30 tablet     Refill:  1     Review of Systems   Constitutional:  Positive for activity change and unexpected weight change.    HENT:  Negative for hearing loss, rhinorrhea and trouble swallowing.    Eyes:  Negative for discharge and visual disturbance.   Respiratory:  Negative for chest tightness and wheezing.    Cardiovascular:  Negative for chest pain and palpitations.   Gastrointestinal:  Negative for blood in stool, constipation, diarrhea and vomiting.   Endocrine: Negative for polydipsia and polyuria.   Genitourinary:  Negative for difficulty urinating, dysuria, hematuria and menstrual problem.   Musculoskeletal:  Negative for arthralgias, joint swelling and neck pain.   Neurological:  Negative for weakness and headaches.   Psychiatric/Behavioral:  Positive for dysphoric mood. Negative for confusion and suicidal ideas.    PHYSICAL EXAM  CONSTITUTIONAL: No apparent distress. Appears comfortable. Does not appear acutely ill or septic. Appears adequately hydrated.  PULMONARY: Breathing unlabored. No audible wheezes. Easily speaks in full sentences.  PSYCHIATRIC: Alert and conversant and grossly oriented. Mood is grossly neutral. Affect appropriate. Judgment and insight grossly intact.    Orders Placed This Encounter    TSH    sertraline (ZOLOFT) 50 MG tablet     TOTAL TIME evaluating and managing this patient for this encounter was greater than or equal to 39 minutes. This time was spent personally by me on the following activities: review of patient's past medical history, review of any interval history, medication reconciliation, obtaining history from the patient, examination of the patient, obtaining additional history from her LCSW, review and interpretation of lab results, evaluation of the patient's response to treatment, review of [place] [employer] of [employer] Controlled Prescription Drug Monitoring database records for the patient, managing and/or ordering prescription medications, explaining rationale for medication change(s) and answering patient's questions, medication counseling, ordering labs, educating patient and answering  "their questions about treatment plan, goals of treatment, and follow-up, and final documentation of the visit. This time was exclusive of any separately billable procedures for this patient and exclusive of time spent treating any other patients.    Documentation entered by me for this encounter may have been done in part using speech-recognition technology. Although I have made an effort to ensure accuracy, "sound like" errors may exist and should be interpreted in context.    Each patient to whom medical services are provided by telemedicine is: (1) informed of the relationship between the physician and patient and the respective role of any other health care provider with respect to management of the patient; and (2) notified that he or she may decline to receive medical services by telemedicine and may withdraw from such care at any time.   "

## 2022-11-01 NOTE — ASSESSMENT & PLAN NOTE
This is a chronic problem, with exacerbation or progression.  Depression exacerbation over last few months, with depressed mood, anhedonia, crying episodes, psychomotor agitation, insomnia described as waking up 3-4 am, unable to get back to sleep. She reports subjective weight loss. (Hasn't weight self, but she agreed to buy scale.) Seeing Lisa Gallego LCSW regularly.  Dx with depression first in 1994. Treated with counseling along. Dx again in 2008 and treated with Bupropion 300 mg, which she has been on since then. 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 She has tried no other antidepressants.  We discussed risks and benefits of treatment options.  PLAN: Start sertraline 25 mg (0.5 x 50 mg tablet). Monitor for side-effects. If tolerating well, she may increase to 50 mg dose after a couple of weeks. Re-evaluate via virtual visit in mid-December.          Sertraline 50 but take only 1/2 tablet.

## 2022-11-02 ENCOUNTER — TELEPHONE (OUTPATIENT)
Dept: INTERNAL MEDICINE | Facility: CLINIC | Age: 42
End: 2022-11-02
Payer: OTHER GOVERNMENT

## 2022-12-02 ENCOUNTER — OFFICE VISIT (OUTPATIENT)
Dept: SURGERY | Facility: CLINIC | Age: 42
End: 2022-12-02
Payer: OTHER GOVERNMENT

## 2022-12-02 DIAGNOSIS — R92.30 DENSE BREAST TISSUE: ICD-10-CM

## 2022-12-02 DIAGNOSIS — Z91.89 AT HIGH RISK FOR BREAST CANCER: ICD-10-CM

## 2022-12-02 PROCEDURE — 99214 OFFICE O/P EST MOD 30 MIN: CPT | Mod: S$GLB,,, | Performed by: SURGERY

## 2022-12-02 PROCEDURE — 99214 PR OFFICE/OUTPT VISIT, EST, LEVL IV, 30-39 MIN: ICD-10-PCS | Mod: S$GLB,,, | Performed by: SURGERY

## 2022-12-02 NOTE — PROGRESS NOTES
Breast Surgery  Department of General Surgery   [facility]    Date of Service: 12/02/2022    DIAGNOSIS:   This is a 42 y.o. female with lifetime risk of breast cancer >20%.   Genetic testing: none    HISTORY OF PRESENT ILLNESS:   Paty Parham is a 42 y.o. female who is established in our high risk breast program comes in with complaints of a new right breast abnormality.  She reports a change in her breast self-exam for 3 weeks now. She feels a hardening of the upper outer quadrant of her right breast. She also feels associated right axillary fullness. She specifically denies skin or nipple changes, or nipple discharge. She has a history of stable fibroadenomas within the right breast at 3:00 and UOQ (kinetics on MRI consistent with benign masses in 2021).     IMAGING:   MRI: due in April 2023.  MMG:   Results for orders placed during the hospital encounter of 09/15/22    Mammo Digital Diagnostic Bilat with Arnav    Narrative  Result:  Mammo Digital Diagnostic Bilat with Arnav  US Breast Right Limited    History:  Patient is 42 y.o. and is seen for diagnostic imaging.    Films Compared:  Prior images (if available) were compared.    Findings:  This procedure was performed using tomosynthesis. Computer-aided detection was utilized in the interpretation of this examination.  The breasts are heterogeneously dense, which may obscure small masses. There is no evidence of suspicious masses, calcifications, or other abnormal findings.      Ultrasound was performed of the right breast, 03:00 o'clock 4 cm from the nipple and demonstrates stable 5 mm benign mass, likely fibroadenoma.    Impression  Bilateral  There is no mammographic or sonographic evidence of malignancy.    BI-RADS Category:  Overall: 2 - Benign    Recommendation:  Routine screening mammogram in 1 year is recommended.      Your estimated lifetime risk of breast cancer (to age 85) based on Tyrer-Cuzick risk assessment model is Tyrer-Cuzick: 25.18 %.  According to the American Cancer Society, patients with a lifetime breast cancer risk of 20% or higher might benefit from supplemental screening tests.      PHYSICAL EXAM:   General: The patient appears well and is in no acute distress.   BREAST EXAM  No Asymmetry  Right:  - Mass: No  - Skin change: No  - Nipple Discharge: No  - Nipple retraction: No  - Axillary LAD: No  Left:   - Mass: No  - Skin change: No  - Nipple Discharge: No  - Nipple retraction: No  - Axillary LAD: No    BREAST ULTRASOUND PROCEDURE    Focused sonography of the upper outer quadrant of the RIGHT breast was performed in 2 views, radial and antiradial. Dense tissue with classic honeycomb pattern was identified throughout the upper outer quadrant and focally at 10:005 cm from the nipple position. The findings are most consistent with dense breast tissue. No masses were identified on examination.   BI-RADS 2.      ASSESSMENT:   This is a 42 y.o. female at high risk of breast cancer      PLAN:   Paty Parham has a normal breast exam today. Reassurance was given. Her palpable finding is consistent with dense breast tissue.   Continue self breast awareness. We discussed the possible signs and symptoms of breast cancer as lump, masses, new asymmetries, skin changes and nipple changes. She is encouraged to contact me if any new breast concerns arise.  Her supplemental imaging plan is MRI in April coordinated with her return visit.   Mammogram in September.  Return to clinic in April. The patient is in agreement with the plan. Questions were encouraged and answered to patient's satisfaction. Paty will call our office with any questions or concerns.     I spent a total of 30 minutes on this visit. This includes face to face time and non-face to face time preparing to see the patient (eg, review of tests), obtaining and/or reviewing separately obtained history, documenting clinical information in the electronic or other health record, independently  interpreting results and communicating results to the patient/family/caregiver, or care coordinator.      Elana Delarosa

## 2022-12-08 ENCOUNTER — OFFICE VISIT (OUTPATIENT)
Dept: INTERNAL MEDICINE | Facility: CLINIC | Age: 42
End: 2022-12-08
Payer: OTHER GOVERNMENT

## 2022-12-08 VITALS — SYSTOLIC BLOOD PRESSURE: 104 MMHG | DIASTOLIC BLOOD PRESSURE: 82 MMHG

## 2022-12-08 DIAGNOSIS — F41.1 GENERALIZED ANXIETY DISORDER: Chronic | ICD-10-CM

## 2022-12-08 DIAGNOSIS — E03.8 HYPOTHYROIDISM DUE TO HASHIMOTO'S THYROIDITIS: Chronic | ICD-10-CM

## 2022-12-08 DIAGNOSIS — E78.2 MODERATE MIXED HYPERLIPIDEMIA NOT REQUIRING STATIN THERAPY: Chronic | ICD-10-CM

## 2022-12-08 DIAGNOSIS — F33.0 RECURRENT MILD MAJOR DEPRESSIVE DISORDER WITH ANXIETY: Primary | ICD-10-CM

## 2022-12-08 DIAGNOSIS — Z91.89 AT HIGH RISK FOR BREAST CANCER: Chronic | ICD-10-CM

## 2022-12-08 DIAGNOSIS — E06.3 HYPOTHYROIDISM DUE TO HASHIMOTO'S THYROIDITIS: Chronic | ICD-10-CM

## 2022-12-08 DIAGNOSIS — F41.9 RECURRENT MILD MAJOR DEPRESSIVE DISORDER WITH ANXIETY: Primary | ICD-10-CM

## 2022-12-08 PROCEDURE — 99214 PR OFFICE/OUTPT VISIT, EST, LEVL IV, 30-39 MIN: ICD-10-PCS | Mod: 95,,, | Performed by: FAMILY MEDICINE

## 2022-12-08 PROCEDURE — 99214 OFFICE O/P EST MOD 30 MIN: CPT | Mod: 95,,, | Performed by: FAMILY MEDICINE

## 2022-12-08 RX ORDER — SERTRALINE HYDROCHLORIDE 50 MG/1
50 TABLET, FILM COATED ORAL DAILY
Qty: 90 TABLET | Refills: 3 | Status: SHIPPED | OUTPATIENT
Start: 2022-12-08 | End: 2023-01-05 | Stop reason: SDUPTHER

## 2022-12-08 RX ORDER — CLONAZEPAM 0.5 MG/1
.25-.5 TABLET ORAL NIGHTLY PRN
Qty: 30 TABLET | Refills: 5 | Status: SHIPPED | OUTPATIENT
Start: 2022-12-08 | End: 2023-01-05 | Stop reason: SDUPTHER

## 2022-12-08 NOTE — PROGRESS NOTES
TELEMEDICINE VIRTUAL VISIT  12/8/22  7:00 AM CST    Visit Details: This visit was a telemedicine virtual visit with synchronous audio and video. Paty represented that her location at the time of this visit was in the [facility]. Paty chose and consented to receive these medical services by telemedicine.    CHIEF COMPLAINT: Follow-up and Medication Refill    Paty reports that her chronic conditions are improved or at least stable. She says she is doing well on her current medicines and wants to continue current treatment.     ASSESSMENT & PLAN  1. Major depressive disorder, recurrent, mild-moderate, with anxiety  Assessment & Plan:  Paty reports she is doing well on her sertraline 50 mg QD, bupropion 300 mg QD, and clonazepam 0.25-0.5mg QHS PRN. Paty reports preceiving no adverse side-effects and wants to continue current treatment.     Orders:  -     sertraline (ZOLOFT) 50 MG tablet; Take 1 tablet (50 mg total) by mouth once daily.  Dispense: 90 tablet; Refill: 3    2. Generalized anxiety disorder  -     clonazePAM (KLONOPIN) 0.5 MG tablet; Take 0.5-1 tablets (0.25-0.5 mg total) by mouth nightly as needed (for anxiety or insomnia).  Dispense: 30 tablet; Refill: 5    3. Moderate mixed hyperlipidemia not requiring statin therapy  Assessment & Plan:  Lab Results   Component Value Date    CHOL 217 (H) 03/03/2022    CHOL 236 (H) 06/24/2020    TRIG 121 03/03/2022    TRIG 140 06/24/2020    HDL 48 03/03/2022    HDL 54 06/24/2020    LDLCALC 144.8 03/03/2022    LDLCALC 154.0 06/24/2020    NONHDLCHOL 169 03/03/2022    NONHDLCHOL 182 06/24/2020    AST 22 03/03/2022    ALT 11 03/03/2022     The 10-year ASCVD risk score (Anjum QUEZADA, et al., 2019) is: 0.7%    Values used to calculate the score:      Age: 42 years      Sex: Female      Is Non- : No      Diabetic: No      Tobacco smoker: No      Systolic Blood Pressure: 104 mmHg      Is BP treated: No      HDL Cholesterol: 48 mg/dL      Total Cholesterol:  217 mg/dL       4. Hypothyroidism due to Hashimoto's thyroiditis  Assessment & Plan:  Lab Results   Component Value Date    TSH 2.262 03/03/2022    TSH 1.980 04/15/2021    TSH 2.043 06/24/2020    FREET4 1.07 03/03/2022     No results found for: THYROIDSTIMI, THYROPEROXID, THYGLBTUM, THGABSCRN, THYRGINTERP       5. At increased risk for breast cancer  Assessment & Plan:  Screening MRI ordered by Dr. Delarosa.    Unless noted herein, any chronic conditions are represented as and appear compensated/controlled and stable, and no other significant complaints or concerns were reported.    Future Appointments   Date Time Provider Department Center   12/8/2022  7:00 AM Lucio Hunter MD [facility]   2/10/2023  7:45 AM LABORATORY, [facility]   2/17/2023  9:00 AM Lucio Hunter MD [facility]   4/19/2023 10:00 AM LABORATORY, [facility]   4/19/2023 11:00 AM [facility] MRI [facility]   4/20/2023  3:40 PM Elana Delarosa MD [facility]   8/25/2023  9:30 AM Elana Delarosa MD [facility]       PRESCRIPTION DRUG MANAGEMENT  Outpatient Medications Prior to Visit   Medication Sig Dispense Refill    buPROPion (WELLBUTRIN XL) 300 MG 24 hr tablet Take 1 tablet (300 mg total) by mouth once daily. 90 tablet 3    levothyroxine (SYNTHROID) 50 MCG tablet Take 1 tablet (50 mcg total) by mouth before breakfast. 90 tablet 3    meclizine (ANTIVERT) 25 mg tablet Take 1 tablet (25 mg total) by mouth 3 (three) times daily as needed for Dizziness. 90 tablet 3    norgestrel-ethinyl estradioL (LO/OVRAL) 0.3-30 mg-mcg per tablet Take 1 tablet by mouth once daily.      potassium chloride SA (K-DUR,KLOR-CON) 20 MEQ tablet TAKE 1 TABLET(20 MEQ) BY MOUTH TWICE DAILY 180 tablet 0    topiramate (TOPAMAX) 50 MG tablet Take 50 mg by mouth once daily.      clonazePAM (KLONOPIN) 0.5 MG tablet Take 0.5-1 tablets (0.25-0.5 mg total) by mouth nightly as needed (for anxiety or  insomnia). 30 tablet 5    sertraline (ZOLOFT) 50 MG tablet Take 0.5-1 tablets (25-50 mg total) by mouth once daily. 30 tablet 1     No facility-administered medications prior to visit.     Medications Discontinued During This Encounter   Medication Reason    sertraline (ZOLOFT) 50 MG tablet Reorder    clonazePAM (KLONOPIN) 0.5 MG tablet Reorder     Medications Ordered This Encounter   Medications    clonazePAM (KLONOPIN) 0.5 MG tablet     Sig: Take 0.5-1 tablets (0.25-0.5 mg total) by mouth nightly as needed (for anxiety or insomnia).     Dispense:  30 tablet     Refill:  5    sertraline (ZOLOFT) 50 MG tablet     Sig: Take 1 tablet (50 mg total) by mouth once daily.     Dispense:  90 tablet     Refill:  3     Review of Systems   Constitutional:  Negative for activity change and unexpected weight change.   HENT:  Negative for hearing loss, rhinorrhea and trouble swallowing.    Eyes:  Negative for discharge and visual disturbance.   Respiratory:  Negative for chest tightness and wheezing.    Cardiovascular:  Negative for chest pain and palpitations.   Gastrointestinal:  Negative for blood in stool, constipation, diarrhea and vomiting.   Endocrine: Negative for polydipsia and polyuria.   Genitourinary:  Negative for difficulty urinating, dysuria, hematuria and menstrual problem.   Musculoskeletal:  Negative for arthralgias, joint swelling and neck pain.   Neurological:  Negative for weakness and headaches.   Psychiatric/Behavioral:  Positive for dysphoric mood. Negative for confusion.    PHYSICAL EXAM  CONSTITUTIONAL: No apparent distress. Appears comfortable. Does not appear acutely ill or septic. Appears adequately hydrated.  PULMONARY: Breathing unlabored. No audible wheezes. Easily speaks in full sentences.  PSYCHIATRIC: Alert and conversant and grossly oriented. Mood is grossly neutral. Affect appropriate. Judgment and insight grossly intact.    Orders Placed This Encounter    sertraline (ZOLOFT) 50 MG tablet     "clonazePAM (KLONOPIN) 0.5 MG tablet     TOTAL TIME evaluating and managing this patient for this encounter was greater than or equal to 20 minutes. This time was spent personally by me on the following activities: review of patient's past medical history, assessing age-appropriate health maintenance needs, review of any interval history, medication reconciliation, obtaining history from the patient, examination of the patient, review and interpretation of lab results, reviewing consulting specialist notes, evaluation of the patient's response to treatment, review of [place] [employer] Controlled Prescription Drug Monitoring database records for the patient, managing and/or ordering prescription medications, educating patient and answering their questions about treatment plan, goals of treatment, and follow-up, and final documentation of the visit. This time was exclusive of any separately billable procedures for this patient and exclusive of time spent treating any other patients.    Documentation entered by me for this encounter may have been done in part using speech-recognition technology. Although I have made an effort to ensure accuracy, "sound like" errors may exist and should be interpreted in context.    Each patient to whom medical services are provided by telemedicine is: (1) informed of the relationship between the physician and patient and the respective role of any other health care provider with respect to management of the patient; and (2) notified that he or she may decline to receive medical services by telemedicine and may withdraw from such care at any time.   "

## 2022-12-08 NOTE — ASSESSMENT & PLAN NOTE
Lab Results   Component Value Date    CHOL 217 (H) 03/03/2022    CHOL 236 (H) 06/24/2020    TRIG 121 03/03/2022    TRIG 140 06/24/2020    HDL 48 03/03/2022    HDL 54 06/24/2020    LDLCALC 144.8 03/03/2022    LDLCALC 154.0 06/24/2020    NONHDLCHOL 169 03/03/2022    NONHDLCHOL 182 06/24/2020    AST 22 03/03/2022    ALT 11 03/03/2022     The 10-year ASCVD risk score (Anjum QUEZADA, et al., 2019) is: 0.7%    Values used to calculate the score:      Age: 42 years      Sex: Female      Is Non- : No      Diabetic: No      Tobacco smoker: No      Systolic Blood Pressure: 104 mmHg      Is BP treated: No      HDL Cholesterol: 48 mg/dL      Total Cholesterol: 217 mg/dL

## 2022-12-08 NOTE — ASSESSMENT & PLAN NOTE
Paty reports she is doing well on her sertraline 50 mg QD, bupropion 300 mg QD, and clonazepam 0.25-0.5mg QHS PRN. Paty reports preceiving no adverse side-effects and wants to continue current treatment.

## 2023-01-05 ENCOUNTER — OFFICE VISIT (OUTPATIENT)
Dept: INTERNAL MEDICINE | Facility: CLINIC | Age: 43
End: 2023-01-05
Payer: OTHER GOVERNMENT

## 2023-01-05 VITALS
SYSTOLIC BLOOD PRESSURE: 110 MMHG | OXYGEN SATURATION: 96 % | HEART RATE: 74 BPM | BODY MASS INDEX: 27.54 KG/M2 | DIASTOLIC BLOOD PRESSURE: 68 MMHG | HEIGHT: 63 IN | TEMPERATURE: 97 F | WEIGHT: 155.44 LBS

## 2023-01-05 DIAGNOSIS — D32.9 MENINGIOMA: Chronic | ICD-10-CM

## 2023-01-05 DIAGNOSIS — R42 VERTIGO: Chronic | ICD-10-CM

## 2023-01-05 DIAGNOSIS — E06.3 HYPOTHYROIDISM DUE TO HASHIMOTO'S THYROIDITIS: Chronic | ICD-10-CM

## 2023-01-05 DIAGNOSIS — E87.6 HYPOKALEMIA: ICD-10-CM

## 2023-01-05 DIAGNOSIS — F41.1 GENERALIZED ANXIETY DISORDER: Chronic | ICD-10-CM

## 2023-01-05 DIAGNOSIS — E03.8 HYPOTHYROIDISM DUE TO HASHIMOTO'S THYROIDITIS: Chronic | ICD-10-CM

## 2023-01-05 DIAGNOSIS — F33.41 MAJOR DEPRESSIVE DISORDER, RECURRENT, IN PARTIAL REMISSION: Chronic | ICD-10-CM

## 2023-01-05 PROCEDURE — 99999 PR PBB SHADOW E&M-EST. PATIENT-LVL IV: ICD-10-PCS | Mod: PBBFAC,,, | Performed by: FAMILY MEDICINE

## 2023-01-05 PROCEDURE — 99214 OFFICE O/P EST MOD 30 MIN: CPT | Mod: S$GLB,,, | Performed by: FAMILY MEDICINE

## 2023-01-05 PROCEDURE — 99999 PR PBB SHADOW E&M-EST. PATIENT-LVL IV: CPT | Mod: PBBFAC,,, | Performed by: FAMILY MEDICINE

## 2023-01-05 PROCEDURE — 99214 PR OFFICE/OUTPT VISIT, EST, LEVL IV, 30-39 MIN: ICD-10-PCS | Mod: S$GLB,,, | Performed by: FAMILY MEDICINE

## 2023-01-05 RX ORDER — BUPROPION HYDROCHLORIDE 300 MG/1
300 TABLET ORAL DAILY
Qty: 90 TABLET | Refills: 3 | Status: SHIPPED | OUTPATIENT
Start: 2023-01-05 | End: 2024-02-13 | Stop reason: SDUPTHER

## 2023-01-05 RX ORDER — SERTRALINE HYDROCHLORIDE 50 MG/1
50 TABLET, FILM COATED ORAL DAILY
Qty: 90 TABLET | Refills: 3 | Status: SHIPPED | OUTPATIENT
Start: 2023-01-05 | End: 2024-01-05

## 2023-01-05 RX ORDER — LEVOTHYROXINE SODIUM 50 UG/1
50 TABLET ORAL
Qty: 90 TABLET | Refills: 3 | Status: SHIPPED | OUTPATIENT
Start: 2023-01-05 | End: 2023-11-16

## 2023-01-05 RX ORDER — MECLIZINE HYDROCHLORIDE 25 MG/1
25 TABLET ORAL 3 TIMES DAILY PRN
Qty: 90 TABLET | Refills: 3 | Status: SHIPPED | OUTPATIENT
Start: 2023-01-05

## 2023-01-05 RX ORDER — CLONAZEPAM 0.5 MG/1
.25-.5 TABLET ORAL NIGHTLY PRN
Qty: 30 TABLET | Refills: 5 | Status: SHIPPED | OUTPATIENT
Start: 2023-01-05 | End: 2024-01-30 | Stop reason: SDUPTHER

## 2023-01-05 RX ORDER — POTASSIUM CHLORIDE 20 MEQ/1
20 TABLET, EXTENDED RELEASE ORAL DAILY
Qty: 90 TABLET | Refills: 3 | Status: SHIPPED | OUTPATIENT
Start: 2023-01-05 | End: 2024-01-05

## 2023-01-05 NOTE — PROGRESS NOTES
OFFICE VISIT 1/5/23  4:30 PM CST    CHIEF COMPLAINT: Adenopathy    She incidentally noted a few non-tender pea-sized cervical lymph nodes bilaterally. No related symptoms. Exam is benign. PLAN: Watchful waiting.    Paty reports that her chronic conditions are stable, and she reports no other new complaints or concerns. She says she is doing well on her current medicines, she reports preceiving no adverse side-effects and wants to continue current treatment, asking for new prescriptions to be send to [facility].     ASSESSMENT & PLAN  1. Hypothyroidism due to Hashimoto's thyroiditis  -     levothyroxine (SYNTHROID) 50 MCG tablet; Take 1 tablet (50 mcg total) by mouth before breakfast.  Dispense: 90 tablet; Refill: 3    2. Generalized anxiety disorder  -     buPROPion (WELLBUTRIN XL) 300 MG 24 hr tablet; Take 1 tablet (300 mg total) by mouth once daily.  Dispense: 90 tablet; Refill: 3  -     clonazePAM (KLONOPIN) 0.5 MG tablet; Take 0.5-1 tablets (0.25-0.5 mg total) by mouth nightly as needed (for anxiety or insomnia).  Dispense: 30 tablet; Refill: 5  -     sertraline (ZOLOFT) 50 MG tablet; Take 1 tablet (50 mg total) by mouth once daily.  Dispense: 90 tablet; Refill: 3    3. Vertigo with ataxia  Overview:  NEUROLOGIST: Dr. Satya Carias    Orders:  -     meclizine (ANTIVERT) 25 mg tablet; Take 1 tablet (25 mg total) by mouth 3 (three) times daily as needed for Dizziness.  Dispense: 90 tablet; Refill: 3    4. Hypokalemia  -     potassium chloride SA (K-DUR,KLOR-CON) 20 MEQ tablet; Take 1 tablet (20 mEq total) by mouth once daily.  Dispense: 90 tablet; Refill: 3    5. Major depressive disorder, recurrent, in partial remission  -     buPROPion (WELLBUTRIN XL) 300 MG 24 hr tablet; Take 1 tablet (300 mg total) by mouth once daily.  Dispense: 90 tablet; Refill: 3  -     sertraline (ZOLOFT) 50 MG tablet; Take 1 tablet (50 mg total) by mouth once daily.  Dispense: 90 tablet; Refill: 3    6. Meningioma, left temporal  region  Overview:  NEUROSURGEON: Dr. Zhu  NEUROLOGIST: Dr. Satya Carias    Assessment & Plan:  Last MRI was Sept, 2022. Reported as stable.        Unless noted herein, any chronic conditions are represented as and appear compensated/controlled and stable, and no other significant complaints or concerns were reported.    Follow up in about 6 weeks (around 2/17/2023) for review test results, discuss treatment plan, re-evaluate problem(s) discussed today.   Future Appointments   Date Time Provider Department [place]   2/10/2023  7:45 AM LABORATORY, [facility] LAB [facility]   2/17/2023  9:00 AM HA Hunter MD [facility] IM [facility]   4/19/2023 10:00 AM LABORATORY, [facility] LAB [facility]   4/19/2023 11:00 AM [facility] MRI [facility]   4/20/2023  3:40 PM Elana Delarosa MD [facility]   8/25/2023  9:30 AM Elana Delarosa MD [facility]       PRESCRIPTION DRUG MANAGEMENT  Outpatient Medications Prior to Visit   Medication Sig Dispense Refill    topiramate (TOPAMAX) 50 MG tablet Take 50 mg by mouth once daily.      buPROPion (WELLBUTRIN XL) 300 MG 24 hr tablet Take 1 tablet (300 mg total) by mouth once daily. 90 tablet 3    clonazePAM (KLONOPIN) 0.5 MG tablet Take 0.5-1 tablets (0.25-0.5 mg total) by mouth nightly as needed (for anxiety or insomnia). 30 tablet 5    levothyroxine (SYNTHROID) 50 MCG tablet Take 1 tablet (50 mcg total) by mouth before breakfast. 90 tablet 3    meclizine (ANTIVERT) 25 mg tablet Take 1 tablet (25 mg total) by mouth 3 (three) times daily as needed for Dizziness. 90 tablet 3    potassium chloride SA (K-DUR,KLOR-CON) 20 MEQ tablet TAKE 1 TABLET(20 MEQ) BY MOUTH TWICE DAILY 180 tablet 0    sertraline (ZOLOFT) 50 MG tablet Take 1 tablet (50 mg total) by mouth once daily. 90 tablet 3    norgestrel-ethinyl estradioL (LO/OVRAL) 0.3-30 mg-mcg per tablet Take 1 tablet by mouth once daily.       No facility-administered medications prior to visit.     Medications  Discontinued During This Encounter   Medication Reason    sertraline (ZOLOFT) 50 MG tablet Reorder    potassium chloride SA (K-DUR,KLOR-CON) 20 MEQ tablet Reorder    meclizine (ANTIVERT) 25 mg tablet Reorder    levothyroxine (SYNTHROID) 50 MCG tablet Reorder    clonazePAM (KLONOPIN) 0.5 MG tablet Reorder    buPROPion (WELLBUTRIN XL) 300 MG 24 hr tablet Reorder     Medications Ordered This Encounter   Medications    buPROPion (WELLBUTRIN XL) 300 MG 24 hr tablet     Sig: Take 1 tablet (300 mg total) by mouth once daily.     Dispense:  90 tablet     Refill:  3    clonazePAM (KLONOPIN) 0.5 MG tablet     Sig: Take 0.5-1 tablets (0.25-0.5 mg total) by mouth nightly as needed (for anxiety or insomnia).     Dispense:  30 tablet     Refill:  5    levothyroxine (SYNTHROID) 50 MCG tablet     Sig: Take 1 tablet (50 mcg total) by mouth before breakfast.     Dispense:  90 tablet     Refill:  3    meclizine (ANTIVERT) 25 mg tablet     Sig: Take 1 tablet (25 mg total) by mouth 3 (three) times daily as needed for Dizziness.     Dispense:  90 tablet     Refill:  3    potassium chloride SA (K-DUR,KLOR-CON) 20 MEQ tablet     Sig: Take 1 tablet (20 mEq total) by mouth once daily.     Dispense:  90 tablet     Refill:  3    sertraline (ZOLOFT) 50 MG tablet     Sig: Take 1 tablet (50 mg total) by mouth once daily.     Dispense:  90 tablet     Refill:  3   Review of Systems   Constitutional:  Negative for activity change, chills, diaphoresis, fever and unexpected weight change.   HENT:  Negative for ear pain, hearing loss, rhinorrhea, sinus pressure/congestion, sore throat and trouble swallowing.    Eyes:  Negative for discharge and visual disturbance.   Respiratory:  Negative for chest tightness and wheezing.    Cardiovascular:  Negative for chest pain and palpitations.   Gastrointestinal:  Negative for blood in stool, constipation, diarrhea and vomiting.   Endocrine: Negative for polydipsia and polyuria.   Genitourinary:  Negative for  "difficulty urinating, dysuria, hematuria and menstrual problem.   Musculoskeletal:  Negative for arthralgias, joint swelling and neck pain.   Neurological:  Negative for weakness and headaches.   Psychiatric/Behavioral:  Positive for dysphoric mood. Negative for confusion.     PHYSICAL EXAM  Vitals:    01/05/23 1621   BP: 110/68   BP Location: Right arm   Patient Position: Sitting   BP Method: Medium (Manual)   Pulse: 74   Temp: 96.5 °F (35.8 °C)   TempSrc: Tympanic   SpO2: 96%   Weight: 70.5 kg (155 lb 6.8 oz)   Height: 5' 3" (1.6 m)   Physical Exam  Vitals reviewed.   Constitutional:       General: She is not in acute distress.     Appearance: Normal appearance. She is not ill-appearing, toxic-appearing or diaphoretic.   HENT:      Right Ear: Tympanic membrane, ear canal and external ear normal. There is no impacted cerumen.      Left Ear: Tympanic membrane, ear canal and external ear normal. There is no impacted cerumen.      Mouth/Throat:      Mouth: Mucous membranes are moist.      Pharynx: Oropharynx is clear. No oropharyngeal exudate or posterior oropharyngeal erythema.   Eyes:      General:         Right eye: No discharge.         Left eye: No discharge.   Neck:      Vascular: No carotid bruit.   Cardiovascular:      Rate and Rhythm: Normal rate and regular rhythm.      Heart sounds: Normal heart sounds.   Pulmonary:      Effort: Pulmonary effort is normal.      Breath sounds: Normal breath sounds.   Lymphadenopathy:      Cervical: No cervical adenopathy (few benign pea-sized nodes as described in HPI).   Skin:     General: Skin is warm and dry.   Neurological:      Mental Status: She is alert and oriented to person, place, and time. Mental status is at baseline.   Psychiatric:         Mood and Affect: Mood normal.         Behavior: Behavior normal.         Judgment: Judgment normal.        Documentation entered by me for this encounter may have been done in part using speech-recognition technology. Although " "I have made an effort to ensure accuracy, "sound like" errors may exist and should be interpreted in context.   "

## 2023-02-10 ENCOUNTER — LAB VISIT (OUTPATIENT)
Dept: LAB | Facility: HOSPITAL | Age: 43
End: 2023-02-10
Attending: FAMILY MEDICINE
Payer: OTHER GOVERNMENT

## 2023-02-10 DIAGNOSIS — D75.839 THROMBOCYTOSIS: Chronic | ICD-10-CM

## 2023-02-10 DIAGNOSIS — E06.3 HYPOTHYROIDISM DUE TO HASHIMOTO'S THYROIDITIS: Chronic | ICD-10-CM

## 2023-02-10 DIAGNOSIS — E78.2 MODERATE MIXED HYPERLIPIDEMIA NOT REQUIRING STATIN THERAPY: Chronic | ICD-10-CM

## 2023-02-10 DIAGNOSIS — E03.8 HYPOTHYROIDISM DUE TO HASHIMOTO'S THYROIDITIS: Chronic | ICD-10-CM

## 2023-02-10 DIAGNOSIS — E87.6 HYPOKALEMIA: ICD-10-CM

## 2023-02-10 LAB
ALBUMIN SERPL BCP-MCNC: 3.6 G/DL (ref 3.5–5.2)
ALP SERPL-CCNC: 68 U/L (ref 55–135)
ALT SERPL W/O P-5'-P-CCNC: 27 U/L (ref 10–44)
ANION GAP SERPL CALC-SCNC: 11 MMOL/L (ref 8–16)
AST SERPL-CCNC: 29 U/L (ref 10–40)
BILIRUB SERPL-MCNC: 0.3 MG/DL (ref 0.1–1)
BUN SERPL-MCNC: 14 MG/DL (ref 6–20)
CALCIUM SERPL-MCNC: 9.3 MG/DL (ref 8.7–10.5)
CHLORIDE SERPL-SCNC: 108 MMOL/L (ref 95–110)
CHOLEST SERPL-MCNC: 215 MG/DL (ref 120–199)
CHOLEST/HDLC SERPL: 3.4 {RATIO} (ref 2–5)
CO2 SERPL-SCNC: 22 MMOL/L (ref 23–29)
CREAT SERPL-MCNC: 0.8 MG/DL (ref 0.5–1.4)
ERYTHROCYTE [DISTWIDTH] IN BLOOD BY AUTOMATED COUNT: 13.5 % (ref 11.5–14.5)
EST. GFR  (NO RACE VARIABLE): >60 ML/MIN/1.73 M^2
GLUCOSE SERPL-MCNC: 74 MG/DL (ref 70–110)
HCT VFR BLD AUTO: 48.8 % (ref 37–48.5)
HDLC SERPL-MCNC: 63 MG/DL (ref 40–75)
HDLC SERPL: 29.3 % (ref 20–50)
HGB BLD-MCNC: 15.5 G/DL (ref 12–16)
LDLC SERPL CALC-MCNC: 134.8 MG/DL (ref 63–159)
MCH RBC QN AUTO: 30.7 PG (ref 27–31)
MCHC RBC AUTO-ENTMCNC: 31.8 G/DL (ref 32–36)
MCV RBC AUTO: 97 FL (ref 82–98)
NONHDLC SERPL-MCNC: 152 MG/DL
PLATELET # BLD AUTO: 406 K/UL (ref 150–450)
PMV BLD AUTO: 9.7 FL (ref 9.2–12.9)
POTASSIUM SERPL-SCNC: 4.1 MMOL/L (ref 3.5–5.1)
PROT SERPL-MCNC: 7.2 G/DL (ref 6–8.4)
RBC # BLD AUTO: 5.05 M/UL (ref 4–5.4)
SODIUM SERPL-SCNC: 141 MMOL/L (ref 136–145)
T4 FREE SERPL-MCNC: 0.97 NG/DL (ref 0.71–1.51)
TRIGL SERPL-MCNC: 86 MG/DL (ref 30–150)
TSH SERPL DL<=0.005 MIU/L-ACNC: 1.38 UIU/ML (ref 0.4–4)
WBC # BLD AUTO: 8.28 K/UL (ref 3.9–12.7)

## 2023-02-10 PROCEDURE — 85027 COMPLETE CBC AUTOMATED: CPT | Performed by: FAMILY MEDICINE

## 2023-02-10 PROCEDURE — 84443 ASSAY THYROID STIM HORMONE: CPT | Performed by: FAMILY MEDICINE

## 2023-02-10 PROCEDURE — 80061 LIPID PANEL: CPT | Performed by: FAMILY MEDICINE

## 2023-02-10 PROCEDURE — 84439 ASSAY OF FREE THYROXINE: CPT | Performed by: FAMILY MEDICINE

## 2023-02-10 PROCEDURE — 36415 COLL VENOUS BLD VENIPUNCTURE: CPT | Performed by: FAMILY MEDICINE

## 2023-02-10 PROCEDURE — 80053 COMPREHEN METABOLIC PANEL: CPT | Performed by: FAMILY MEDICINE

## 2023-02-17 ENCOUNTER — OFFICE VISIT (OUTPATIENT)
Dept: INTERNAL MEDICINE | Facility: CLINIC | Age: 43
End: 2023-02-17
Payer: OTHER GOVERNMENT

## 2023-02-17 VITALS
RESPIRATION RATE: 18 BRPM | DIASTOLIC BLOOD PRESSURE: 66 MMHG | WEIGHT: 158.31 LBS | HEART RATE: 81 BPM | OXYGEN SATURATION: 99 % | HEIGHT: 63 IN | BODY MASS INDEX: 28.05 KG/M2 | SYSTOLIC BLOOD PRESSURE: 112 MMHG | TEMPERATURE: 98 F

## 2023-02-17 DIAGNOSIS — M54.16 LUMBAR RADICULOPATHY: ICD-10-CM

## 2023-02-17 DIAGNOSIS — L30.9 DERMATITIS: Primary | ICD-10-CM

## 2023-02-17 DIAGNOSIS — M62.838 MUSCLE SPASM: ICD-10-CM

## 2023-02-17 PROCEDURE — 99999 PR PBB SHADOW E&M-EST. PATIENT-LVL V: CPT | Mod: PBBFAC,,, | Performed by: NURSE PRACTITIONER

## 2023-02-17 PROCEDURE — 99999 PR PBB SHADOW E&M-EST. PATIENT-LVL V: ICD-10-PCS | Mod: PBBFAC,,, | Performed by: NURSE PRACTITIONER

## 2023-02-17 PROCEDURE — 99214 OFFICE O/P EST MOD 30 MIN: CPT | Mod: S$GLB,,, | Performed by: NURSE PRACTITIONER

## 2023-02-17 PROCEDURE — 99214 PR OFFICE/OUTPT VISIT, EST, LEVL IV, 30-39 MIN: ICD-10-PCS | Mod: S$GLB,,, | Performed by: NURSE PRACTITIONER

## 2023-02-17 RX ORDER — MUPIROCIN 20 MG/G
OINTMENT TOPICAL 2 TIMES DAILY PRN
Qty: 30 G | Refills: 0 | Status: SHIPPED | OUTPATIENT
Start: 2023-02-17

## 2023-02-17 RX ORDER — TIZANIDINE 2 MG/1
4 TABLET ORAL
Qty: 60 TABLET | Refills: 0 | Status: SHIPPED | OUTPATIENT
Start: 2023-02-17 | End: 2023-02-27

## 2023-02-17 NOTE — PROGRESS NOTES
Chief Complaint  Chief Complaint   Patient presents with    Follow-up         HPI     HPI  Paty Parham is a 42 y.o. female with medical diagnoses as listed in the medical history and problem list that presents for Dermatitis. This patient is new to me.     Dermatitis: Circular flat, erythematous lesion located at right forearm has been present for approx 4 months. Does not recall an insect bite or injury. Not painful or pruritic. Has been using OTC topical steroid without relief    2. Lumbar Spine Radiculopathy: Visited with external sports medicine provider in 2017 for radiculopathy. Treated with lumbar spine injections that she says worked tremendously. Today she request a referral to revisit ta provider. She recently experienced an overuse injury that is not becoming better with rest and decrease in high impact exercises. Full ROM in clinic today.       History     PAST MEDICAL HISTORY:  Past Medical History:   Diagnosis Date    Depression     Digestive disorder     Hypertension     Hypothyroidism     Major depressive disorder, recurrent, mild-moderate, with anxiety 8/31/2017    Meniere's disease, left ear     Meningioma, left temporal region 6/24/2020    NEUROSURGEON: Dr. Zhu NEUROLOGIST: Dr. Satya Carias    Moderate mixed hyperlipidemia not requiring statin therapy 6/24/2020    Plantar fascial fibromatosis     PONV (postoperative nausea and vomiting)     Recurrent suppurative otitis media of right ear with spontaneous tympanic membrane rupture     Vertigo        PAST SURGICAL HISTORY:  Past Surgical History:   Procedure Laterality Date    BREAST BIOPSY Right 04/2022    CHOLECYSTECTOMY      epidural steroid injection      ESOPHAGOGASTRODUODENOSCOPY N/A 05/03/2021    Procedure: ESOPHAGOGASTRODUODENOSCOPY (EGD);  Surgeon: Mirian Hernandez MD;  Location: [facility];  Service: Endoscopy;  Laterality: N/A;    WISDOM TOOTH EXTRACTION      X 1       SOCIAL HISTORY:  Social History     Socioeconomic  History    Marital status: Single    Number of children: 0    Years of education: Doctoral Degree   Tobacco Use    Smoking status: Never    Smokeless tobacco: Never   Substance and Sexual Activity    Alcohol use: Yes     Comment: <3 drinks per week.      Drug use: No    Sexual activity: Yes     Partners: Male     Birth control/protection: OCP   Social History Narrative    Full time employed, PhD Law Degree; No smokers in household, 1 dog.       FAMILY HISTORY:  Family History   Problem Relation Age of Onset    Hypothyroidism Mother     Hyperparathyroidism Mother     Sjogren's syndrome Mother     Ankylosing spondylitis Mother     Sarcoidosis Mother     Breast cancer Paternal Grandmother     Ovarian cancer Paternal Grandmother     ADD / ADHD Brother     Hypertension Brother     Hyperlipidemia Brother        ALLERGIES AND MEDICATIONS: updated and reviewed.  Review of patient's allergies indicates:   Allergen Reactions    Wasp venom Rash     Mental confusion.     Current Outpatient Medications   Medication Sig Dispense Refill    buPROPion (WELLBUTRIN XL) 300 MG 24 hr tablet Take 1 tablet (300 mg total) by mouth once daily. 90 tablet 3    clonazePAM (KLONOPIN) 0.5 MG tablet Take 0.5-1 tablets (0.25-0.5 mg total) by mouth nightly as needed (for anxiety or insomnia). 30 tablet 5    levothyroxine (SYNTHROID) 50 MCG tablet Take 1 tablet (50 mcg total) by mouth before breakfast. 90 tablet 3    meclizine (ANTIVERT) 25 mg tablet Take 1 tablet (25 mg total) by mouth 3 (three) times daily as needed for Dizziness. 90 tablet 3    norgestrel-ethinyl estradioL (LO/OVRAL) 0.3-30 mg-mcg per tablet Take 1 tablet by mouth once daily.      potassium chloride SA (K-DUR,KLOR-CON) 20 MEQ tablet Take 1 tablet (20 mEq total) by mouth once daily. 90 tablet 3    sertraline (ZOLOFT) 50 MG tablet Take 1 tablet (50 mg total) by mouth once daily. 90 tablet 3    topiramate (TOPAMAX) 50 MG tablet Take 50 mg by mouth once daily.      mupirocin  "(BACTROBAN) 2 % ointment Apply topically 2 (two) times daily as needed. 30 g 0    tiZANidine (ZANAFLEX) 2 MG tablet Take 2 tablets (4 mg total) by mouth after meals as needed. 60 tablet 0     No current facility-administered medications for this visit.           Exam     ROS  Review of Systems   Constitutional:  Negative for appetite change, chills, fatigue and fever.   HENT:  Negative for congestion, ear pain, postnasal drip, rhinorrhea, sinus pressure, sneezing and sore throat.    Respiratory:  Negative for shortness of breath.    Cardiovascular:  Negative for chest pain and palpitations.   Gastrointestinal:  Negative for abdominal pain, constipation, diarrhea, nausea and vomiting.   Genitourinary:  Negative for dysuria.   Musculoskeletal:  Positive for back pain and myalgias. Negative for arthralgias.   Skin:  Positive for color change and rash.   Neurological:  Negative for headaches.   Psychiatric/Behavioral:  Negative for sleep disturbance.          Physical Exam  Vitals:    02/17/23 0739   BP: 112/66   BP Location: Left arm   Patient Position: Sitting   BP Method: Medium (Manual)   Pulse: 81   Resp: 18   Temp: 98.1 °F (36.7 °C)   TempSrc: Tympanic   SpO2: 99%   Weight: 71.8 kg (158 lb 4.6 oz)   Height: 5' 3" (1.6 m)    Body mass index is 28.04 kg/m².  Weight: 71.8 kg (158 lb 4.6 oz)   Height: 5' 3" (160 cm)   Physical Exam  Constitutional:       General: She is not in acute distress.     Appearance: Normal appearance.   HENT:      Head: Normocephalic and atraumatic.      Right Ear: External ear normal.      Left Ear: External ear normal.      Nose: Nose normal.   Eyes:      Pupils: Pupils are equal, round, and reactive to light.   Cardiovascular:      Rate and Rhythm: Normal rate and regular rhythm.      Pulses: Normal pulses.   Pulmonary:      Effort: Pulmonary effort is normal. No respiratory distress.      Breath sounds: Normal breath sounds. No wheezing.   Abdominal:      General: Bowel sounds are normal. "      Palpations: Abdomen is soft.   Musculoskeletal:      Cervical back: Neck supple.   Lymphadenopathy:      Cervical: No cervical adenopathy.   Skin:     General: Skin is warm and dry.      Capillary Refill: Capillary refill takes less than 2 seconds.      Findings: Erythema, lesion and rash present. Rash is macular.   Neurological:      Mental Status: She is alert and oriented to person, place, and time.   Psychiatric:         Mood and Affect: Mood normal.             Health Maintenance         Date Due Completion Date    Hemoglobin A1c (Diabetic Prevention Screening) Never done ---    Influenza Vaccine (1) 09/01/2022 10/19/2021    Mammogram 09/15/2023 9/15/2022    Cervical Cancer Screening 09/23/2025 9/23/2022    TETANUS VACCINE 10/12/2025 10/12/2015              Assessment & Plan     Assessment & Plan  Problem List Items Addressed This Visit    None  Visit Diagnoses       Dermatitis    -  Primary  - Advised use OTC Hibiclens prior to applying Bactroban  - Derm referral per patient's request    Relevant Medications    mupirocin (BACTROBAN) 2 % ointment, BID PRN    Other Relevant Orders    Ambulatory referral/consult to Dermatology    Lumbar radiculopathy      - I advised her to use OTC 4% Lidocaine patches PRN  - Patient deferred referral for PT today     Relevant Medications    tiZANidine (ZANAFLEX) 2 MG tablet, BID PRN    Other Relevant Orders    Ambulatory referral/consult to Sports Medicine    Muscle spasm    - As above      Relevant Medications    tiZANidine (ZANAFLEX) 2 MG tablet              Health Maintenance reviewed: Deferred per patient     Follow-up: RTC as needed     30+ minutes of total time spent on the encounter, which includes face to face time and non-face to face time preparing to see the patient (eg, review of tests), Obtaining and/or reviewing separately obtained history, documenting clinical information in the electronic or other health record, independently interpreting results (not  separately reported) and communicating results to the patient/family/caregiver, or Care coordination (not separately reported).

## 2023-04-19 ENCOUNTER — HOSPITAL ENCOUNTER (OUTPATIENT)
Dept: RADIOLOGY | Facility: HOSPITAL | Age: 43
Discharge: HOME | End: 2023-04-19
Attending: SURGERY
Payer: OTHER GOVERNMENT

## 2023-04-19 DIAGNOSIS — Z91.89 AT HIGH RISK FOR BREAST CANCER: ICD-10-CM

## 2023-04-19 PROCEDURE — 77049 MRI BREAST C-+ W/CAD BI: CPT | Mod: 26,,, | Performed by: RADIOLOGY

## 2023-04-19 PROCEDURE — 25500020 PHARM REV CODE 255: Performed by: SURGERY

## 2023-04-19 PROCEDURE — A9577 INJ MULTIHANCE: HCPCS | Performed by: SURGERY

## 2023-04-19 PROCEDURE — 77049 MRI BREAST W/WO CONTRAST, W/CAD, BILATERAL: ICD-10-PCS | Mod: 26,,, | Performed by: RADIOLOGY

## 2023-04-19 PROCEDURE — 77049 MRI BREAST C-+ W/CAD BI: CPT | Mod: TC

## 2023-04-19 RX ADMIN — GADOBENATE DIMEGLUMINE 15 ML: 529 INJECTION, SOLUTION INTRAVENOUS at 11:04

## 2023-04-20 ENCOUNTER — OFFICE VISIT (OUTPATIENT)
Dept: SURGERY | Facility: CLINIC | Age: 43
End: 2023-04-20
Payer: OTHER GOVERNMENT

## 2023-04-20 DIAGNOSIS — Z12.31 ENCOUNTER FOR SCREENING MAMMOGRAM FOR BREAST CANCER: Primary | ICD-10-CM

## 2023-04-20 DIAGNOSIS — Z91.89 AT HIGH RISK FOR BREAST CANCER: Chronic | ICD-10-CM

## 2023-04-20 DIAGNOSIS — R92.30 DENSE BREAST TISSUE: ICD-10-CM

## 2023-04-20 PROCEDURE — 99213 PR OFFICE/OUTPT VISIT, EST, LEVL III, 20-29 MIN: ICD-10-PCS | Mod: S$GLB,,, | Performed by: SURGERY

## 2023-04-20 PROCEDURE — 99999 PR PBB SHADOW E&M-EST. PATIENT-LVL I: ICD-10-PCS | Mod: PBBFAC,,, | Performed by: SURGERY

## 2023-04-20 PROCEDURE — 99999 PR PBB SHADOW E&M-EST. PATIENT-LVL I: CPT | Mod: PBBFAC,,, | Performed by: SURGERY

## 2023-04-20 PROCEDURE — 99213 OFFICE O/P EST LOW 20 MIN: CPT | Mod: S$GLB,,, | Performed by: SURGERY

## 2023-04-20 NOTE — PROGRESS NOTES
Breast Surgery  Department of General Surgery   [facility]    Date of Service: 04/20/2023    DIAGNOSIS:   This is a 42 y.o. female with lifetime risk of breast cancer >20%.   Genetic testing: none    HISTORY OF PRESENT ILLNESS:   Paty Parham is a 42 y.o. female comes in for high-risk follow up.  She denies change in her breast self-exam specifically denying new masses, skin or nipple changes, or nipple discharge. Past medical and surgical history is updated with no new changes. There have been no changes to family history. The patient denies constitutional symptoms of night sweats, weight loss, new headaches, visual changes, new back or bony pain, chest pain, or shortness of breath.      IMAGING:   MRI:   Results for orders placed during the hospital encounter of 04/19/23    MRI Breast w/wo Contrast, w/CAD, Bilateral    Narrative  Result:  MRI Breast w/wo Contrast, w/CAD, Bilateral    History:  Patient is 42 y.o. and is seen for at high risk for breast cancer.      Films Compared:  Compared to: 09/15/2022 Mammo Digital Diagnostic Bilat with Arnav, 09/15/2022 US Breast Right Limited, 04/04/2022 Mammo Previous, 08/11/2021 Mammo Previous, and outside MRI Breast from 3/22/22    Technique:  A routine breast MRI was performed with a dedicated breast coil. Pre-contrast STIR were acquired. Then, pre and post contrast T1 weighted fat saturated images were acquired and subtracted with MIP reconstruction. 15 ml of intravenous gadolinium contrast was administered. The study was reviewed with [employer] software.    Findings:  The breasts have heterogeneous fibroglandular tissue. The background parenchymal enhancement is mild and symmetric.    No significant masses, enhancements, or other abnormal findings are seen.    Impression  No significant masses, enhancements, or other abnormal findings are seen.    BI-RADS Category:  Overall: 1 - Negative    Recommendation:  Screening Breast MRI in 1 year is recommended for both  breasts.    Your estimated lifetime risk of breast cancer (to age 85) based on Tyrer-Cuzick risk assessment model is Tyrer-Cuzick: 25.18 %. According to the American Cancer Society, patients with a lifetime breast cancer risk of 20% or higher might benefit from supplemental screening tests.    MMG:   Results for orders placed during the hospital encounter of 09/15/22    Mammo Digital Diagnostic Bilat with Arnav    Narrative  Result:  Mammo Digital Diagnostic Bilat with Arnav  US Breast Right Limited    History:  Patient is 42 y.o. and is seen for diagnostic imaging.    Films Compared:  Prior images (if available) were compared.    Findings:  This procedure was performed using tomosynthesis. Computer-aided detection was utilized in the interpretation of this examination.  The breasts are heterogeneously dense, which may obscure small masses. There is no evidence of suspicious masses, calcifications, or other abnormal findings.      Ultrasound was performed of the right breast, 03:00 o'clock 4 cm from the nipple and demonstrates stable 5 mm benign mass, likely fibroadenoma.    Impression  Bilateral  There is no mammographic or sonographic evidence of malignancy.    BI-RADS Category:  Overall: 2 - Benign    Recommendation:  Routine screening mammogram in 1 year is recommended.      Your estimated lifetime risk of breast cancer (to age 85) based on Tyrer-Cuzick risk assessment model is Tyrer-Cuzick: 25.18 %. According to the American Cancer Society, patients with a lifetime breast cancer risk of 20% or higher might benefit from supplemental screening tests.        PHYSICAL EXAM:   General: The patient appears well and is in no acute distress.   BREAST EXAM  No Asymmetry  Right:  - Mass: No  - Skin change: No  - Nipple Discharge: No  - Nipple retraction: No  - Axillary LAD: No  Left:   - Mass: No  - Skin change: No  - Nipple Discharge: No  - Nipple retraction: No  - Axillary LAD: No    ASSESSMENT:   This is a 42 y.o.  female at high risk of breast cancer     PLAN:   Paty Parham has a normal breast exam today.   Continue self breast awareness. We discussed the possible signs and symptoms of breast cancer as lump, masses, new asymmetries, skin changes and nipple changes. She is encouraged to contact me if any new breast concerns arise.  Her supplemental imaging plan is April 2024  Mammogram in September.  Return to clinic in September. The patient is in agreement with the plan. Questions were encouraged and answered to patient's satisfaction. Paty will call our office with any questions or concerns.     I spent a total of 25 minutes on this visit. This includes face to face time and non-face to face time preparing to see the patient (eg, review of tests), obtaining and/or reviewing separately obtained history, documenting clinical information in the electronic or other health record, independently interpreting results and communicating results to the patient/family/caregiver, or care coordinator.      Elana Delarosa

## 2023-06-02 ENCOUNTER — OFFICE VISIT (OUTPATIENT)
Dept: DERMATOLOGY | Facility: CLINIC | Age: 43
End: 2023-06-02
Payer: OTHER GOVERNMENT

## 2023-06-02 DIAGNOSIS — D48.5 NEOPLASM OF UNCERTAIN BEHAVIOR OF SKIN: Primary | ICD-10-CM

## 2023-06-02 PROCEDURE — 88305 TISSUE EXAM BY PATHOLOGIST: CPT | Mod: 26,,, | Performed by: PATHOLOGY

## 2023-06-02 PROCEDURE — 99999 PR PBB SHADOW E&M-EST. PATIENT-LVL III: ICD-10-PCS | Mod: PBBFAC,,, | Performed by: STUDENT IN AN ORGANIZED HEALTH CARE EDUCATION/TRAINING PROGRAM

## 2023-06-02 PROCEDURE — 88312 SPECIAL STAINS GROUP 1: CPT | Mod: 26,,, | Performed by: PATHOLOGY

## 2023-06-02 PROCEDURE — 88305 TISSUE EXAM BY PATHOLOGIST: ICD-10-PCS | Mod: 26,,, | Performed by: PATHOLOGY

## 2023-06-02 PROCEDURE — 99999 PR PBB SHADOW E&M-EST. PATIENT-LVL III: CPT | Mod: PBBFAC,,, | Performed by: STUDENT IN AN ORGANIZED HEALTH CARE EDUCATION/TRAINING PROGRAM

## 2023-06-02 PROCEDURE — 88312 SPECIAL STAINS GROUP 1: CPT | Performed by: PATHOLOGY

## 2023-06-02 PROCEDURE — 11104 PUNCH BX SKIN SINGLE LESION: CPT | Mod: S$GLB,,, | Performed by: STUDENT IN AN ORGANIZED HEALTH CARE EDUCATION/TRAINING PROGRAM

## 2023-06-02 PROCEDURE — 11104 PR PUNCH BIOPSY, SKIN, SINGLE LESION: ICD-10-PCS | Mod: S$GLB,,, | Performed by: STUDENT IN AN ORGANIZED HEALTH CARE EDUCATION/TRAINING PROGRAM

## 2023-06-02 PROCEDURE — 88312 PR  SPECIAL STAINS,GROUP I: ICD-10-PCS | Mod: 26,,, | Performed by: PATHOLOGY

## 2023-06-02 PROCEDURE — 99499 UNLISTED E&M SERVICE: CPT | Mod: S$GLB,,, | Performed by: STUDENT IN AN ORGANIZED HEALTH CARE EDUCATION/TRAINING PROGRAM

## 2023-06-02 PROCEDURE — 99499 NO LOS: ICD-10-PCS | Mod: S$GLB,,, | Performed by: STUDENT IN AN ORGANIZED HEALTH CARE EDUCATION/TRAINING PROGRAM

## 2023-06-02 PROCEDURE — 88305 TISSUE EXAM BY PATHOLOGIST: CPT | Performed by: PATHOLOGY

## 2023-06-02 NOTE — PROGRESS NOTES
Patient Information  Name: Paty Parham  : 1980  MRN: [id number]     Referring Physician:  Dr. Tierney   Primary Care Physician:  Dr. HA Hunter MD   Date of Visit: 2023      Subjective:       Paty Parham is a 42 y.o. female who presents for   Chief Complaint   Patient presents with    Rash     C/o rash on right forearm, itching      Rash    Patient with new complaint of lesion(s)  Location: right forearm  Duration: 3 months  Symptoms: nonhealing  Relieving factors/Previous treatments: topical mupirocin    Started out as a rash that than grew slowly    Patient was last seen:Visit date not found     Prior notes by myself reviewed.   Clinical documentation obtained by nursing staff reviewed.    Review of Systems   Skin:  Positive for itching and rash.      Objective:    Physical Exam   Constitutional: She appears well-developed and well-nourished. No distress.   Neurological: She is alert and oriented to person, place, and time. She is not disoriented.   Psychiatric: She has a normal mood and affect.   Skin:   Areas Examined (abnormalities noted in diagram):   RUE Inspected            Diagram Legend     Erythematous scaling macule/papule c/w actinic keratosis       Vascular papule c/w angioma      Pigmented verrucoid papule/plaque c/w seborrheic keratosis      Yellow umbilicated papule c/w sebaceous hyperplasia      Irregularly shaped tan macule c/w lentigo     1-2 mm smooth white papules consistent with Milia      Movable subcutaneous cyst with punctum c/w epidermal inclusion cyst      Subcutaneous movable cyst c/w pilar cyst      Firm pink to brown papule c/w dermatofibroma      Pedunculated fleshy papule(s) c/w skin tag(s)      Evenly pigmented macule c/w junctional nevus     Mildly variegated pigmented, slightly irregular-bordered macule c/w mildly atypical nevus      Flesh colored to evenly pigmented papule c/w intradermal nevus       Pink pearly papule/plaque c/w basal cell  carcinoma      Erythematous hyperkeratotic cursted plaque c/w SCC      Surgical scar with no sign of skin cancer recurrence      Open and closed comedones      Inflammatory papules and pustules      Verrucoid papule consistent consistent with wart     Erythematous eczematous patches and plaques     Dystrophic onycholytic nail with subungual debris c/w onychomycosis     Umbilicated papule    Erythematous-base heme-crusted tan verrucoid plaque consistent with inflamed seborrheic keratosis     Erythematous Silvery Scaling Plaque c/w Psoriasis     See annotation          [] Data reviewed  [] Independent review of test  [] Management discussed with another provider    Assessment / Plan:      Pathology Orders:       Normal Orders This Visit    Specimen to Pathology, Dermatology     Comments:    Number of Specimens:->1  ------------------------->-------------------------  Spec 1 Procedure:->Biopsy  Spec 1 Clinical Impression:->LSC vs r/o NMSC  Spec 1 Source:->right forearm  Release to patient->Immediate    Questions:    Procedure Type: Dermatology and skin neoplasms    Number of Specimens: 1    ------------------------: -------------------------    Spec 1 Procedure: Biopsy    Spec 1 Clinical Impression: LSC vs r/o NMSC    Spec 1 Source: right forearm    Release to patient: Immediate          Neoplasm of uncertain behavior of skin  -     Specimen to Pathology, Dermatology  Punch biopsy procedure note:  Punch biopsy performed after verbal consent obtained. Area marked and prepped with alcohol. Approximately 1cc of 1% lidocaine with epinephrine injected. 4 mm disposable punch used to remove lesion. Hemostasis obtained and biopsy site closed with 3 prolene sutures. Wound care instructions reviewed with patient and handout given.             LOS NUMBER AND COMPLEXITY OF PROBLEMS    COMPLEXITY OF DATA RISK TOTAL TIME (m)   [id number] [] 1 self-limited or minor problem [] Minimal to none [] No treatment recommended or patient  to monitor 15-29  10-19   09823  14603 Low  [] 2 or > self limited or minor problems  [] 1 stable chronic illness  [] 1 acute, uncomplicated illness or injury Limited (2)  [] Prior external notes from each unique source  [] Review result of each unique test  [] Order each unique test []  Low  OTC medications, minor skin biopsy 30-44  20-29   45818  33882 Moderate  []  1 or > chronic illness with progression, exacerbation or SE of treatment  []  2 or more stable chronic illnesses  []  1 acute illness with systemic symptoms  []  1 acute complicated injury  []  1 undiagnosed new problem with uncertain prognosis Moderate (1/3 below)  []  3 or more data items        *Now includes assessment requiring independent historian  []  Independent interpretation of a test  []  Discuss management/test with another provider Moderate  []  Prescription drug mgmt  []  Minor surgery with risk discussed  []  Mgmt limited by social determinates 45-59  30-39   21260  52305 High  []  1 or more chronic illness with severe exacerbation, progression or SE of treatment  []  1 acute or chronic illness/injury that poses a threat to life or bodily function Extensive (2/3 below)  []  3 or more data items        *Now includes assessment requiring independent historian.  []  Independent interpretation of a test  []  Discuss management/test with another provider High  []  Major surgery with risk discussed  []  Drug therapy requiring intensive monitoring for toxicity  []  Hospitalization  []  Decision for DNR 60-74  40-54      No follow-ups on file.    Maria T Hines MD, FAAD  [facility]

## 2023-06-02 NOTE — PATIENT INSTRUCTIONS

## 2023-06-09 LAB
FINAL PATHOLOGIC DIAGNOSIS: NORMAL
GROSS: NORMAL
Lab: NORMAL
MICROSCOPIC EXAM: NORMAL

## 2023-09-13 ENCOUNTER — TELEPHONE (OUTPATIENT)
Dept: SURGERY | Facility: CLINIC | Age: 43
End: 2023-09-13
Payer: OTHER GOVERNMENT

## 2023-09-13 NOTE — TELEPHONE ENCOUNTER
Advised PT her appointment has been rescheduled to Tuesday 10/3/23 for 11:00 AM @ [facility]. PT verbally understood communication.

## 2023-09-26 ENCOUNTER — HOSPITAL ENCOUNTER (OUTPATIENT)
Dept: RADIOLOGY | Facility: HOSPITAL | Age: 43
Discharge: HOME | End: 2023-09-26
Attending: SURGERY
Payer: OTHER GOVERNMENT

## 2023-09-26 VITALS — WEIGHT: 158 LBS | BODY MASS INDEX: 27.99 KG/M2

## 2023-09-26 DIAGNOSIS — Z12.31 ENCOUNTER FOR SCREENING MAMMOGRAM FOR BREAST CANCER: ICD-10-CM

## 2023-09-26 PROCEDURE — 77067 SCR MAMMO BI INCL CAD: CPT | Mod: TC

## 2023-09-26 PROCEDURE — 77063 BREAST TOMOSYNTHESIS BI: CPT | Mod: 26,,, | Performed by: RADIOLOGY

## 2023-09-26 PROCEDURE — 77067 MAMMO DIGITAL SCREENING BILAT WITH TOMO: ICD-10-PCS | Mod: 26,,, | Performed by: RADIOLOGY

## 2023-09-26 PROCEDURE — 77063 MAMMO DIGITAL SCREENING BILAT WITH TOMO: ICD-10-PCS | Mod: 26,,, | Performed by: RADIOLOGY

## 2023-09-26 PROCEDURE — 77067 SCR MAMMO BI INCL CAD: CPT | Mod: 26,,, | Performed by: RADIOLOGY

## 2023-10-03 ENCOUNTER — OFFICE VISIT (OUTPATIENT)
Dept: SURGERY | Facility: CLINIC | Age: 43
End: 2023-10-03
Payer: OTHER GOVERNMENT

## 2023-10-03 DIAGNOSIS — Z91.89 AT HIGH RISK FOR BREAST CANCER: Primary | Chronic | ICD-10-CM

## 2023-10-03 PROCEDURE — 99999 PR PBB SHADOW E&M-EST. PATIENT-LVL I: CPT | Mod: PBBFAC,,, | Performed by: SURGERY

## 2023-10-03 PROCEDURE — 99213 PR OFFICE/OUTPT VISIT, EST, LEVL III, 20-29 MIN: ICD-10-PCS | Mod: S$GLB,,, | Performed by: SURGERY

## 2023-10-03 PROCEDURE — 99213 OFFICE O/P EST LOW 20 MIN: CPT | Mod: S$GLB,,, | Performed by: SURGERY

## 2023-10-03 PROCEDURE — 99999 PR PBB SHADOW E&M-EST. PATIENT-LVL I: ICD-10-PCS | Mod: PBBFAC,,, | Performed by: SURGERY

## 2023-10-03 NOTE — PROGRESS NOTES
Breast Surgery  Department of General Surgery   [facility]    Date of Service: 10/02/2023    DIAGNOSIS:   This is a 43 y.o. female with lifetime risk of breast cancer >20%.   Genetic testing: none    HISTORY OF PRESENT ILLNESS:   Paty Parham is a 43 y.o. female comes in for high-risk follow up.  She denies change in her breast self-exam specifically denying new masses, skin or nipple changes, or nipple discharge. Past medical and surgical history is updated with no new changes. There have been no changes to family history. The patient denies constitutional symptoms of night sweats, weight loss, new headaches, visual changes, new back or bony pain, chest pain, or shortness of breath.      She reports intermittent new right breast pain. She drinks caffeine daily and wears a supportive bra.     IMAGING:   MRI: BIRADS 1    Results for orders placed during the hospital encounter of 04/19/23    MRI Breast w/wo Contrast, w/CAD, Bilateral    Narrative  Result:  MRI Breast w/wo Contrast, w/CAD, Bilateral    History:  Patient is 42 y.o. and is seen for at high risk for breast cancer.      Films Compared:  Compared to: 09/15/2022 Mammo Digital Diagnostic Bilat with Arnav, 09/15/2022 US Breast Right Limited, 04/04/2022 Mammo Previous, 08/11/2021 Mammo Previous, and outside MRI Breast from 3/22/22    Technique:  A routine breast MRI was performed with a dedicated breast coil. Pre-contrast STIR were acquired. Then, pre and post contrast T1 weighted fat saturated images were acquired and subtracted with MIP reconstruction. 15 ml of intravenous gadolinium contrast was administered. The study was reviewed with [employer] software.    Findings:  The breasts have heterogeneous fibroglandular tissue. The background parenchymal enhancement is mild and symmetric.    No significant masses, enhancements, or other abnormal findings are seen.    Impression  No significant masses, enhancements, or other abnormal findings are  seen.    BI-RADS Category:  Overall: 1 - Negative    Recommendation:  Screening Breast MRI in 1 year is recommended for both breasts.    Your estimated lifetime risk of breast cancer (to age 85) based on Tyrer-Cuzick risk assessment model is Tyrer-Cuzick: 25.18 %. According to the American Cancer Society, patients with a lifetime breast cancer risk of 20% or higher might benefit from supplemental screening tests.    MMG: BIRADS 1    Results for orders placed during the hospital encounter of 09/26/23    Mammo Digital Screening Bilat w/ Arnav    Narrative  Result:  Mammo Digital Screening Bilat w/ Arnav    History:  Patient is 43 y.o. and is seen for a screening mammogram.      Films Compared:  Compared to: 09/15/2022 Mammo Digital Diagnostic Bilat with Arnav and 08/11/2021 Mammo Previous    Findings:  This procedure was performed using tomosynthesis.  Computer-aided detection was utilized in the interpretation of this examination.    The breasts are heterogeneously dense, which may obscure small masses. There is no evidence of suspicious masses, microcalcifications or architectural distortion.    Impression  No mammographic evidence of malignancy.    BI-RADS Category 1: Negative    Recommendation:  Routine screening mammogram in 1 year is recommended.    Your estimated lifetime risk of breast cancer (to age 85) based on Tyrer-Cuzick risk assessment model is 24.78 %.  According to the American Cancer Society, patients with a lifetime breast cancer risk of 20% or higher might benefit from supplemental screening tests.        PHYSICAL EXAM:   General: The patient appears well and is in no acute distress.   BREAST EXAM  No Asymmetry  Right:  - Mass: No  - Skin change: No  - Nipple Discharge: No  - Nipple retraction: No  - Axillary LAD: No  Left:   - Mass: No  - Skin change: No  - Nipple Discharge: No  - Nipple retraction: No  - Axillary LAD: No    ASSESSMENT:   This is a 43 y.o. female at high risk of breast cancer      PLAN:   Paty Parham has a normal breast exam today.   Continue self breast awareness. We discussed the possible signs and symptoms of breast cancer as lump, masses, new asymmetries, skin changes and nipple changes. She is encouraged to contact me if any new breast concerns arise.  Her supplemental imaging plan is April 2024  Mammogram in September.  Return to clinic in April. The patient is in agreement with the plan. Questions were encouraged and answered to patient's satisfaction. Paty will call our office with any questions or concerns.     We have reviewed that breast pain is overwhelmingly benign.  There is no national guideline that mandates routine mammography for the evaluation of generalized  cyclical breast pain.  We reviewed that reducing caffeine intake and vitamin D supplementation can improve the symptom of pain.  Other over the counter remedies include evening primrose oil, iodine supplementation, vitamin E and NSAIDS such as motrin.  She will try these interventions one-by-one to see if she any provide some relief.      I spent a total of 25 minutes on this visit. This includes face to face time and non-face to face time preparing to see the patient (eg, review of tests), obtaining and/or reviewing separately obtained history, documenting clinical information in the electronic or other health record, independently interpreting results and communicating results to the patient/family/caregiver, or care coordinator.      Elana Delarosa

## 2023-11-16 DIAGNOSIS — E06.3 HYPOTHYROIDISM DUE TO HASHIMOTO'S THYROIDITIS: Chronic | ICD-10-CM

## 2023-11-16 DIAGNOSIS — E03.8 HYPOTHYROIDISM DUE TO HASHIMOTO'S THYROIDITIS: Chronic | ICD-10-CM

## 2023-11-16 RX ORDER — LEVOTHYROXINE SODIUM 50 UG/1
50 TABLET ORAL
Qty: 90 TABLET | Refills: 0 | Status: SHIPPED | OUTPATIENT
Start: 2023-11-16 | End: 2024-02-13 | Stop reason: SDUPTHER

## 2023-11-16 NOTE — TELEPHONE ENCOUNTER
Provider Staff:  Action required for this patient    Requires labs      Please see care gap opportunities below in Care Due Message.    Thanks!  [facility]     Appointments      Date Provider   Last Visit   1/5/2023 HA Hunter MD   Next Visit   Visit date not found HA Hunter MD     Refill Decision Note   Paty Parham  is requesting a refill authorization.  Brief Assessment and Rationale for Refill:  Approve     Medication Therapy Plan:         Comments:     Note composed:2:08 PM 11/16/2023

## 2023-11-16 NOTE — TELEPHONE ENCOUNTER
Care Due:                  Date            Visit Type   Department     Provider  --------------------------------------------------------------------------------                                MYCHART                              FOLLOWUP/OF  [facility] INTERNAL  Last Visit: 01-      FICE VISIT   MEDICINE       Lucio Hunetr  Next Visit: None Scheduled  None         None Found                                                            Last  Test          Frequency    Reason                     Performed    Due Date  --------------------------------------------------------------------------------    K...........  12 months..  potassium................  02-   02-    TSH.........  12 months..  levothyroxine............  02-   02-    Health [facility] Embedded Care Due Messages. Reference number: [id number].   11/16/2023 11:21:28 AM CST

## 2024-01-09 ENCOUNTER — TELEPHONE (OUTPATIENT)
Dept: INTERNAL MEDICINE | Facility: CLINIC | Age: 44
End: 2024-01-09
Payer: OTHER GOVERNMENT

## 2024-01-22 ENCOUNTER — TELEPHONE (OUTPATIENT)
Dept: INTERNAL MEDICINE | Facility: CLINIC | Age: 44
End: 2024-01-22
Payer: OTHER GOVERNMENT

## 2024-01-30 DIAGNOSIS — F41.1 GENERALIZED ANXIETY DISORDER: Chronic | ICD-10-CM

## 2024-01-30 RX ORDER — CLONAZEPAM 0.5 MG/1
.25-.5 TABLET ORAL NIGHTLY PRN
Qty: 30 TABLET | Refills: 1 | Status: SHIPPED | OUTPATIENT
Start: 2024-01-30 | End: 2024-06-03 | Stop reason: SDUPTHER

## 2024-01-30 NOTE — TELEPHONE ENCOUNTER
Care Due:                  Date            Visit Type   Department     Provider  --------------------------------------------------------------------------------                                MYCHART                              FOLLOWUP/OF  [facility] INTERNAL  Last Visit: 01-      FICE VISIT   MEDICINE       Lucio Hunter                              EP -                              PRIMARY      [facility] INTERNAL  Next Visit: 03-      CARE ([facility])   MEDICINE       Lucio Hunter                                                            Last  Test          Frequency    Reason                     Performed    Due Date  --------------------------------------------------------------------------------    TSH.........  12 months..  levothyroxine............  02-   02-    Health [facility] Embedded Care Due Messages. Reference number: [id number].   1/30/2024 7:45:45 AM CST

## 2024-01-30 NOTE — TELEPHONE ENCOUNTER
REFILL APPROVED. Will address further refills at upcoming appointment with me listed below.  Requested Prescriptions   Pending Prescriptions Disp Refills    clonazePAM (KLONOPIN) 0.5 MG tablet 30 tablet 1     Sig: Take 0.5-1 tablets (0.25-0.5 mg total) by mouth nightly as needed (for anxiety or insomnia).    #LMRX   --------------------------------  Future Appointments   Date Time Provider Department Center   3/15/2024  8:20 AM [facility]   4/23/2024  9:30 AM LABORATORY[facility]   4/23/2024 10:00 AM [facility]   4/23/2024 11:00 AM Elana Delarosa MD HG BREAST [facility]

## 2024-02-12 DIAGNOSIS — F41.1 GENERALIZED ANXIETY DISORDER: Chronic | ICD-10-CM

## 2024-02-12 DIAGNOSIS — E03.8 HYPOTHYROIDISM DUE TO HASHIMOTO'S THYROIDITIS: Chronic | ICD-10-CM

## 2024-02-12 DIAGNOSIS — F33.41 MAJOR DEPRESSIVE DISORDER, RECURRENT, IN PARTIAL REMISSION: Chronic | ICD-10-CM

## 2024-02-12 DIAGNOSIS — E06.3 HYPOTHYROIDISM DUE TO HASHIMOTO'S THYROIDITIS: Chronic | ICD-10-CM

## 2024-02-12 NOTE — TELEPHONE ENCOUNTER
No care due was identified.  [facility] Embedded Care Due Messages. Reference number: [id number].   2/12/2024 5:10:54 PM CST

## 2024-02-13 DIAGNOSIS — F41.1 GENERALIZED ANXIETY DISORDER: Chronic | ICD-10-CM

## 2024-02-13 DIAGNOSIS — F33.41 MAJOR DEPRESSIVE DISORDER, RECURRENT, IN PARTIAL REMISSION: Chronic | ICD-10-CM

## 2024-02-13 DIAGNOSIS — E03.8 HYPOTHYROIDISM DUE TO HASHIMOTO'S THYROIDITIS: Chronic | ICD-10-CM

## 2024-02-13 DIAGNOSIS — E06.3 HYPOTHYROIDISM DUE TO HASHIMOTO'S THYROIDITIS: Chronic | ICD-10-CM

## 2024-02-13 NOTE — TELEPHONE ENCOUNTER
No care due was identified.  Health [facility] Embedded Care Due Messages. Reference number: [id number].   2/13/2024 1:05:08 PM CST

## 2024-02-14 RX ORDER — LEVOTHYROXINE SODIUM 50 UG/1
50 TABLET ORAL
Qty: 90 TABLET | Refills: 0 | OUTPATIENT
Start: 2024-02-14

## 2024-02-14 RX ORDER — BUPROPION HYDROCHLORIDE 300 MG/1
300 TABLET ORAL
Qty: 90 TABLET | Refills: 3 | OUTPATIENT
Start: 2024-02-14

## 2024-02-14 RX ORDER — LEVOTHYROXINE SODIUM 50 UG/1
50 TABLET ORAL
Qty: 90 TABLET | Refills: 0 | Status: SHIPPED | OUTPATIENT
Start: 2024-02-14 | End: 2024-05-13

## 2024-02-14 RX ORDER — BUPROPION HYDROCHLORIDE 300 MG/1
300 TABLET ORAL DAILY
Qty: 90 TABLET | Refills: 3 | Status: SHIPPED | OUTPATIENT
Start: 2024-02-14 | End: 2026-11-09

## 2024-02-14 NOTE — TELEPHONE ENCOUNTER
REFILL REQUEST APPROVED.  Requested Prescriptions   Pending Prescriptions Disp Refills    buPROPion (WELLBUTRIN XL) 300 MG 24 hr tablet 90 tablet 3     Sig: Take 1 tablet (300 mg total) by mouth once daily.    levothyroxine (SYNTHROID) 50 MCG tablet 90 tablet 0     Sig: Take 1 tablet (50 mcg total) by mouth before breakfast.

## 2024-02-14 NOTE — TELEPHONE ENCOUNTER
Refill Decision Note   Paty Parham  is requesting a refill authorization.  Brief Assessment and Rationale for Refill:  Quick Discontinue     Medication Therapy Plan: E-Prescribing Status: Receipt confirmed by pharmacy (2/14/2024  8:11 AM CST)      Comments:     Note composed:4:46 PM 02/14/2024

## 2024-03-15 ENCOUNTER — OFFICE VISIT (OUTPATIENT)
Dept: INTERNAL MEDICINE | Facility: CLINIC | Age: 44
End: 2024-03-15
Payer: OTHER GOVERNMENT

## 2024-03-15 VITALS
DIASTOLIC BLOOD PRESSURE: 70 MMHG | RESPIRATION RATE: 18 BRPM | WEIGHT: 186.94 LBS | OXYGEN SATURATION: 98 % | TEMPERATURE: 96 F | HEART RATE: 86 BPM | SYSTOLIC BLOOD PRESSURE: 100 MMHG | HEIGHT: 63 IN | BODY MASS INDEX: 33.12 KG/M2

## 2024-03-15 DIAGNOSIS — D75.1 POLYCYTHEMIA: ICD-10-CM

## 2024-03-15 DIAGNOSIS — Z13.220 ENCOUNTER FOR LIPID SCREENING FOR CARDIOVASCULAR DISEASE: ICD-10-CM

## 2024-03-15 DIAGNOSIS — E03.8 HYPOTHYROIDISM DUE TO HASHIMOTO'S THYROIDITIS: Primary | Chronic | ICD-10-CM

## 2024-03-15 DIAGNOSIS — E06.3 HYPOTHYROIDISM DUE TO HASHIMOTO'S THYROIDITIS: Primary | Chronic | ICD-10-CM

## 2024-03-15 DIAGNOSIS — Z13.6 ENCOUNTER FOR LIPID SCREENING FOR CARDIOVASCULAR DISEASE: ICD-10-CM

## 2024-03-15 DIAGNOSIS — E61.1 IRON DEFICIENCY: ICD-10-CM

## 2024-03-15 DIAGNOSIS — D32.9 MENINGIOMA: Chronic | ICD-10-CM

## 2024-03-15 DIAGNOSIS — Z13.1 SCREENING FOR DIABETES MELLITUS: ICD-10-CM

## 2024-03-15 PROCEDURE — 99999 PR PBB SHADOW E&M-EST. PATIENT-LVL IV: CPT | Mod: PBBFAC,,, | Performed by: FAMILY MEDICINE

## 2024-03-15 PROCEDURE — 99396 PREV VISIT EST AGE 40-64: CPT | Mod: S$GLB,,, | Performed by: FAMILY MEDICINE

## 2024-03-15 NOTE — PROGRESS NOTES
ANNUAL WELLNESS VISIT (PREVENTIVE SERVICES)  3/15/24  8:20 AM CDT    CHIEF COMPLAINT: Annual Exam and Labs Only    HEALTH MAINTENANCE INTERVENTIONS - UP TO DATE  Health Maintenance Topics with due status: Not Due       Topic Last Completion Date    TETANUS VACCINE 10/12/2015    Mammogram 09/26/2023    Cervical Cancer Screening 12/01/2023    Hemoglobin A1c (Diabetic Prevention Screening) 03/16/2024     HEALTH MAINTENANCE INTERVENTIONS - DUE OR DUE SOON  Health Maintenance Due   Topic Date Due    Influenza Vaccine (1) 09/01/2023    COVID-19 Vaccine (4 - 2023-24 season) 09/01/2023      History of Present Illness    Paty presents today suspecting menopause and thyroid irregularities.    Patient reports having only one menstrual cycle in the past year and recalls her mother starting menopause in her mid-40s. Her gynecologist suggested she may be entering menopause.    She reports feeling persistently cold for several months which she asserts is a recent change. She is on levothyroxine for her previously diagnosed thyroid condition which historically has shown stable thyroid numbers.    She was found to have slightly low iron levels around three years ago and was previously on iron treatment.    She is due for routine screenings including a diabetes screening and a hemoglobin A1c test. An MRI is scheduled in April for breast cancer screening and another MRI related to her previously diagnosed stable meningioma is due in the fall.    She has been vaccinated for COVID-19 but has not received this year's flu vaccine.    The patient has requested a potassium check.    She works from home and has limited contact with other individuals.       Assessment & Plan    POTENTIAL PERIMENOPAUSE AND THYROID-RELATED ISSUES:   Addressed the patient's concerns about feeling cold and potential perimenopause, along with thyroid-related issues.   Planned further evaluation of these concerns.   Ordered a comprehensive thyroid panel, including  "TSH, free T4, and free T3, to assess thyroid function.  METABOLIC AND ELECTROLYTE IMBALANCE:   Prescribed a comprehensive metabolic panel to evaluate levels of potassium, sodium, chloride, calcium, other electrolytes, sugar, and protein.  ANEMIA AND DIABETES SCREENING:   Scheduled a complete blood count, iron level check, and a hemoglobin A1c test for diabetes screening.  HYPERLIPIDEMIA AND DIABETES SCREENING:   Arranged a lipid panel and a diabetes screening test.  BREAST CANCER AND MENINGIOMA SCREENING:   Scheduled a follow-up with Dr. Syed for breast cancer screening and an MRI for meningioma.  GENERAL HEALTH CHECK-UP:   Organized a yearly appointment for a general check-up.  DIABETES EDUCATION:   Educated the patient on the differences between type 1, type 2, and type 1.5 diabetes.   Explained that type 1 diabetes is an autoimmune condition often seen in younger, leaner individuals, where the pancreas ceases insulin production.   Clarified that type 2 diabetes is a condition characterized by inefficient insulin use, leading to elevated blood sugar, and necessitating medication for glucose control.   Introduced the concept of type 1.5 diabetes, a hybrid condition where individuals initially diagnosed with type 2 diabetes stop responding to treatment and exhibit autoimmune antibodies against the beta cells in their pancreas.          1. Hypothyroidism due to Hashimoto's thyroiditis  Assessment & Plan:  Lab Results   Component Value Date    TSH 1.383 02/10/2023    TSH 2.262 03/03/2022    TSH 1.980 04/15/2021    FREET4 0.97 02/10/2023    FREET4 1.07 03/03/2022     No results found for: "THYROIDSTIMI", "THYROPEROXID", "THYGLBTUM", "THGABSCRN", "THYRGINTERP"     Orders:  -     T3; Future; Expected date: 03/15/2024  -     T4, Free; Future; Expected date: 03/15/2024  -     TSH; Future; Expected date: 03/15/2024    2. Iron deficiency  -     CBC Auto Differential; Future; Expected date: 03/15/2024  -     Ferritin; Future; " "Expected date: 03/15/2024  -     Iron and TIBC; Future; Expected date: 03/15/2024    3. Polycythemia  -     CBC Auto Differential; Future; Expected date: 03/15/2024  -     Ferritin; Future; Expected date: 03/15/2024  -     Iron and TIBC; Future; Expected date: 03/15/2024    4. Meningioma, left temporal region  Overview:  NEUROSURGEON: Dr. Zhu  NEUROLOGIST: Dr. Satya Carias      5. Encounter for lipid screening for cardiovascular disease  -     Lipid Panel; Future; Expected date: 03/15/2024    6. Screening for diabetes mellitus  -     Hemoglobin A1C; Future; Expected date: 03/15/2024      Vitals:    03/15/24 0801   BP: 100/70   BP Location: Left arm   Patient Position: Sitting   BP Method: Large (Manual)   Pulse: 86   Resp: 18   Temp: (!) 95.5 °F (35.3 °C)   SpO2: 98%   Weight: 84.8 kg (186 lb 15.2 oz)   Height: 5' 3" (1.6 m)   Physical Exam  Vitals reviewed.   Constitutional:       General: She is not in acute distress.     Appearance: Normal appearance.   Eyes:      General: No scleral icterus.     Conjunctiva/sclera: Conjunctivae normal.   Neck:      Vascular: No carotid bruit.   Cardiovascular:      Rate and Rhythm: Normal rate and regular rhythm.      Heart sounds: Normal heart sounds.   Pulmonary:      Effort: Pulmonary effort is normal. No respiratory distress.      Breath sounds: Normal breath sounds. No wheezing or rhonchi.   Abdominal:      General: Bowel sounds are normal. There is no distension.      Palpations: Abdomen is soft. There is no mass.      Tenderness: There is no abdominal tenderness.   Lymphadenopathy:      Cervical: No cervical adenopathy.   Skin:     General: Skin is warm and dry.      Coloration: Skin is not jaundiced.   Neurological:      General: No focal deficit present.      Mental Status: She is alert and oriented to person, place, and time.   Psychiatric:         Mood and Affect: Mood normal.         Behavior: Behavior normal.         Judgment: Judgment normal.       This note " "was generated with the assistance of ambient listening technology. Verbal consent was obtained by the patient and accompanying visitor(s) for the recording of patient appointment to facilitate this note. I attest to having reviewed and edited the generated note for accuracy, though some syntax or spelling errors may persist. Please contact the author of this note for any clarification.    Documentation entered by me for this encounter may have been done in part using speech-recognition technology. Although I have made an effort to ensure accuracy, "sound like" errors may exist and should be interpreted in context.    "

## 2024-03-15 NOTE — ASSESSMENT & PLAN NOTE
"Lab Results   Component Value Date    TSH 1.383 02/10/2023    TSH 2.262 03/03/2022    TSH 1.980 04/15/2021    FREET4 0.97 02/10/2023    FREET4 1.07 03/03/2022     No results found for: "THYROIDSTIMI", "THYROPEROXID", "THYGLBTUM", "THGABSCRN", "THYRGINTERP"   "

## 2024-03-16 ENCOUNTER — LAB VISIT (OUTPATIENT)
Dept: LAB | Facility: HOSPITAL | Age: 44
End: 2024-03-16
Attending: FAMILY MEDICINE
Payer: OTHER GOVERNMENT

## 2024-03-16 DIAGNOSIS — Z13.220 ENCOUNTER FOR LIPID SCREENING FOR CARDIOVASCULAR DISEASE: ICD-10-CM

## 2024-03-16 DIAGNOSIS — E61.1 IRON DEFICIENCY: ICD-10-CM

## 2024-03-16 DIAGNOSIS — D75.1 POLYCYTHEMIA: ICD-10-CM

## 2024-03-16 DIAGNOSIS — Z13.1 SCREENING FOR DIABETES MELLITUS: ICD-10-CM

## 2024-03-16 DIAGNOSIS — E03.8 HYPOTHYROIDISM DUE TO HASHIMOTO'S THYROIDITIS: Chronic | ICD-10-CM

## 2024-03-16 DIAGNOSIS — E06.3 HYPOTHYROIDISM DUE TO HASHIMOTO'S THYROIDITIS: Chronic | ICD-10-CM

## 2024-03-16 DIAGNOSIS — Z13.6 ENCOUNTER FOR LIPID SCREENING FOR CARDIOVASCULAR DISEASE: ICD-10-CM

## 2024-03-16 LAB
BASOPHILS # BLD AUTO: 0.04 K/UL (ref 0–0.2)
BASOPHILS NFR BLD: 0.5 % (ref 0–1.9)
CHOLEST SERPL-MCNC: 185 MG/DL (ref 120–199)
CHOLEST/HDLC SERPL: 3.5 {RATIO} (ref 2–5)
DIFFERENTIAL METHOD BLD: ABNORMAL
EOSINOPHIL # BLD AUTO: 0.3 K/UL (ref 0–0.5)
EOSINOPHIL NFR BLD: 3.6 % (ref 0–8)
ERYTHROCYTE [DISTWIDTH] IN BLOOD BY AUTOMATED COUNT: 13.9 % (ref 11.5–14.5)
ESTIMATED AVG GLUCOSE: 100 MG/DL (ref 68–131)
FERRITIN SERPL-MCNC: 108 NG/ML (ref 20–300)
HBA1C MFR BLD: 5.1 % (ref 4–5.6)
HCT VFR BLD AUTO: 45 % (ref 37–48.5)
HDLC SERPL-MCNC: 53 MG/DL (ref 40–75)
HDLC SERPL: 28.6 % (ref 20–50)
HGB BLD-MCNC: 14.2 G/DL (ref 12–16)
IMM GRANULOCYTES # BLD AUTO: 0.02 K/UL (ref 0–0.04)
IMM GRANULOCYTES NFR BLD AUTO: 0.3 % (ref 0–0.5)
IRON SERPL-MCNC: 83 UG/DL (ref 30–160)
LDLC SERPL CALC-MCNC: 106.4 MG/DL (ref 63–159)
LYMPHOCYTES # BLD AUTO: 2.5 K/UL (ref 1–4.8)
LYMPHOCYTES NFR BLD: 32.6 % (ref 18–48)
MCH RBC QN AUTO: 30.7 PG (ref 27–31)
MCHC RBC AUTO-ENTMCNC: 31.6 G/DL (ref 32–36)
MCV RBC AUTO: 97 FL (ref 82–98)
MONOCYTES # BLD AUTO: 0.5 K/UL (ref 0.3–1)
MONOCYTES NFR BLD: 6.3 % (ref 4–15)
NEUTROPHILS # BLD AUTO: 4.4 K/UL (ref 1.8–7.7)
NEUTROPHILS NFR BLD: 56.7 % (ref 38–73)
NONHDLC SERPL-MCNC: 132 MG/DL
NRBC BLD-RTO: 0 /100 WBC
PLATELET # BLD AUTO: 387 K/UL (ref 150–450)
PMV BLD AUTO: 10.1 FL (ref 9.2–12.9)
RBC # BLD AUTO: 4.63 M/UL (ref 4–5.4)
SATURATED IRON: 20 % (ref 20–50)
T3 SERPL-MCNC: 94 NG/DL (ref 60–180)
T4 FREE SERPL-MCNC: 0.88 NG/DL (ref 0.71–1.51)
TOTAL IRON BINDING CAPACITY: 410 UG/DL (ref 250–450)
TRANSFERRIN SERPL-MCNC: 277 MG/DL (ref 200–375)
TRIGL SERPL-MCNC: 128 MG/DL (ref 30–150)
TSH SERPL DL<=0.005 MIU/L-ACNC: 1.74 UIU/ML (ref 0.4–4)
WBC # BLD AUTO: 7.77 K/UL (ref 3.9–12.7)

## 2024-03-16 PROCEDURE — 82728 ASSAY OF FERRITIN: CPT | Performed by: FAMILY MEDICINE

## 2024-03-16 PROCEDURE — 83540 ASSAY OF IRON: CPT | Performed by: FAMILY MEDICINE

## 2024-03-16 PROCEDURE — 80061 LIPID PANEL: CPT | Performed by: FAMILY MEDICINE

## 2024-03-16 PROCEDURE — 83036 HEMOGLOBIN GLYCOSYLATED A1C: CPT | Performed by: FAMILY MEDICINE

## 2024-03-16 PROCEDURE — 84443 ASSAY THYROID STIM HORMONE: CPT | Performed by: FAMILY MEDICINE

## 2024-03-16 PROCEDURE — 84439 ASSAY OF FREE THYROXINE: CPT | Performed by: FAMILY MEDICINE

## 2024-03-16 PROCEDURE — 84480 ASSAY TRIIODOTHYRONINE (T3): CPT | Performed by: FAMILY MEDICINE

## 2024-03-16 PROCEDURE — 85025 COMPLETE CBC W/AUTO DIFF WBC: CPT | Performed by: FAMILY MEDICINE

## 2024-03-16 PROCEDURE — 36415 COLL VENOUS BLD VENIPUNCTURE: CPT | Performed by: FAMILY MEDICINE

## 2024-04-23 ENCOUNTER — HOSPITAL ENCOUNTER (OUTPATIENT)
Dept: RADIOLOGY | Facility: HOSPITAL | Age: 44
Discharge: HOME | End: 2024-04-23
Attending: SURGERY
Payer: OTHER GOVERNMENT

## 2024-04-23 DIAGNOSIS — Z91.89 AT HIGH RISK FOR BREAST CANCER: Chronic | ICD-10-CM

## 2024-04-23 PROCEDURE — 25500020 PHARM REV CODE 255: Performed by: SURGERY

## 2024-04-23 PROCEDURE — 77049 MRI BREAST C-+ W/CAD BI: CPT | Mod: TC

## 2024-04-23 PROCEDURE — 77049 MRI BREAST C-+ W/CAD BI: CPT | Mod: 26,,, | Performed by: RADIOLOGY

## 2024-04-23 PROCEDURE — A9577 INJ MULTIHANCE: HCPCS | Performed by: SURGERY

## 2024-04-23 RX ADMIN — GADOBENATE DIMEGLUMINE 15 ML: 529 INJECTION, SOLUTION INTRAVENOUS at 10:04

## 2024-04-29 NOTE — PROGRESS NOTES
Breast Surgery  Department of General Surgery   [facility]    Date of Service: 04/29/2024    DIAGNOSIS:   This is a 43 y.o. female with lifetime risk of breast cancer >20%.   Genetic testing: none    HISTORY OF PRESENT ILLNESS:   Paty Parham is a 43 y.o. female comes in for high-risk follow up.  She denies change in her breast self-exam specifically denying new masses, skin or nipple changes, or nipple discharge. Past medical and surgical history is updated with no new changes. There have been no changes to family history. The patient denies constitutional symptoms of night sweats, weight loss, new headaches, visual changes, new back or bony pain, chest pain, or shortness of breath.      She reports amenorrhea.     IMAGING:   MRI: BIRADS 1    Results for orders placed during the hospital encounter of 04/19/23    MRI Breast w/wo Contrast, w/CAD, Bilateral    Narrative  Result:  MRI Breast w/wo Contrast, w/CAD, Bilateral    History:  Patient is 42 y.o. and is seen for at high risk for breast cancer.      Films Compared:  Compared to: 09/15/2022 Mammo Digital Diagnostic Bilat with Arnav, 09/15/2022 US Breast Right Limited, 04/04/2022 Mammo Previous, 08/11/2021 Mammo Previous, and outside MRI Breast from 3/22/22    Technique:  A routine breast MRI was performed with a dedicated breast coil. Pre-contrast STIR were acquired. Then, pre and post contrast T1 weighted fat saturated images were acquired and subtracted with MIP reconstruction. 15 ml of intravenous gadolinium contrast was administered. The study was reviewed with [employer] software.    Findings:  The breasts have heterogeneous fibroglandular tissue. The background parenchymal enhancement is mild and symmetric.    No significant masses, enhancements, or other abnormal findings are seen.    Impression  No significant masses, enhancements, or other abnormal findings are seen.    BI-RADS Category:  Overall: 1 - Negative    Recommendation:  Screening Breast MRI  in 1 year is recommended for both breasts.    Your estimated lifetime risk of breast cancer (to age 85) based on Tyrer-Cuzick risk assessment model is Tyrer-Cuzick: 25.18 %. According to the American Cancer Society, patients with a lifetime breast cancer risk of 20% or higher might benefit from supplemental screening tests.    MMG: BIRADS 1    Results for orders placed during the hospital encounter of 09/26/23    Mammo Digital Screening Bilat w/ Arnav    Narrative  Result:  Mammo Digital Screening Bilat w/ Arnav    History:  Patient is 43 y.o. and is seen for a screening mammogram.      Films Compared:  Compared to: 09/15/2022 Mammo Digital Diagnostic Bilat with Arnav and 08/11/2021 Mammo Previous    Findings:  This procedure was performed using tomosynthesis.  Computer-aided detection was utilized in the interpretation of this examination.    The breasts are heterogeneously dense, which may obscure small masses. There is no evidence of suspicious masses, microcalcifications or architectural distortion.    Impression  No mammographic evidence of malignancy.    BI-RADS Category 1: Negative    Recommendation:  Routine screening mammogram in 1 year is recommended.    Your estimated lifetime risk of breast cancer (to age 85) based on Tyrer-Cuzick risk assessment model is 24.78 %.  According to the American Cancer Society, patients with a lifetime breast cancer risk of 20% or higher might benefit from supplemental screening tests.        PHYSICAL EXAM:   General: The patient appears well and is in no acute distress.   BREAST EXAM  No Asymmetry  Right:  - Mass: No  - Skin change: No  - Nipple Discharge: No  - Nipple retraction: No  - Axillary LAD: No  Left:   - Mass: No  - Skin change: No  - Nipple Discharge: No  - Nipple retraction: No  - Axillary LAD: No    ASSESSMENT:   This is a 43 y.o. female at high risk of breast cancer     PLAN:   Paty Parham has a normal breast exam today.   Continue self breast awareness.  We discussed the possible signs and symptoms of breast cancer as lump, masses, new asymmetries, skin changes and nipple changes. She is encouraged to contact me if any new breast concerns arise.  Her supplemental imaging plan is April 2024  Mammogram in September.  Return to clinic in September. The patient is in agreement with the plan. Questions were encouraged and answered to patient's satisfaction. Paty will call our office with any questions or concerns.     I spent a total of 30 minutes on this visit. This includes face to face time and non-face to face time preparing to see the patient (eg, review of tests), obtaining and/or reviewing separately obtained history, documenting clinical information in the electronic or other health record, independently interpreting results and communicating results to the patient/family/caregiver, or care coordinator.      Elana Delarosa

## 2024-04-30 ENCOUNTER — OFFICE VISIT (OUTPATIENT)
Dept: SURGERY | Facility: CLINIC | Age: 44
End: 2024-04-30
Payer: OTHER GOVERNMENT

## 2024-04-30 DIAGNOSIS — Z91.89 AT HIGH RISK FOR BREAST CANCER: Chronic | ICD-10-CM

## 2024-04-30 DIAGNOSIS — R92.30 DENSE BREAST TISSUE: ICD-10-CM

## 2024-04-30 DIAGNOSIS — Z12.31 ENCOUNTER FOR SCREENING MAMMOGRAM FOR BREAST CANCER: Primary | ICD-10-CM

## 2024-04-30 PROCEDURE — 99214 OFFICE O/P EST MOD 30 MIN: CPT | Mod: S$GLB,,, | Performed by: SURGERY

## 2024-04-30 PROCEDURE — 99999 PR PBB SHADOW E&M-EST. PATIENT-LVL I: CPT | Mod: PBBFAC,,, | Performed by: SURGERY

## 2024-05-13 DIAGNOSIS — E06.3 HYPOTHYROIDISM DUE TO HASHIMOTO'S THYROIDITIS: Chronic | ICD-10-CM

## 2024-05-13 DIAGNOSIS — E03.8 HYPOTHYROIDISM DUE TO HASHIMOTO'S THYROIDITIS: Chronic | ICD-10-CM

## 2024-05-13 RX ORDER — LEVOTHYROXINE SODIUM 50 UG/1
50 TABLET ORAL
Qty: 90 TABLET | Refills: 3 | Status: SHIPPED | OUTPATIENT
Start: 2024-05-13

## 2024-05-13 NOTE — TELEPHONE ENCOUNTER
Refill Decision Note   Paty Parham  is requesting a refill authorization.  Brief Assessment and Rationale for Refill:  Approve     Medication Therapy Plan:         Comments:     Note composed:1:14 PM 05/13/2024

## 2024-05-13 NOTE — TELEPHONE ENCOUNTER
No care due was identified.  [facility] Embedded Care Due Messages. Reference number: [id number].   5/13/2024 5:42:09 AM CDT

## 2024-06-03 DIAGNOSIS — F41.1 GENERALIZED ANXIETY DISORDER: Chronic | ICD-10-CM

## 2024-06-03 NOTE — TELEPHONE ENCOUNTER
No care due was identified.  [facility] Embedded Care Due Messages. Reference number: [id number].   6/03/2024 9:46:53 AM CDT

## 2024-06-04 RX ORDER — CLONAZEPAM 0.5 MG/1
.25-.5 TABLET ORAL NIGHTLY PRN
Qty: 30 TABLET | Refills: 1 | Status: SHIPPED | OUTPATIENT
Start: 2024-06-04 | End: 2025-06-04

## 2024-06-04 NOTE — TELEPHONE ENCOUNTER
REFILL REQUEST APPROVED.  Requested Prescriptions   Pending Prescriptions Disp Refills    clonazePAM (KLONOPIN) 0.5 MG tablet 30 tablet 1     Sig: Take 0.5-1 tablets (0.25-0.5 mg total) by mouth nightly as needed (for anxiety or insomnia).

## 2024-08-06 ENCOUNTER — PATIENT MESSAGE (OUTPATIENT)
Dept: SURGERY | Facility: CLINIC | Age: 44
End: 2024-08-06
Payer: OTHER GOVERNMENT

## 2024-09-10 NOTE — PROGRESS NOTES
Breast Surgery  Department of General Surgery   [facility]    Date of Service: 09/10/2024    DIAGNOSIS:   This is a 44 y.o. female with lifetime risk of breast cancer >20%.   Genetic testing: none    HISTORY OF PRESENT ILLNESS:   Paty Parham is a 44 y.o. female comes in for high-risk follow up.      Interval History:    She denies change in her breast self-exam specifically denying new masses, skin or nipple changes, or nipple discharge.     Past medical and surgical history is updated with no new changes.     There have been no changes to family history.     The patient denies constitutional symptoms of night sweats, weight loss, new headaches, visual changes, new back or bony pain, chest pain, or shortness of breath.      She reports amenorrhea.     She was identified for high risk evaluation by her GYN Dr. Castelan in , for which she was entered into a high risk clinic at [facility].  She began screening mammograms at age 40. At which time, a benign mass in her right breast at 3:00 4 CMFN was identified. This was followed with 3D mammo/US at a 6 month interval and has been stable. She underwent high risk screening breast MRI in  which showed the right breast benign mass at 3:00 4 CMFN and a 0.5 cm mass in the upper outer quadrant right breast, both with with benign kinetics. Annual MRI in  revealed a new lower inner quadrant mass, confirmed biopsy + fibroadenoma.     She currently is of a healthy weight, does not drink alcohol.      Her breast cancer risk factor profile is as follows: Menarche at 10, Menopause at N/A.  She is .  HRT: no     Other breast cancer risk factors include family hx on father's side, nulliparous, menarche before age 12, family hx of ovarian CA and family hx of Pancreatic CA. Her family history cancer profile is: paternal grandmother with breast cancer at age 42 and ovarian cancer at age 89, currently living; maternal grandmother with pancreatic cancer  at age 81,  at age 81; paternal aunt with melanoma at age 55, currently living.          IMAGIN2024- MRI breasts WNL - 24.78%  Mammo today- results pending- risk score 23.43%    PHYSICAL EXAM:   General: The patient appears well and is in no acute distress.   BREAST EXAM  No Asymmetry  Right:  - Mass: No  - Skin change: No  - Nipple Discharge: No  - Nipple retraction: No  - Axillary LAD: No  Left:   - Mass: No  - Skin change: No  - Nipple Discharge: No  - Nipple retraction: No  - Axillary LAD: No    ASSESSMENT:   This is a 44 y.o. female at high risk of breast cancer     PLAN:   Paty Parham has a normal breast exam today.   Imaging today- nae mammo results pending- risk score 23.43 %- will communicate results over pt portal  Continue self breast awareness. We discussed the possible signs and symptoms of breast cancer as lump, masses, new asymmetries, skin changes and nipple changes. She is encouraged to contact me if any new breast concerns arise.  Her supplemental imaging plan is 2025- nae breast MRI - pt will think about this- may skip next year  Mammogram each October.  Return to clinic in 2025. The patient is in agreement with the plan. Questions were encouraged and answered to patient's satisfaction. Paty will call our office with any questions or concerns.     I spent a total of 30 minutes on this visit. This includes face to face time and non-face to face time preparing to see the patient (eg, review of tests), obtaining and/or reviewing separately obtained history, documenting clinical information in the electronic or other health record, independently interpreting results and communicating results to the patient/family/caregiver, or care coordinator.      Katelynn Wu

## 2024-10-01 ENCOUNTER — OFFICE VISIT (OUTPATIENT)
Dept: SURGERY | Facility: CLINIC | Age: 44
End: 2024-10-01
Payer: OTHER GOVERNMENT

## 2024-10-01 ENCOUNTER — HOSPITAL ENCOUNTER (OUTPATIENT)
Dept: RADIOLOGY | Facility: HOSPITAL | Age: 44
Discharge: HOME | End: 2024-10-01
Attending: SURGERY
Payer: OTHER GOVERNMENT

## 2024-10-01 VITALS — WEIGHT: 185.44 LBS | RESPIRATION RATE: 16 BRPM | HEIGHT: 63 IN | BODY MASS INDEX: 32.86 KG/M2

## 2024-10-01 DIAGNOSIS — Z91.89 AT HIGH RISK FOR BREAST CANCER: Primary | ICD-10-CM

## 2024-10-01 DIAGNOSIS — R92.30 DENSE BREAST TISSUE: ICD-10-CM

## 2024-10-01 DIAGNOSIS — Z12.31 ENCOUNTER FOR SCREENING MAMMOGRAM FOR BREAST CANCER: ICD-10-CM

## 2024-10-01 PROCEDURE — 99999 PR PBB SHADOW E&M-EST. PATIENT-LVL III: CPT | Mod: PBBFAC,,, | Performed by: NURSE PRACTITIONER

## 2024-10-01 PROCEDURE — 77063 BREAST TOMOSYNTHESIS BI: CPT | Mod: 26,,, | Performed by: RADIOLOGY

## 2024-10-01 PROCEDURE — 99214 OFFICE O/P EST MOD 30 MIN: CPT | Mod: S$GLB,,, | Performed by: NURSE PRACTITIONER

## 2024-10-01 PROCEDURE — 77067 SCR MAMMO BI INCL CAD: CPT | Mod: TC

## 2024-10-01 PROCEDURE — 77067 SCR MAMMO BI INCL CAD: CPT | Mod: 26,,, | Performed by: RADIOLOGY

## 2024-10-02 ENCOUNTER — PATIENT MESSAGE (OUTPATIENT)
Dept: SURGERY | Facility: CLINIC | Age: 44
End: 2024-10-02
Payer: OTHER GOVERNMENT

## 2025-04-03 ENCOUNTER — TELEPHONE (OUTPATIENT)
Dept: SURGERY | Facility: CLINIC | Age: 45
End: 2025-04-03
Payer: OTHER GOVERNMENT

## 2025-04-03 NOTE — TELEPHONE ENCOUNTER
Spoke with pt in ref to rescheduling MRI, due to turn around time for pre services to receive an auth. Informed pt that MRI was needing to be rescheduled, and that she will see the new date and time on her mychart. Pt verbalized understanding. Tried calling pt back to inform of her rescheduled exam appt.that was also rescheduled to later after her imaging, was not reachable. No answer.

## 2025-04-22 NOTE — ASSESSMENT & PLAN NOTE
Asymptomatic. Improved.  Lab Results   Component Value Date     05/18/2021     (H) 04/15/2021     (H) 06/24/2020     (H) 12/20/2019     Lab Results   Component Value Date    WBC 7.98 05/18/2021    HGB 15.1 05/18/2021    HCT 45.6 05/18/2021    MCV 93 05/18/2021     05/18/2021             Social Drivers of Health     Financial Resource Strain: Low Risk  (9/24/2024)    Overall Financial Resource Strain (CARDIA)     Difficulty of Paying Living Expenses: Not hard at all   Food Insecurity: No Food Insecurity (9/24/2024)    Hunger Vital Sign     Worried About Running Out of Food in the Last Year: Never true     Ran Out of Food in the Last Year: Never true   Transportation Needs: No Transportation Needs (9/24/2024)    PRAPARE - Transportation     Lack of Transportation (Medical): No     Lack of Transportation (Non-Medical): No   Physical Activity: Not on file   Stress: Not on file   Social Connections: Not on file   Intimate Partner Violence: Not on file   Housing Stability: Low Risk  (9/24/2024)    Housing Stability Vital Sign     Unable to Pay for Housing in the Last Year: No     Number of Times Moved in the Last Year: 1     Homeless in the Last Year: No       REVIEW OF SYSTEMS: A 12-point review of systemswas obtained and pertinent positives and negatives were enumerated above in the history of present illness. All other reviewed systems / symptoms were negative.    Review of Systems   Constitutional:  Negative for appetite change and fatigue.   HENT:  Negative for sore throat and trouble swallowing.    Eyes:  Negative for visual disturbance.   Respiratory:  Negative for cough, choking, shortness of breath and wheezing.    Cardiovascular:  Negative for chest pain, palpitations and leg swelling.   Gastrointestinal:  Positive for abdominal pain. Negative for abdominal distention, anal bleeding, blood in stool, constipation (comes/goes- taking milk of magnesia, working well), diarrhea, nausea, rectal pain and vomiting.   Skin:  Negative for color change.   Allergic/Immunologic: Negative for environmental allergies and food allergies.   Neurological:  Negative for dizziness, weakness, numbness and headaches.   Hematological:  Does not bruise/bleed easily.   Psychiatric/Behavioral:  Negative for

## 2025-05-09 ENCOUNTER — OFFICE VISIT (OUTPATIENT)
Dept: INTERNAL MEDICINE | Facility: CLINIC | Age: 45
End: 2025-05-09
Payer: OTHER GOVERNMENT

## 2025-05-09 ENCOUNTER — HOSPITAL ENCOUNTER (OUTPATIENT)
Dept: RADIOLOGY | Facility: HOSPITAL | Age: 45
Discharge: HOME | End: 2025-05-09
Attending: NURSE PRACTITIONER
Payer: OTHER GOVERNMENT

## 2025-05-09 VITALS
SYSTOLIC BLOOD PRESSURE: 118 MMHG | TEMPERATURE: 97 F | DIASTOLIC BLOOD PRESSURE: 72 MMHG | WEIGHT: 173.06 LBS | HEART RATE: 82 BPM | RESPIRATION RATE: 16 BRPM | HEIGHT: 63 IN | OXYGEN SATURATION: 98 % | BODY MASS INDEX: 30.66 KG/M2

## 2025-05-09 DIAGNOSIS — Z00.00 ANNUAL PHYSICAL EXAM: Primary | ICD-10-CM

## 2025-05-09 DIAGNOSIS — E66.811 CLASS 1 OBESITY WITH BODY MASS INDEX (BMI) OF 30.0 TO 30.9 IN ADULT, UNSPECIFIED OBESITY TYPE, UNSPECIFIED WHETHER SERIOUS COMORBIDITY PRESENT: ICD-10-CM

## 2025-05-09 DIAGNOSIS — Z91.89 AT HIGH RISK FOR BREAST CANCER: ICD-10-CM

## 2025-05-09 DIAGNOSIS — F33.41 MAJOR DEPRESSIVE DISORDER, RECURRENT, IN PARTIAL REMISSION: Chronic | ICD-10-CM

## 2025-05-09 DIAGNOSIS — R92.30 DENSE BREAST TISSUE: ICD-10-CM

## 2025-05-09 DIAGNOSIS — E87.6 HYPOKALEMIA: Chronic | ICD-10-CM

## 2025-05-09 DIAGNOSIS — E78.2 MODERATE MIXED HYPERLIPIDEMIA NOT REQUIRING STATIN THERAPY: Chronic | ICD-10-CM

## 2025-05-09 DIAGNOSIS — E06.3 HYPOTHYROIDISM DUE TO HASHIMOTO'S THYROIDITIS: Chronic | ICD-10-CM

## 2025-05-09 DIAGNOSIS — F41.1 GENERALIZED ANXIETY DISORDER: Chronic | ICD-10-CM

## 2025-05-09 PROCEDURE — 25500020 PHARM REV CODE 255: Performed by: NURSE PRACTITIONER

## 2025-05-09 PROCEDURE — 77049 MRI BREAST C-+ W/CAD BI: CPT | Mod: 26,,, | Performed by: RADIOLOGY

## 2025-05-09 PROCEDURE — 77049 MRI BREAST C-+ W/CAD BI: CPT | Mod: TC

## 2025-05-09 PROCEDURE — 99999 PR PBB SHADOW E&M-EST. PATIENT-LVL IV: CPT | Mod: PBBFAC,,, | Performed by: NURSE PRACTITIONER

## 2025-05-09 PROCEDURE — A9577 INJ MULTIHANCE: HCPCS | Performed by: NURSE PRACTITIONER

## 2025-05-09 RX ORDER — CLONAZEPAM 0.5 MG/1
.25-.5 TABLET ORAL NIGHTLY PRN
Qty: 30 TABLET | Refills: 1 | Status: SHIPPED | OUTPATIENT
Start: 2025-05-09 | End: 2026-05-09

## 2025-05-09 RX ORDER — LEVOTHYROXINE SODIUM 50 UG/1
50 TABLET ORAL
Qty: 90 TABLET | Refills: 3 | Status: SHIPPED | OUTPATIENT
Start: 2025-05-09

## 2025-05-09 RX ORDER — BUPROPION HYDROCHLORIDE 300 MG/1
300 TABLET ORAL DAILY
Qty: 90 TABLET | Refills: 1 | Status: SHIPPED | OUTPATIENT
Start: 2025-05-09

## 2025-05-09 RX ADMIN — GADOBENATE DIMEGLUMINE 15 ML: 529 INJECTION, SOLUTION INTRAVENOUS at 12:05

## 2025-05-09 NOTE — PROGRESS NOTES
Subjective:       Patient ID: Paty Parham is a 44 y.o. female.    Chief Complaint: Annual Exam    HPI    Depression /anxiety- on wellbutrin. Prn klonopin Stable. . No HI/SI  Hypothyroid. No symptoms. On synthroid  Meningioma temporal-sees neuro ext. Stable.  Vertigo- on topamax per neuro/stable.      Past Medical History:   Diagnosis Date    Depression     Digestive disorder     Hypertension     Hypothyroidism     Major depressive disorder, recurrent, mild-moderate, with anxiety 8/31/2017    Meniere's disease, left ear     Meningioma, left temporal region 6/24/2020    NEUROSURGEON: Dr. Zhu NEUROLOGIST: Dr. Satya Carias    Moderate mixed hyperlipidemia not requiring statin therapy 6/24/2020    Plantar fascial fibromatosis     PONV (postoperative nausea and vomiting)     Recurrent suppurative otitis media of right ear with spontaneous tympanic membrane rupture     Vertigo      Past Surgical History:   Procedure Laterality Date    BREAST BIOPSY Right 04/2022    CHOLECYSTECTOMY      epidural steroid injection      ESOPHAGOGASTRODUODENOSCOPY N/A 05/03/2021    Procedure: ESOPHAGOGASTRODUODENOSCOPY (EGD);  Surgeon: Mirian Hernandez MD;  Location: [facility];  Service: Endoscopy;  Laterality: N/A;    WISDOM TOOTH EXTRACTION      X 1     Social History[1]  Review of patient's allergies indicates:   Allergen Reactions    Wasp venom Rash     Mental confusion.    Peanut Hives and Itching     Current Outpatient Medications   Medication Sig    meclizine (ANTIVERT) 25 mg tablet Take 1 tablet (25 mg total) by mouth 3 (three) times daily as needed for Dizziness.    mupirocin (BACTROBAN) 2 % ointment Apply topically 2 (two) times daily as needed.    norgestrel-ethinyl estradioL (LO/OVRAL) 0.3-30 mg-mcg per tablet Take 1 tablet by mouth once daily.    topiramate (TOPAMAX) 50 MG tablet Take 50 mg by mouth 2 (two) times daily.    buPROPion (WELLBUTRIN XL) 300 MG 24 hr tablet Take 1 tablet (300 mg total) by mouth once  daily.    clonazePAM (KLONOPIN) 0.5 MG tablet Take 0.5-1 tablets (0.25-0.5 mg total) by mouth nightly as needed (for anxiety or insomnia).    levothyroxine (SYNTHROID) 50 MCG tablet Take 1 tablet (50 mcg total) by mouth before breakfast.     No current facility-administered medications for this visit.           Review of Systems   Constitutional:  Negative for activity change, appetite change, chills, diaphoresis, fatigue, fever and unexpected weight change.   HENT:  Negative for congestion, ear pain, hearing loss, postnasal drip, rhinorrhea, sinus pressure, sinus pain, sneezing, sore throat, tinnitus, trouble swallowing and voice change.    Eyes:  Negative for photophobia, pain, discharge and visual disturbance.   Respiratory:  Negative for cough, chest tightness, shortness of breath and wheezing.    Cardiovascular:  Negative for chest pain, palpitations and leg swelling.   Gastrointestinal:  Negative for abdominal distention, abdominal pain, blood in stool, constipation, diarrhea, nausea and vomiting.   Endocrine: Negative for polydipsia and polyuria.   Genitourinary:  Negative for decreased urine volume, difficulty urinating, dysuria, flank pain, frequency, hematuria, menstrual problem and urgency.   Musculoskeletal:  Negative for arthralgias, back pain, joint swelling, neck pain and neck stiffness.   Allergic/Immunologic: Negative for immunocompromised state.   Neurological:  Negative for dizziness, tremors, seizures, syncope, facial asymmetry, speech difficulty, weakness, light-headedness, numbness and headaches.   Hematological:  Negative for adenopathy. Does not bruise/bleed easily.   Psychiatric/Behavioral:  Negative for confusion, dysphoric mood and sleep disturbance.        Objective:      Physical Exam  Constitutional:       Appearance: She is obese.   HENT:      Head: Normocephalic and atraumatic.      Right Ear: Tympanic membrane normal.      Left Ear: Tympanic membrane normal.   Eyes:       Conjunctiva/sclera: Conjunctivae normal.   Cardiovascular:      Rate and Rhythm: Normal rate and regular rhythm.      Heart sounds: Normal heart sounds.   Pulmonary:      Effort: Pulmonary effort is normal.      Breath sounds: Normal breath sounds.   Abdominal:      General: Bowel sounds are normal.      Palpations: Abdomen is soft.   Musculoskeletal:         General: Normal range of motion.      Cervical back: Normal range of motion and neck supple.   Skin:     General: Skin is warm and dry.   Neurological:      Mental Status: She is alert and oriented to person, place, and time.         Assessment:     Vitals:    05/09/25 1311   BP: 118/72   Pulse: 82   Resp: 16   Temp: 96.8 °F (36 °C)         1. Annual physical exam    2. Moderate mixed hyperlipidemia not requiring statin therapy    3. Major depressive disorder, recurrent, in partial remission    4. Generalized anxiety disorder    5. Hypothyroidism due to Hashimoto's thyroiditis    6. Hypokalemia    7. Class 1 obesity with body mass index (BMI) of 30.0 to 30.9 in adult, unspecified obesity type, unspecified whether serious comorbidity present        Plan:   Annual physical exam  -     CBC Auto Differential; Future; Expected date: 05/09/2025  -     Comprehensive Metabolic Panel; Future; Expected date: 05/09/2025  -     Hemoglobin A1C; Future; Expected date: 05/09/2025  -     TSH; Future; Expected date: 05/09/2025    Moderate mixed hyperlipidemia not requiring statin therapy  -     Lipid Panel; Future; Expected date: 05/09/2025    Major depressive disorder, recurrent, in partial remission  -     buPROPion (WELLBUTRIN XL) 300 MG 24 hr tablet; Take 1 tablet (300 mg total) by mouth once daily.  Dispense: 90 tablet; Refill: 1    Generalized anxiety disorder  -     buPROPion (WELLBUTRIN XL) 300 MG 24 hr tablet; Take 1 tablet (300 mg total) by mouth once daily.  Dispense: 90 tablet; Refill: 1  -     clonazePAM (KLONOPIN) 0.5 MG tablet; Take 0.5-1 tablets (0.25-0.5 mg  total) by mouth nightly as needed (for anxiety or insomnia).  Dispense: 30 tablet; Refill: 1    Hypothyroidism due to Hashimoto's thyroiditis  -     levothyroxine (SYNTHROID) 50 MCG tablet; Take 1 tablet (50 mcg total) by mouth before breakfast.  Dispense: 90 tablet; Refill: 3    Hypokalemia  Comments:  history of. no symptoms.    Class 1 obesity with body mass index (BMI) of 30.0 to 30.9 in adult, unspecified obesity type, unspecified whether serious comorbidity present  Comments:  reports she walks daily. no acute issues.      Labs now   Refill meds  Rtc annually with pcp  Keep specialty care  Healthy diet/regular exercise recommended.       [1]   Social History  Socioeconomic History    Marital status: Single    Number of children: 0    Years of education: Doctoral Degree   Tobacco Use    Smoking status: Never    Smokeless tobacco: Never   Substance and Sexual Activity    Alcohol use: Yes     Comment: <3 drinks per week.      Drug use: No    Sexual activity: Yes     Partners: Male     Birth control/protection: OCP   Social History Narrative    Full time employed, PhD Law Degree; No smokers in household, 1 dog.     Social Drivers of Health     Financial Resource Strain: Low Risk  (5/2/2025)    Overall Financial Resource Strain (CARDIA)     Difficulty of Paying Living Expenses: Not hard at all   Food Insecurity: No Food Insecurity (5/2/2025)    Hunger Vital Sign     Worried About Running Out of Food in the Last Year: Never true     Ran Out of Food in the Last Year: Never true   Transportation Needs: No Transportation Needs (5/2/2025)    PRAPARE - Transportation     Lack of Transportation (Medical): No     Lack of Transportation (Non-Medical): No   Physical Activity: Unknown (5/2/2025)    Exercise Vital Sign     Days of Exercise per Week: 5 days   Stress: Stress Concern Present (5/2/2025)    [employer] [place] [employer] - Occupational Stress Questionnaire     Feeling of Stress : Rather much   Housing  Stability: Low Risk  (5/2/2025)    Housing Stability Vital Sign     Unable to Pay for Housing in the Last Year: No     Number of Times Moved in the Last Year: 0     Homeless in the Last Year: No

## 2025-05-10 ENCOUNTER — LAB VISIT (OUTPATIENT)
Dept: LAB | Facility: HOSPITAL | Age: 45
End: 2025-05-10
Attending: NURSE PRACTITIONER
Payer: OTHER GOVERNMENT

## 2025-05-10 DIAGNOSIS — E78.2 MODERATE MIXED HYPERLIPIDEMIA NOT REQUIRING STATIN THERAPY: Chronic | ICD-10-CM

## 2025-05-10 DIAGNOSIS — Z00.00 ANNUAL PHYSICAL EXAM: ICD-10-CM

## 2025-05-10 LAB
ABSOLUTE EOSINOPHIL (OHS): 0.25 K/UL
ABSOLUTE MONOCYTE (OHS): 0.52 K/UL (ref 0.3–1)
ABSOLUTE NEUTROPHIL COUNT (OHS): 4.78 K/UL (ref 1.8–7.7)
ALBUMIN SERPL BCP-MCNC: 3.4 G/DL (ref 3.5–5.2)
ALP SERPL-CCNC: 66 UNIT/L (ref 40–150)
ALT SERPL W/O P-5'-P-CCNC: 10 UNIT/L (ref 10–44)
ANION GAP (OHS): 10 MMOL/L (ref 8–16)
AST SERPL-CCNC: 16 UNIT/L (ref 11–45)
BASOPHILS # BLD AUTO: 0.03 K/UL
BASOPHILS NFR BLD AUTO: 0.4 %
BILIRUB SERPL-MCNC: 0.3 MG/DL (ref 0.1–1)
BUN SERPL-MCNC: 10 MG/DL (ref 6–20)
CALCIUM SERPL-MCNC: 9.2 MG/DL (ref 8.7–10.5)
CHLORIDE SERPL-SCNC: 111 MMOL/L (ref 95–110)
CHOLEST SERPL-MCNC: 202 MG/DL (ref 120–199)
CHOLEST/HDLC SERPL: 4.2 {RATIO} (ref 2–5)
CO2 SERPL-SCNC: 20 MMOL/L (ref 23–29)
CREAT SERPL-MCNC: 1.1 MG/DL (ref 0.5–1.4)
EAG (OHS): 94 MG/DL (ref 68–131)
ERYTHROCYTE [DISTWIDTH] IN BLOOD BY AUTOMATED COUNT: 13.9 % (ref 11.5–14.5)
GFR SERPLBLD CREATININE-BSD FMLA CKD-EPI: >60 ML/MIN/1.73/M2
GLUCOSE SERPL-MCNC: 78 MG/DL (ref 70–110)
HBA1C MFR BLD: 4.9 % (ref 4–5.6)
HCT VFR BLD AUTO: 46 % (ref 37–48.5)
HDLC SERPL-MCNC: 48 MG/DL (ref 40–75)
HDLC SERPL: 23.8 % (ref 20–50)
HGB BLD-MCNC: 14.7 GM/DL (ref 12–16)
IMM GRANULOCYTES # BLD AUTO: 0.02 K/UL (ref 0–0.04)
IMM GRANULOCYTES NFR BLD AUTO: 0.2 % (ref 0–0.5)
LDLC SERPL CALC-MCNC: 131.2 MG/DL (ref 63–159)
LYMPHOCYTES # BLD AUTO: 2.56 K/UL (ref 1–4.8)
MCH RBC QN AUTO: 30.2 PG (ref 27–31)
MCHC RBC AUTO-ENTMCNC: 32 G/DL (ref 32–36)
MCV RBC AUTO: 95 FL (ref 82–98)
NONHDLC SERPL-MCNC: 154 MG/DL
NUCLEATED RBC (/100WBC) (OHS): 0 /100 WBC
PLATELET # BLD AUTO: 424 K/UL (ref 150–450)
PMV BLD AUTO: 9.9 FL (ref 9.2–12.9)
POTASSIUM SERPL-SCNC: 4.2 MMOL/L (ref 3.5–5.1)
PROT SERPL-MCNC: 7.7 GM/DL (ref 6–8.4)
RBC # BLD AUTO: 4.87 M/UL (ref 4–5.4)
RELATIVE EOSINOPHIL (OHS): 3.1 %
RELATIVE LYMPHOCYTE (OHS): 31.4 % (ref 18–48)
RELATIVE MONOCYTE (OHS): 6.4 % (ref 4–15)
RELATIVE NEUTROPHIL (OHS): 58.5 % (ref 38–73)
SODIUM SERPL-SCNC: 141 MMOL/L (ref 136–145)
TRIGL SERPL-MCNC: 114 MG/DL (ref 30–150)
TSH SERPL-ACNC: 1.86 UIU/ML (ref 0.4–4)
WBC # BLD AUTO: 8.16 K/UL (ref 3.9–12.7)

## 2025-05-10 PROCEDURE — 36415 COLL VENOUS BLD VENIPUNCTURE: CPT

## 2025-05-10 PROCEDURE — 84443 ASSAY THYROID STIM HORMONE: CPT

## 2025-05-10 PROCEDURE — 83036 HEMOGLOBIN GLYCOSYLATED A1C: CPT

## 2025-05-10 PROCEDURE — 85025 COMPLETE CBC W/AUTO DIFF WBC: CPT

## 2025-05-10 PROCEDURE — 82465 ASSAY BLD/SERUM CHOLESTEROL: CPT

## 2025-05-10 PROCEDURE — 80053 COMPREHEN METABOLIC PANEL: CPT

## 2025-05-12 ENCOUNTER — RESULTS FOLLOW-UP (OUTPATIENT)
Dept: SURGERY | Facility: CLINIC | Age: 45
End: 2025-05-12

## 2025-05-13 ENCOUNTER — RESULTS FOLLOW-UP (OUTPATIENT)
Dept: INTERNAL MEDICINE | Facility: CLINIC | Age: 45
End: 2025-05-13

## 2025-05-28 ENCOUNTER — OFFICE VISIT (OUTPATIENT)
Dept: SURGERY | Facility: CLINIC | Age: 45
End: 2025-05-28
Payer: OTHER GOVERNMENT

## 2025-05-28 VITALS
BODY MASS INDEX: 30.38 KG/M2 | SYSTOLIC BLOOD PRESSURE: 132 MMHG | DIASTOLIC BLOOD PRESSURE: 95 MMHG | WEIGHT: 171.5 LBS | HEART RATE: 83 BPM

## 2025-05-28 DIAGNOSIS — Z91.89 AT HIGH RISK FOR BREAST CANCER: Primary | ICD-10-CM

## 2025-05-28 PROCEDURE — 99214 OFFICE O/P EST MOD 30 MIN: CPT | Mod: S$GLB,,,

## 2025-05-28 PROCEDURE — 99999 PR PBB SHADOW E&M-EST. PATIENT-LVL III: CPT | Mod: PBBFAC,,,

## 2025-05-28 NOTE — PROGRESS NOTES
[facility]  History & Physical    Subjective     CC: At high risk for breast cancer    History of Present Illness:  Patient is a 44 y.o. female who presents for continued surveillance in the high risk breast clinic. She denies any new breast complaints today such as breast pain, breast masses, axillary masses, skin changes, nipple discharge, or changes in nipple shape.     Her breast cancer risk factors are as follows:  Menarche: 10  Menopause: N/A   HRT: none  Pregnancies:   Smoking: never  ETOH: no  Previous breast surgery or breast biopsy: right breast  fibroadenoma  Family history: paternal grandmother with breast cancer at age 42 and ovarian cancer at age 89, currently living; maternal grandmother with pancreatic cancer at age 81,  at age 81; paternal aunt with melanoma at age 55, currently living.     Review of patient's allergies indicates:   Allergen Reactions    Wasp venom Rash     Mental confusion.    Peanut Hives and Itching       Current Medications[1]    Past Medical History:   Diagnosis Date    Depression     Digestive disorder     Hypertension     Hypothyroidism     Major depressive disorder, recurrent, mild-moderate, with anxiety 2017    Meniere's disease, left ear     Meningioma, left temporal region 2020    NEUROSURGEON: Dr. Zhu NEUROLOGIST: Dr. Satya Carias    Moderate mixed hyperlipidemia not requiring statin therapy 2020    Plantar fascial fibromatosis     PONV (postoperative nausea and vomiting)     Recurrent suppurative otitis media of right ear with spontaneous tympanic membrane rupture     Vertigo      Past Surgical History:   Procedure Laterality Date    BREAST BIOPSY Right 2022    CHOLECYSTECTOMY      epidural steroid injection      ESOPHAGOGASTRODUODENOSCOPY N/A 2021    Procedure: ESOPHAGOGASTRODUODENOSCOPY (EGD);  Surgeon: Mirian Hernandez MD;  Location: [facility];  Service: Endoscopy;  Laterality: N/A;    WISDOM TOOTH  EXTRACTION      X 1     Family History   Problem Relation Name Age of Onset    Hypothyroidism Mother 58     Hyperparathyroidism Mother 58     Sjogren's syndrome Mother 58     Ankylosing spondylitis Mother 58     Sarcoidosis Mother 58     Breast cancer Paternal Grandmother      Ovarian cancer Paternal Grandmother      ADD / ADHD Brother      Hypertension Brother      Hyperlipidemia Brother       Social History[2]     Review of Systems:  Review of Systems   Constitutional:  Negative for chills, fatigue, fever and unexpected weight change.   Respiratory:  Negative for cough, shortness of breath, wheezing and stridor.    Cardiovascular:  Negative for chest pain, palpitations and leg swelling.   Gastrointestinal:  Negative for abdominal distention, abdominal pain, constipation, diarrhea, nausea and vomiting.   Genitourinary:  Negative for difficulty urinating, dysuria, frequency, hematuria and urgency.        Negative breast ROS as per HPI   Skin:  Negative for color change, pallor, rash and wound.   Hematological:  Does not bruise/bleed easily.          Objective     Vital Signs (Most Recent)  Pulse: 83 (05/28/25 0954)  BP: (!) 132/95 (05/28/25 0954)     77.8 kg (171 lb 8.3 oz)     Physical Exam:  Physical Exam  Vitals reviewed. Exam conducted with a chaperone present.   Constitutional:       General: She is not in acute distress.     Appearance: She is well-developed. She is not toxic-appearing.   HENT:      Head: Normocephalic and atraumatic.      Right Ear: External ear normal.      Left Ear: External ear normal.      Nose: Nose normal.      Mouth/Throat:      Mouth: Mucous membranes are moist.   Eyes:      Extraocular Movements: Extraocular movements intact.      Conjunctiva/sclera: Conjunctivae normal.   Cardiovascular:      Rate and Rhythm: Normal rate.   Pulmonary:      Effort: Pulmonary effort is normal. No respiratory distress.   Chest:   Breasts:     Right: Inverted nipple present. No swelling, bleeding, mass,  nipple discharge, skin change or tenderness.      Left: Inverted nipple present. No swelling, bleeding, mass, nipple discharge, skin change or tenderness.      Comments: Bilateral nipple inversion, left> right. This is the patient's baseline. The nipple is easily everted.   Musculoskeletal:      Cervical back: Neck supple.   Lymphadenopathy:      Head:      Right side of head: No submental, submandibular or occipital adenopathy.      Left side of head: No submental, submandibular or occipital adenopathy.      Cervical: No cervical adenopathy.      Right cervical: No superficial or deep cervical adenopathy.     Left cervical: No superficial or deep cervical adenopathy.      Upper Body:      Right upper body: No supraclavicular or axillary adenopathy.      Left upper body: No supraclavicular or axillary adenopathy.   Skin:     General: Skin is warm and dry.   Neurological:      Mental Status: She is alert and oriented to person, place, and time.   Psychiatric:         Behavior: Behavior normal.       Imaging:  History:  Patient is 44 y.o. and is seen for at high risk for breast cancer.        Films Compared:  Compared to: 04/23/2024 MRI Breast w/wo Contrast, w/CAD, Bilateral and 04/19/2023 MRI Breast w/wo Contrast, w/CAD, Bilateral     Technique:  A routine breast MRI was performed with a dedicated breast coil. Pre-contrast STIR were acquired. Then, pre and post contrast T1 weighted fat saturated images were acquired and subtracted with MIP reconstruction. 15 mL of intravenous gadolinium contrast was administered. The study was reviewed with [employer] software.     Findings:  The breasts have heterogeneous fibroglandular tissue. The background parenchymal enhancement is mild and symmetric.     There is no evidence of suspicious masses, abnormal enhancement, or other abnormal findings.     Impression:  There is no MR evidence of malignancy.        BI-RADS Category:   Overall: 1 - Negative     Recommendation:  Screening  Breast MRI in 1 year is recommended for both breasts.     Your estimated lifetime risk of breast cancer (to age 85) based on Tyrer-Cuzick risk assessment model is Tyrer-Cuzick: 23.43%. According to the [employer] Cancer Society, patients with a lifetime breast cancer risk of 20% or higher might benefit from supplemental screening tests.       Assessment and Plan   Paty is a 44 y.o. female who presents for follow-up in the high risk program.  She was identified for the program due to having a elevated score by a risk assessment model and family history of breast or ovarian cancer.  Her lifetime risk for the development of breast cancer by the Tyrer Cuzick v8 model is 23.43%. Therefore, she meets criteria to be followed and screened as a high risk patient.       We reviewed the NCCN guidelines for high risk women to be the following:   Twice annual clinical breast exam  Screening mammogram beginning 10 years earlier than the youngest affected family member  Consideration of supplemental annual imaging alternating with her mammogram on the 6 month interval. We discussed that the guidelines currently include breast MRI as the supplemental imaging option.   Adopting risk-reduction strategies       Her individualized plan is the following:  She will see a provider in the breast clinic twice annual for clinical breast examination.   She will continue annual screening mammography  She will continue annual bilateral MRI as a method of increased screening.   Regarding risk reduction, she is recommended to maintain a healthy weight (BMI 18-25), regular aerobic exercise (at least 150 minutes/week), balanced healthy diet (high in vegetables, fruits, and whole grains) and avoidance of red meats, processed foods, & refined sugars. , and limit alcohol consumption .        Paty has a normal breast exam today. We discussed the possible signs and symptoms of breast cancer as lump, masses, new asymmetries, skin changes and nipple changes.  She is encouraged to contact me if any new breast concerns arise.      Genevieve Cheng PA-C  [facility]        I spent a total of 30 minutes on the day of the visit.This includes face to face time and non-face to face time preparing to see the patient (eg, review of tests), obtaining and/or reviewing separately obtained history, documenting clinical information in the electronic or other health record, independently interpreting results and communicating results to the patient/family/caregiver, or care coordinator.               [1]   Current Outpatient Medications   Medication Sig Dispense Refill    buPROPion (WELLBUTRIN XL) 300 MG 24 hr tablet Take 1 tablet (300 mg total) by mouth once daily. 90 tablet 1    clonazePAM (KLONOPIN) 0.5 MG tablet Take 0.5-1 tablets (0.25-0.5 mg total) by mouth nightly as needed (for anxiety or insomnia). 30 tablet 1    levothyroxine (SYNTHROID) 50 MCG tablet Take 1 tablet (50 mcg total) by mouth before breakfast. 90 tablet 3    meclizine (ANTIVERT) 25 mg tablet Take 1 tablet (25 mg total) by mouth 3 (three) times daily as needed for Dizziness. 90 tablet 3    mupirocin (BACTROBAN) 2 % ointment Apply topically 2 (two) times daily as needed. 30 g 0    norgestrel-ethinyl estradioL (LO/OVRAL) 0.3-30 mg-mcg per tablet Take 1 tablet by mouth once daily.      topiramate (TOPAMAX) 50 MG tablet Take 50 mg by mouth 2 (two) times daily.       No current facility-administered medications for this visit.   [2]   Social History  Tobacco Use    Smoking status: Never    Smokeless tobacco: Never   Substance Use Topics    Alcohol use: Yes     Comment: <3 drinks per week.      Drug use: No

## (undated) DEVICE — DRAPE ABDOMINAL TIBURON 14X11

## (undated) DEVICE — SYR 3CC LUER LOC

## (undated) DEVICE — SEE MEDLINE ITEM 157117

## (undated) DEVICE — NDL PNEUMO INSUFFLATI 120MM

## (undated) DEVICE — GLOVE PROTEXIS HYDROGEL SZ7

## (undated) DEVICE — SOL NS 1000CC

## (undated) DEVICE — CLIP HEMO-LOK MLX LARGE LF

## (undated) DEVICE — LINER GLOVE POWDERFREE SZ 7

## (undated) DEVICE — COVER OVERHEAD SURG LT BLUE

## (undated) DEVICE — CLOSURE SKIN STERI STRIP 1/2X4

## (undated) DEVICE — SEE MEDLINE ITEM 157131

## (undated) DEVICE — SEE MEDLINE ITEM 152622

## (undated) DEVICE — MANIFOLD 4 PORT

## (undated) DEVICE — SUT VICRYL PLUS 4-0 P3 18IN

## (undated) DEVICE — SYR 30CC LUER LOCK

## (undated) DEVICE — COVER TIP CURVED SCISSORS XI

## (undated) DEVICE — ADHESIVE MASTISOL VIAL 48/BX

## (undated) DEVICE — SUPPORT ULNA NERVE PROTECTOR

## (undated) DEVICE — DRAPE ARM DAVINCI XI

## (undated) DEVICE — ELECTRODE REM PLYHSV RETURN 9

## (undated) DEVICE — EVACUATOR KIT SMOKE PLUME AWAY

## (undated) DEVICE — BAG TISSUE RETRIEVAL 5MM

## (undated) DEVICE — DECANTER VIAL ASEPTIC TRANSFER

## (undated) DEVICE — KIT ANTIFOG

## (undated) DEVICE — SEAL UNIVERSAL 5MM-8MM XI

## (undated) DEVICE — POSITIONER HEAD DONUT 9IN FOAM

## (undated) DEVICE — NDL HYPO SAFETY 22 X 1 1/2

## (undated) DEVICE — OBTURATOR BLADELESS 8MM XI CLR

## (undated) DEVICE — TUBING HEATED INSUFFLATOR

## (undated) DEVICE — SEE MEDLINE ITEM 157027

## (undated) DEVICE — DRAPE COLUMN DAVINCI XI

## (undated) DEVICE — CLIP HEMO-LOK ML

## (undated) DEVICE — Device

## (undated) DEVICE — APPLICATOR CHLORAPREP ORN 26ML

## (undated) DEVICE — GLOVE 7.0 PROTEXIS PI BLUE

## (undated) DEVICE — SEE MEDLINE ITEM 152680

## (undated) DEVICE — NDL INSUF ULTRA VERESS 120MM